# Patient Record
Sex: MALE | Race: WHITE | NOT HISPANIC OR LATINO | Employment: OTHER | URBAN - METROPOLITAN AREA
[De-identification: names, ages, dates, MRNs, and addresses within clinical notes are randomized per-mention and may not be internally consistent; named-entity substitution may affect disease eponyms.]

---

## 2017-01-05 ENCOUNTER — PATIENT MESSAGE (OUTPATIENT)
Dept: PAIN MEDICINE | Facility: CLINIC | Age: 57
End: 2017-01-05

## 2017-02-06 ENCOUNTER — PATIENT MESSAGE (OUTPATIENT)
Dept: PAIN MEDICINE | Facility: CLINIC | Age: 57
End: 2017-02-06

## 2017-02-07 ENCOUNTER — OFFICE VISIT (OUTPATIENT)
Dept: PAIN MEDICINE | Facility: CLINIC | Age: 57
End: 2017-02-07
Payer: OTHER GOVERNMENT

## 2017-02-07 VITALS
BODY MASS INDEX: 32.35 KG/M2 | HEART RATE: 76 BPM | WEIGHT: 252 LBS | RESPIRATION RATE: 20 BRPM | DIASTOLIC BLOOD PRESSURE: 74 MMHG | TEMPERATURE: 98 F | SYSTOLIC BLOOD PRESSURE: 128 MMHG

## 2017-02-07 DIAGNOSIS — G50.0 TRIGEMINAL NEURALGIA: ICD-10-CM

## 2017-02-07 DIAGNOSIS — G50.0 SUPRAORBITAL NEURALGIA: Primary | ICD-10-CM

## 2017-02-07 DIAGNOSIS — B02.29 POSTHERPETIC NEURALGIA: ICD-10-CM

## 2017-02-07 PROCEDURE — 99213 OFFICE O/P EST LOW 20 MIN: CPT | Mod: S$PBB,,, | Performed by: ANESTHESIOLOGY

## 2017-02-07 PROCEDURE — 99999 PR PBB SHADOW E&M-EST. PATIENT-LVL III: CPT | Mod: PBBFAC,,, | Performed by: ANESTHESIOLOGY

## 2017-02-07 PROCEDURE — 99213 OFFICE O/P EST LOW 20 MIN: CPT | Mod: PBBFAC,PO | Performed by: ANESTHESIOLOGY

## 2017-02-07 RX ORDER — SODIUM CHLORIDE, SODIUM LACTATE, POTASSIUM CHLORIDE, CALCIUM CHLORIDE 600; 310; 30; 20 MG/100ML; MG/100ML; MG/100ML; MG/100ML
INJECTION, SOLUTION INTRAVENOUS CONTINUOUS
Status: CANCELLED | OUTPATIENT
Start: 2017-02-24

## 2017-02-07 RX ORDER — MIDAZOLAM HYDROCHLORIDE 5 MG/ML
4 INJECTION INTRAMUSCULAR; INTRAVENOUS ONCE
Status: CANCELLED | OUTPATIENT
Start: 2017-02-24

## 2017-02-07 NOTE — PROGRESS NOTES
CC: Facial pain    Interval history: Mr. Muñoz is a 56-year-old male with left-sided facial pain that began after shingles in March 2015.  He returns in follow-up today with the same left facial pain.  During the last visit I had performed a left supraorbital block with bupivacaine and steroid.  He did report greater than 75% relief for 1 day which seems to be the duration of the bupivacaine but did not have any sustained effect afterwards.  He still would like to try anything to get rid of this, unfortunately no medications have actually been very helpful.  He has 2 times a day when it seems to affect him most, once in the morning as he wakes up and is taking a shower and then at the end of the day when he is trying to fall sleep.  He now has the pain localized in a strip of skin that is about 2 inches wide and starts at the left supraorbital ridge and extends cephalad to the top of his head.  It does not radiate out to the side, but has been radiating into the eye somewhat as well.      Pain intervention history: He has tried Lyrica and gabapentin both with side effects of drowsiness but no relief. He has failed Cymbalta, Gabapentin, and Lyrica, and Topamax with minimal benefit and SEs. He is s/p left trigeminal nerve block on 5/9/16 that provided no relief. Initially following block he actually had an exacerbation.    ROS: He reports fatigability, waking, itching, texture change of the skin, headaches, vision change, eye pain, rhinorrhea, cough, stomach pain, joint stiffness and difficulty sleeping.  Balance of review of systems is negative.    Medical, surgical, family and social history reviewed elsewhere in record.    Medications/Allergies: See med card    Vitals:    02/07/17 0933   BP: 128/74   Pulse: 76   Resp: 20   Temp: 98.3 °F (36.8 °C)   TempSrc: Oral   Weight: 114.3 kg (251 lb 15.8 oz)   PainSc:   4   PainLoc: Face         Physical exam:  Gen: A and O x3, pleasant, well-groomed  Skin: No rashes or  obvious lesions, specifically no lesions in the V1 distribution of his forehead or eye.  HEENT: PERRLA, no obvious deformities on ears or in canals.Trachea midline.  CVS: Regular rate and rhythm, normal palpable pulses.  Resp: Clear to auscultation bilaterally, no wheezes or rales.  Abdomen: Soft, NT/ND.  Musculoskeletal: A No antalgic gait.     Neuro:  Upper extremities: 5/5 strength bilaterally   Reflexes: Brachioradialis 2+, Bicep 2+, Tricep 2+.   Sensory: Intact and symmetrical to light touch and pinprick in C2-T1 dermatomes bilaterally.  Cranial nerves II through XII grossly intact.  He has some allodynia over the left V1 distribution.  He has sensitivity in the distribution from the supraorbital ridge on the left side upward, one small spot of extreme hypersensitivity about 2 inches cephalad to the supraorbital ridge.      Assessment:  Mr. Muñoz is a 56-year-old male with left-sided facial pain that began after shingles in March 2015.    1. Supraorbital neuralgia  Vital signs    Activity as tolerated    Place 18-22 [facility] peripheral IV     Verify informed consent    Notify physician     Notify physician     Notify physician (specify)    Diet NPO    Case Request Operating Room: RADIOFREQUENCY THERMOCOAGULATION supraorbital nerve    Place in Outpatient    lactated ringers infusion    midazolam (PF) 5 mg/mL injection 4 mg   2. Trigeminal neuralgia     3. Postherpetic neuralgia         Plan:  1.  We discussed that since he had excellent relief from the supraorbital nerve block on the left side during the duration of bupivacaine, I feel that radiofrequency ablation may provide some sustained relief.  We discussed risks and benefits and he would like to proceed.  2.  We discussed that if he is not getting benefit with the radiofrequency ablation, he may be evaluated by neurosurgery for possible neurectomy and we also discussed peripheral nerve stimulation trial.  3.  He asked about what sounds like a TENS unit  for the face, he had read about it somewhere.  I have told him to look at the information and send me a copy of this so that I can review it for him.  4.  I'll have him follow-up in 3-4 weeks after the radiofrequency ablation or sooner as needed.

## 2017-02-21 ENCOUNTER — PATIENT MESSAGE (OUTPATIENT)
Dept: PAIN MEDICINE | Facility: CLINIC | Age: 57
End: 2017-02-21

## 2017-02-24 ENCOUNTER — HOSPITAL ENCOUNTER (OUTPATIENT)
Dept: RADIOLOGY | Facility: HOSPITAL | Age: 57
Discharge: HOME | End: 2017-02-24
Attending: ANESTHESIOLOGY | Admitting: ANESTHESIOLOGY
Payer: OTHER GOVERNMENT

## 2017-02-24 ENCOUNTER — HOSPITAL ENCOUNTER (OUTPATIENT)
Facility: HOSPITAL | Age: 57
Discharge: HOME | End: 2017-02-24
Attending: ANESTHESIOLOGY | Admitting: ANESTHESIOLOGY
Payer: OTHER GOVERNMENT

## 2017-02-24 ENCOUNTER — SURGERY (OUTPATIENT)
Age: 57
End: 2017-02-24

## 2017-02-24 DIAGNOSIS — M51.36 DDD (DEGENERATIVE DISC DISEASE), LUMBAR: ICD-10-CM

## 2017-02-24 DIAGNOSIS — G50.0 SUPRAORBITAL NEURALGIA: ICD-10-CM

## 2017-02-24 PROCEDURE — 63600175 PHARM REV CODE 636 W HCPCS: Mod: PO | Performed by: ANESTHESIOLOGY

## 2017-02-24 PROCEDURE — 64640 INJECTION TREATMENT OF NERVE: CPT | Mod: PO | Performed by: ANESTHESIOLOGY

## 2017-02-24 PROCEDURE — 64640 INJECTION TREATMENT OF NERVE: CPT | Mod: LT,,, | Performed by: ANESTHESIOLOGY

## 2017-02-24 PROCEDURE — 64633 DESTROY CERV/THOR FACET JNT: CPT | Performed by: ANESTHESIOLOGY

## 2017-02-24 PROCEDURE — 25000003 PHARM REV CODE 250: Mod: PO | Performed by: ANESTHESIOLOGY

## 2017-02-24 PROCEDURE — 99152 MOD SED SAME PHYS/QHP 5/>YRS: CPT | Mod: ,,, | Performed by: ANESTHESIOLOGY

## 2017-02-24 RX ORDER — FENTANYL CITRATE 50 UG/ML
INJECTION, SOLUTION INTRAMUSCULAR; INTRAVENOUS
Status: DISCONTINUED | OUTPATIENT
Start: 2017-02-24 | End: 2017-02-24 | Stop reason: HOSPADM

## 2017-02-24 RX ORDER — SODIUM CHLORIDE, SODIUM LACTATE, POTASSIUM CHLORIDE, CALCIUM CHLORIDE 600; 310; 30; 20 MG/100ML; MG/100ML; MG/100ML; MG/100ML
INJECTION, SOLUTION INTRAVENOUS CONTINUOUS
Status: DISCONTINUED | OUTPATIENT
Start: 2017-02-24 | End: 2017-02-24 | Stop reason: HOSPADM

## 2017-02-24 RX ORDER — MIDAZOLAM HYDROCHLORIDE 1 MG/ML
4 INJECTION INTRAMUSCULAR; INTRAVENOUS ONCE
Status: COMPLETED | OUTPATIENT
Start: 2017-02-24 | End: 2017-02-24

## 2017-02-24 RX ORDER — LIDOCAINE HYDROCHLORIDE 20 MG/ML
INJECTION, SOLUTION EPIDURAL; INFILTRATION; INTRACAUDAL; PERINEURAL
Status: DISCONTINUED | OUTPATIENT
Start: 2017-02-24 | End: 2017-02-24 | Stop reason: HOSPADM

## 2017-02-24 RX ORDER — LIDOCAINE HYDROCHLORIDE 10 MG/ML
INJECTION INFILTRATION; PERINEURAL
Status: DISCONTINUED | OUTPATIENT
Start: 2017-02-24 | End: 2017-02-24 | Stop reason: HOSPADM

## 2017-02-24 RX ORDER — SODIUM CHLORIDE 9 MG/ML
INJECTION, SOLUTION INTRAMUSCULAR; INTRAVENOUS; SUBCUTANEOUS
Status: DISCONTINUED | OUTPATIENT
Start: 2017-02-24 | End: 2017-02-24 | Stop reason: HOSPADM

## 2017-02-24 RX ORDER — METHYLPREDNISOLONE ACETATE 40 MG/ML
INJECTION, SUSPENSION INTRA-ARTICULAR; INTRALESIONAL; INTRAMUSCULAR; SOFT TISSUE
Status: DISCONTINUED | OUTPATIENT
Start: 2017-02-24 | End: 2017-02-24 | Stop reason: HOSPADM

## 2017-02-24 RX ADMIN — SODIUM CHLORIDE, SODIUM LACTATE, POTASSIUM CHLORIDE, AND CALCIUM CHLORIDE: .6; .31; .03; .02 INJECTION, SOLUTION INTRAVENOUS at 12:02

## 2017-02-24 RX ADMIN — LIDOCAINE HYDROCHLORIDE 10 ML: 10 INJECTION, SOLUTION INFILTRATION; PERINEURAL at 01:02

## 2017-02-24 RX ADMIN — MIDAZOLAM HYDROCHLORIDE 4 MG: 1 INJECTION, SOLUTION INTRAMUSCULAR; INTRAVENOUS at 01:02

## 2017-02-24 RX ADMIN — METHYLPREDNISOLONE ACETATE 40 MG: 40 INJECTION, SUSPENSION INTRA-ARTICULAR; INTRALESIONAL; INTRAMUSCULAR; SOFT TISSUE at 01:02

## 2017-02-24 RX ADMIN — SODIUM CHLORIDE 4 ML: 9 INJECTION INTRAMUSCULAR; INTRAVENOUS; SUBCUTANEOUS at 01:02

## 2017-02-24 RX ADMIN — FENTANYL CITRATE 50 MCG: 50 INJECTION, SOLUTION INTRAMUSCULAR; INTRAVENOUS at 01:02

## 2017-02-24 RX ADMIN — LIDOCAINE HYDROCHLORIDE 4 ML: 20 INJECTION, SOLUTION EPIDURAL; INFILTRATION; INTRACAUDAL; PERINEURAL at 01:02

## 2017-02-24 NOTE — INTERVAL H&P NOTE
The patient has been examined and the H&P has been reviewed:    I concur with the findings and no changes have occurred since H&P was written.    Anesthesia/Surgery risks, benefits and alternative options discussed and understood by patient/family.          Active Hospital Problems    Diagnosis  POA    Supraorbital neuralgia [G52.9]  Yes      Resolved Hospital Problems    Diagnosis Date Resolved POA   No resolved problems to display.

## 2017-02-24 NOTE — IP AVS SNAPSHOT
[facility]  [address]  [place] [address]  Phone: [phone]           Patient Discharge Instructions     Our goal is to set you up for success. This packet includes information on your condition, medications, and your home care. It will help you to care for yourself so you don't get sicker and need to go back to the hospital.     Please ask your nurse if you have any questions.        There are many details to remember when preparing to leave the hospital. Here is what you will need to do:    1. Take your medicine. If you are prescribed medications, review your Medication List in the following pages. You may have new medications to  at the pharmacy and others that you'll need to stop taking. Review the instructions for how and when to take your medications. Talk with your doctor or nurses if you are unsure of what to do.     2. Go to your follow-up appointments. Specific follow-up information is listed in the following pages. Your may be contacted by a transition nurse or clinical provider about future appointments. Be sure we have all of the phone numbers to reach you, if needed. Please contact your provider's office if you are unable to make an appointment.     3. Watch for warning signs. Your doctor or nurse will give you detailed warning signs to watch for and when to call for assistance. These instructions may also include educational information about your condition. If you experience any of warning signs to your health, call your doctor.               [facility]  Unless otherwise directed by your provider, please contact [facility], our nurse care line that is available for 24/7 assistance.     [phone] (toll-free)    Registered nurses in the [facility] provide clinical advisement, health education, appointment booking, and other advisory services.                    ** Verify the list of medication(s) below is accurate and up  to date. Carry this with you in case of emergency. If your medications have changed, please notify your healthcare provider.             Medication List      CONTINUE taking these medications        Additional Info                      hydrOXYzine HCl 25 MG tablet   Commonly known as:  ATARAX   Quantity:  30 tablet   Refills:  1   Dose:  25 mg    Instructions:  Take 1 tablet (25 mg total) by mouth 2 (two) times daily as needed for Itching.     Begin Date    AM    Noon    PM    Bedtime       loratadine 10 mg tablet   Commonly known as:  CLARITIN   Quantity:  30 tablet   Refills:  3   Dose:  10 mg    Instructions:  Take 1 tablet (10 mg total) by mouth once daily.     Begin Date    AM    Noon    PM    Bedtime       pantoprazole 40 MG tablet   Commonly known as:  PROTONIX   Quantity:  30 tablet   Refills:  3   Dose:  40 mg    Instructions:  Take 1 tablet (40 mg total) by mouth once daily.     Begin Date    AM    Noon    PM    Bedtime       prednisoLONE acetate 1 % Drps   Commonly known as:  PRED FORTE   Refills:  0    Instructions:  INSTILL ONE DROP INTO LEFT EYE 3 TIMES DAILY     Begin Date    AM    Noon    PM    Bedtime       simvastatin 20 MG tablet   Commonly known as:  ZOCOR   Quantity:  30 tablet   Refills:  5   Dose:  20 mg    Instructions:  Take 1 tablet (20 mg total) by mouth every evening.     Begin Date    AM    Noon    PM    Bedtime                  Please bring to all follow up appointments:    1. A copy of your discharge instructions.  2. All medicines you are currently taking in their original bottles.  3. Identification and insurance card.    Please arrive 15 minutes ahead of scheduled appointment time.    Please call 24 hours in advance if you must reschedule your appointment and/or time.        Your Scheduled Appointments     Mar 14, 2017  9:30 AM CDT   Established Patient Visit with MD [facility]    [address] [facility] [place] [address]    [phone]                Discharge Instructions     Future Orders    Activity as tolerated     Call MD for:  redness, tenderness, or signs of infection (pain, swelling, redness, odor or green/yellow discharge around incision site)     Call MD for:  severe persistent headache     Call MD for:  severe uncontrolled pain     Call MD for:  temperature >100.4     Diet general     Questions:    Total calories:      Fat restriction, if any:      Protein restriction, if any:      Na restriction, if any:      Fluid restriction:      Additional restrictions:      No dressing needed         Discharge Instructions       Home care instructions  Apply ice pack to the injection site for 20 minutes periods for the first 24 hrs for soreness/discomfort at injection site DO NOT USE HEAT FOR 24 HOURS  Keep site clean and dry for 24 hours, remove bandaid when desired  Do not drive until tomorrow  Take care when walking after a lumbar injection  Avoid strenuous activities for 2 days  Make take 2 weeks to feel the full effects   Resume home medication as prescribed today  Resume Aspirin, Plavix, or Coumadin the day after the procedure unless otherwise instructed.    SEE IMMEDIATE MEDICAL HELP FOR:  Severe increase in your usual pain or appearance of new pain  Prolonged or increasing weakness or numbness in the legs or arms  Drainage, redness, active bleeding, or increased swelling at the injection site  Temperature over 100.0 degrees F.  Headache that increases when your head is upright and decreases when you lie flat    CALL 911 OR GO DIRECTLY TO EMERGENCY DEPARTMENT FOR:  Shortness of breath, chest pain, or problems breathing      Primary Diagnosis     Your primary diagnosis was:  Cranial Nerve Disorder      Admission Information     Date & Time Provider Department [facility]    2/24/2017 11:36 AM Chun Crain MD [facility] [phone]      Care Providers     Provider Role Specialty Primary office phone    Chun MULLINS  MD [facility] Attending Provider Pain Medicine [phone]    Chun Crain MD Surgeon  Pain Medicine [phone]      Your Vitals Were     BP                   112/68 (BP Location: Left arm, Patient Position: Sitting, BP Method: Automatic)           Recent Lab Values     No lab values to display.      Allergies as of 2/24/2017        Reactions    No Known Allergies       Advance Directives     An advance directive is a document which, in the event you are no longer able to make decisions for yourself, tells your healthcare team what kind of treatment you do or do not want to receive, or who you would like to make those decisions for you.  If you do not currently have an advance directive, [facility] encourages you to create one.  For more information call:  [phone]), [phone] ([phone]),  or log on to [email or web address].        Language Assistance Services     ATTENTION: Language assistance services are available, free of charge. Please call [phone].      ATENCIÓN: Si habla español, tiene a plaza disposición servicios gratuitos de asistencia lingüística. Llame al [phone].     CHÚ Ý: N?u b?n nói Ti?ng Vi?t, có các d?ch v? h? tr? ngôn ng? mi?n phí dành cho b?n. G?i s? [phone].         [facility] complies with applicable Federal civil rights laws and does not discriminate on the basis of race, color, national origin, age, disability, or sex.

## 2017-02-24 NOTE — OP NOTE
PROCEDURE DATE: 2/24/2017    PROCEDURE:  Radiofrequency ablation of the left supraorbital nerve medial branch nerves on the left-side utilizing fluoroscopy    DIAGNOSIS:  Supraorbital neuralgia    Post op Diagnosis: Same    PHYSICIAN: Chun Crain MD    MEDICATIONS INJECTED:  From a mixture of 4ml of 2% lidocaine and 40mg of methylprednisone, 0.5ml of this solution was injected at the site.    LOCAL ANESTHETIC USED: Lidocaine 1%, 1 ml given at each site.    SEDATION MEDICATIONS: 4mg versed, 50mcg fentanyl    ESTIMATED BLOOD LOSS:  None    COMPLICATIONS:  none    TECHNIQUE:  A time out was taken to identify patient and procedure side prior to starting the procedure. Laying in a supine position, the patient was prepped and draped in the usual sterile fashion using ChloraPrep and sterile towels.  The left supraorbital foramen was visualized under fluoroscopic guidance and then marked.  Local anesthetic was given by raising a wheal at the skin over the site.  A 20-gauge  50 mm CoursePeer RF needle was introduced to the anatomic location of the left supraorbital nerve over the left supraorbital foramen.  Motor stimulation up to 2 Volts at each level confirmed no motor nerve involvement.  Impedance was less than 800 ohms at each level. The above noted medication was then injected slowly.  Ablation was performed per level utilizing [place] radiofrequency generator 42°C for 240 seconds. The patient tolerated the procedure well.     The patient was monitored after the procedure.  Patient was given post procedure and discharge instructions to follow at home.  The patient was discharged in a stable condition

## 2017-02-24 NOTE — DISCHARGE SUMMARY
[facility]  Discharge Note  Short Stay    Admit Date: 2/24/2017    Discharge Date: 2/24/2017    Attending Physician: Chun Crain MD     Discharge Provider: Chun Crain    Diagnoses:  Active Hospital Problems    Diagnosis  POA    *Supraorbital neuralgia [G52.9]  Yes      Resolved Hospital Problems    Diagnosis Date Resolved POA   No resolved problems to display.       Discharged Condition: good    Final Diagnoses: Supraorbital neuralgia [G52.9]    Disposition: Home or Self Care    Hospital Course: no complications, uneventful    Outcome of Hospitalization, Treatment, Procedure, or Surgery:  Patient was admitted for outpatient procedure. The patient underwent procedure without complications and are discharged home    Follow up/Patient Instructions:  Follow up as scheduled/Patient has received instructions and follow up date    Medications:  Continue previous medications      Discharge Procedure Orders  Diet general     Activity as tolerated     Call MD for:  temperature >100.4     Call MD for:  severe uncontrolled pain     Call MD for:  redness, tenderness, or signs of infection (pain, swelling, redness, odor or green/yellow discharge around incision site)     Call MD for:  severe persistent headache     No dressing needed           Discharge Procedure Orders (must include Diet, Follow-up, Activity):    Discharge Procedure Orders (must include Diet, Follow-up, Activity)  Diet general     Activity as tolerated     Call MD for:  temperature >100.4     Call MD for:  severe uncontrolled pain     Call MD for:  redness, tenderness, or signs of infection (pain, swelling, redness, odor or green/yellow discharge around incision site)     Call MD for:  severe persistent headache     No dressing needed

## 2017-02-24 NOTE — H&P (VIEW-ONLY)
CC: Facial pain    Interval history: Mr. Muñoz is a 56-year-old male with left-sided facial pain that began after shingles in March 2015.  He returns in follow-up today with the same left facial pain.  During the last visit I had performed a left supraorbital block with bupivacaine and steroid.  He did report greater than 75% relief for 1 day which seems to be the duration of the bupivacaine but did not have any sustained effect afterwards.  He still would like to try anything to get rid of this, unfortunately no medications have actually been very helpful.  He has 2 times a day when it seems to affect him most, once in the morning as he wakes up and is taking a shower and then at the end of the day when he is trying to fall sleep.  He now has the pain localized in a strip of skin that is about 2 inches wide and starts at the left supraorbital ridge and extends cephalad to the top of his head.  It does not radiate out to the side, but has been radiating into the eye somewhat as well.      Pain intervention history: He has tried Lyrica and gabapentin both with side effects of drowsiness but no relief. He has failed Cymbalta, Gabapentin, and Lyrica, and Topamax with minimal benefit and SEs. He is s/p left trigeminal nerve block on 5/9/16 that provided no relief. Initially following block he actually had an exacerbation.    ROS: He reports fatigability, waking, itching, texture change of the skin, headaches, vision change, eye pain, rhinorrhea, cough, stomach pain, joint stiffness and difficulty sleeping.  Balance of review of systems is negative.    Medical, surgical, family and social history reviewed elsewhere in record.    Medications/Allergies: See med card    Vitals:    02/07/17 0933   BP: 128/74   Pulse: 76   Resp: 20   Temp: 98.3 °F (36.8 °C)   TempSrc: Oral   Weight: 114.3 kg (251 lb 15.8 oz)   PainSc:   4   PainLoc: Face         Physical exam:  Gen: A and O x3, pleasant, well-groomed  Skin: No rashes or  obvious lesions, specifically no lesions in the V1 distribution of his forehead or eye.  HEENT: PERRLA, no obvious deformities on ears or in canals.Trachea midline.  CVS: Regular rate and rhythm, normal palpable pulses.  Resp: Clear to auscultation bilaterally, no wheezes or rales.  Abdomen: Soft, NT/ND.  Musculoskeletal: A No antalgic gait.     Neuro:  Upper extremities: 5/5 strength bilaterally   Reflexes: Brachioradialis 2+, Bicep 2+, Tricep 2+.   Sensory: Intact and symmetrical to light touch and pinprick in C2-T1 dermatomes bilaterally.  Cranial nerves II through XII grossly intact.  He has some allodynia over the left V1 distribution.  He has sensitivity in the distribution from the supraorbital ridge on the left side upward, one small spot of extreme hypersensitivity about 2 inches cephalad to the supraorbital ridge.      Assessment:  Mr. Muñoz is a 56-year-old male with left-sided facial pain that began after shingles in March 2015.    1. Supraorbital neuralgia  Vital signs    Activity as tolerated    Place 18-22 [facility] peripheral IV     Verify informed consent    Notify physician     Notify physician     Notify physician (specify)    Diet NPO    Case Request Operating Room: RADIOFREQUENCY THERMOCOAGULATION supraorbital nerve    Place in Outpatient    lactated ringers infusion    midazolam (PF) 5 mg/mL injection 4 mg   2. Trigeminal neuralgia     3. Postherpetic neuralgia         Plan:  1.  We discussed that since he had excellent relief from the supraorbital nerve block on the left side during the duration of bupivacaine, I feel that radiofrequency ablation may provide some sustained relief.  We discussed risks and benefits and he would like to proceed.  2.  We discussed that if he is not getting benefit with the radiofrequency ablation, he may be evaluated by neurosurgery for possible neurectomy and we also discussed peripheral nerve stimulation trial.  3.  He asked about what sounds like a TENS unit  for the face, he had read about it somewhere.  I have told him to look at the information and send me a copy of this so that I can review it for him.  4.  I'll have him follow-up in 3-4 weeks after the radiofrequency ablation or sooner as needed.

## 2017-03-01 VITALS
BODY MASS INDEX: 32.21 KG/M2 | HEART RATE: 70 BPM | WEIGHT: 251 LBS | DIASTOLIC BLOOD PRESSURE: 66 MMHG | TEMPERATURE: 98 F | HEIGHT: 74 IN | RESPIRATION RATE: 18 BRPM | SYSTOLIC BLOOD PRESSURE: 117 MMHG | OXYGEN SATURATION: 98 %

## 2017-04-19 ENCOUNTER — OFFICE VISIT (OUTPATIENT)
Dept: FAMILY MEDICINE | Facility: CLINIC | Age: 57
End: 2017-04-19
Payer: OTHER GOVERNMENT

## 2017-04-19 VITALS
HEART RATE: 88 BPM | TEMPERATURE: 98 F | SYSTOLIC BLOOD PRESSURE: 110 MMHG | BODY MASS INDEX: 31.46 KG/M2 | WEIGHT: 245.13 LBS | HEIGHT: 74 IN | DIASTOLIC BLOOD PRESSURE: 70 MMHG

## 2017-04-19 DIAGNOSIS — M54.50 ACUTE BILATERAL LOW BACK PAIN WITHOUT SCIATICA: Primary | ICD-10-CM

## 2017-04-19 DIAGNOSIS — T14.8XXA MUSCLE STRAIN: ICD-10-CM

## 2017-04-19 PROCEDURE — 99999 PR PBB SHADOW E&M-EST. PATIENT-LVL III: CPT | Mod: PBBFAC,,, | Performed by: NURSE PRACTITIONER

## 2017-04-19 PROCEDURE — 99213 OFFICE O/P EST LOW 20 MIN: CPT | Mod: S$PBB,,, | Performed by: NURSE PRACTITIONER

## 2017-04-19 PROCEDURE — 99213 OFFICE O/P EST LOW 20 MIN: CPT | Mod: PBBFAC,PO | Performed by: NURSE PRACTITIONER

## 2017-04-19 RX ORDER — IBUPROFEN 200 MG
200 TABLET ORAL EVERY 6 HOURS PRN
COMMUNITY
End: 2018-06-15

## 2017-04-19 RX ORDER — CYCLOBENZAPRINE HCL 10 MG
10 TABLET ORAL 2 TIMES DAILY PRN
Qty: 60 TABLET | Refills: 0 | Status: SHIPPED | OUTPATIENT
Start: 2017-04-19 | End: 2017-04-29

## 2017-04-19 RX ORDER — NAPROXEN 500 MG/1
500 TABLET ORAL 2 TIMES DAILY WITH MEALS
Qty: 60 TABLET | Refills: 0 | Status: SHIPPED | OUTPATIENT
Start: 2017-04-19 | End: 2017-08-25

## 2017-04-19 NOTE — MR AVS SNAPSHOT
[facility]  [address]  [facility] [address]  Phone: [phone]  Fax: [phone]                  Samir Muñoz   2017 1:40 PM   Office Visit    Description:  Male : 1960   Provider:  Katya Talavera NP   Department:  [facility]           Reason for Visit     Back Pain           Diagnoses this Visit        Comments    Acute bilateral low back pain without sciatica    -  Primary     Muscle strain                To Do List           Goals (5 Years of Data)     None       These Medications        Disp Refills Start End    cyclobenzaprine (FLEXERIL) 10 MG tablet 60 tablet 0 2017    Take 1 tablet (10 mg total) by mouth 2 (two) times daily as needed for Muscle spasms (may cause drowsiness). - Oral    Pharmacy: [facility] [address] [place], [place] [address] [place]  Ph #: [phone]       naproxen (NAPROSYN) 500 MG tablet 60 tablet 0 2017     Take 1 tablet (500 mg total) by mouth 2 (two) times daily with meals. As needed for low back pain - Oral    Pharmacy: [facility] [address] [place], [place] [address] [place]  Ph #: [phone]         [facility]     [facility] Care [facility] -  Assistance  Unless otherwise directed by your provider, please contact [facility], our nurse care line that is available for  assistance.     Registered nurses in the [facility] provide: appointment scheduling, clinical advisement, health education, and other advisory services.  Call: [phone] (toll free)               Medications           Message regarding Medications     Verify the changes and/or additions to your medication regime listed below are the same as discussed with your clinician today.  If any of these changes or additions are incorrect, please notify your healthcare provider.        START taking these NEW medications        Refills    cyclobenzaprine (FLEXERIL) 10 MG tablet 0    Sig: Take 1  "tablet (10 mg total) by mouth 2 (two) times daily as needed for Muscle spasms (may cause drowsiness).    Class: Normal    Route: Oral    naproxen (NAPROSYN) 500 MG tablet 0    Sig: Take 1 tablet (500 mg total) by mouth 2 (two) times daily with meals. As needed for low back pain    Class: Normal    Route: Oral           Verify that the below list of medications is an accurate representation of the medications you are currently taking.  If none reported, the list may be blank. If incorrect, please contact your healthcare provider. Carry this list with you in case of emergency.           Current Medications     hydrOXYzine HCl (ATARAX) 25 MG tablet Take 1 tablet (25 mg total) by mouth 2 (two) times daily as needed for Itching.    ibuprofen (ADVIL,MOTRIN) 200 MG tablet Take 200 mg by mouth every 6 (six) hours as needed for Pain.    loratadine (CLARITIN) 10 mg tablet Take 1 tablet (10 mg total) by mouth once daily.    pantoprazole (PROTONIX) 40 MG tablet Take 1 tablet (40 mg total) by mouth once daily.    prednisoLONE acetate (PRED FORTE) 1 % DrpS INSTILL ONE DROP INTO LEFT EYE 3 TIMES DAILY    simvastatin (ZOCOR) 20 MG tablet Take 1 tablet (20 mg total) by mouth every evening.    cyclobenzaprine (FLEXERIL) 10 MG tablet Take 1 tablet (10 mg total) by mouth 2 (two) times daily as needed for Muscle spasms (may cause drowsiness).    naproxen (NAPROSYN) 500 MG tablet Take 1 tablet (500 mg total) by mouth 2 (two) times daily with meals. As needed for low back pain           Clinical Reference Information           Your Vitals Were     BP Pulse Temp Height Weight BMI    110/70 (BP Location: Left arm, Patient Position: Sitting, BP Method: Manual) 88 98 °F (36.7 °C) (Oral) 6' 2" (1.88 m) 111.2 kg (245 lb 2.4 oz) 31.48 kg/m2      Blood Pressure          Most Recent Value    BP  110/70      Allergies as of 4/19/2017     No Known Allergies      Immunizations Administered on Date of Encounter - 4/19/2017     None      Language " Assistance Services     ATTENTION: Language assistance services are available, free of charge. Please call [phone].      ATENCIÓN: Si habla dorota, tiene a plaza disposición servicios gratuitos de asistencia lingüística. Llame al [phone].     CHÚ Ý: N?u b?n nói Ti?ng Vi?t, có các d?ch v? h? tr? ngôn ng? mi?n phí dành cho b?n. G?i s? [phone].         [facility] complies with applicable Federal civil rights laws and does not discriminate on the basis of race, color, national origin, age, disability, or sex.

## 2017-04-19 NOTE — PROGRESS NOTES
Subjective:       Patient ID: Samir Muñoz is a 56 y.o. male.    Chief Complaint: Back Pain (Low back pain, strained on Sunday moving a desk)  He was last seen in primary care on 12/20/2016 by me.   Back Pain   This is a new problem. The current episode started in the past 7 days. The problem occurs constantly. The problem has been gradually worsening since onset. The pain is present in the lumbar spine. The quality of the pain is described as aching and shooting. The pain does not radiate. The pain is at a severity of 7/10. The pain is moderate. The pain is the same all the time. The symptoms are aggravated by bending, coughing, position, sitting, standing and twisting. Stiffness is present all day. Pertinent negatives include no abdominal pain, bladder incontinence, bowel incontinence, chest pain, dysuria, fever, headaches, leg pain, numbness, paresis, paresthesias, pelvic pain, perianal numbness, tingling, weakness or weight loss. Risk factors include recent trauma. He has tried heat for the symptoms. The treatment provided no relief.    States he picked up a desk on Sunday and has been experiencing back pain since.  Vitals:    04/19/17 1324   BP: 110/70   Pulse: 88   Temp: 98 °F (36.7 °C)     Review of Systems   Constitutional: Negative for fever and weight loss.   Cardiovascular: Negative for chest pain.   Gastrointestinal: Negative for abdominal pain and bowel incontinence.   Genitourinary: Negative for bladder incontinence, dysuria and pelvic pain.   Musculoskeletal: Positive for back pain.   Neurological: Negative for tingling, weakness, numbness, headaches and paresthesias.       States he has had worse low back pain prior and this is not as bad but continues to feel the pain, no fever, no radiation, no loss of control of bowel or bladder  Objective:      Physical Exam   Constitutional: He is oriented to person, place, and time. Vital signs are normal. He appears well-developed and well-nourished.    HENT:   Head: Normocephalic and atraumatic.   Right Ear: Hearing and external ear normal.   Left Ear: Hearing and external ear normal.   Nose: Nose normal.   Neck: Normal range of motion. Neck supple. No rigidity.   Cardiovascular: Normal rate and regular rhythm.    Pulmonary/Chest: Effort normal and breath sounds normal.   Musculoskeletal:        Lumbar back: He exhibits decreased range of motion and pain.        Back:    Neurological: He is alert and oriented to person, place, and time. He has normal strength. Coordination and gait normal.   Skin: Skin is warm, dry and intact.   Psychiatric: He has a normal mood and affect. His speech is normal and behavior is normal. Judgment and thought content normal. Cognition and memory are normal.   Nursing note and vitals reviewed.      Assessment & Plan:       Acute bilateral low back pain without sciatica  -     cyclobenzaprine (FLEXERIL) 10 MG tablet; Take 1 tablet (10 mg total) by mouth 2 (two) times daily as needed for Muscle spasms (may cause drowsiness).  Dispense: 60 tablet; Refill: 0  -     naproxen (NAPROSYN) 500 MG tablet; Take 1 tablet (500 mg total) by mouth 2 (two) times daily with meals. As needed for low back pain  Dispense: 60 tablet; Refill: 0    Muscle strain  -     cyclobenzaprine (FLEXERIL) 10 MG tablet; Take 1 tablet (10 mg total) by mouth 2 (two) times daily as needed for Muscle spasms (may cause drowsiness).  Dispense: 60 tablet; Refill: 0  -     naproxen (NAPROSYN) 500 MG tablet; Take 1 tablet (500 mg total) by mouth 2 (two) times daily with meals. As needed for low back pain  Dispense: 60 tablet; Refill: 0    Note given to be off work until next Wednesday  States is scheduled off on Monday and Tuesday of next week for moving.   He has been encouraged to rest back over next 3 days.      No Follow-up on file.  Answers for HPI/ROS submitted by the patient on 4/19/2017   genital pain: No

## 2017-04-19 NOTE — LETTER
April 19, 2017      [facility]  [address]  [place] [address]  Phone: [phone]  Fax: [phone]       Patient: Samir Muñoz   YOB: 1960  Date of Visit: 04/19/2017    To Whom It May Concern:    Samir Camara was at [facility] on 04/19/2017. He may return to work/school on 04/26/2017 with no restrictions. If you have any questions or concerns, or if I can be of further assistance, please do not hesitate to contact me.    Sincerely,    Katya Talavera NP

## 2017-06-19 ENCOUNTER — PATIENT MESSAGE (OUTPATIENT)
Dept: FAMILY MEDICINE | Facility: CLINIC | Age: 57
End: 2017-06-19

## 2017-06-19 DIAGNOSIS — H26.9 CATARACT, UNSPECIFIED CATARACT TYPE, UNSPECIFIED LATERALITY: Primary | ICD-10-CM

## 2017-06-19 NOTE — TELEPHONE ENCOUNTER
"From inbox message   "Dr Black,   I was treated by Dr Zuñiga for nerve damage in the left eye due to shingles with steriod eye drops.   I now have a catatract as a result.  he wants dr. horton to perform eye surgery with a laser, first can I get a referral for the surgery with sidney.  Second, I would like to see another eye doctor with [facility] for a second opinion .  Dr Horton is really pushing the laser instead of manual and wanting to charge 3200 out of pocket as [facility] will not cover the laser.   I want an opinion on the cataract and surgery required, manual or laser. "    Consult is pended for your approval as requested.   "

## 2017-06-20 ENCOUNTER — PATIENT MESSAGE (OUTPATIENT)
Dept: FAMILY MEDICINE | Facility: CLINIC | Age: 57
End: 2017-06-20

## 2017-07-21 DIAGNOSIS — E78.5 HYPERLIPIDEMIA: ICD-10-CM

## 2017-07-21 RX ORDER — SIMVASTATIN 20 MG/1
TABLET, FILM COATED ORAL
Qty: 30 TABLET | Refills: 0 | Status: SHIPPED | OUTPATIENT
Start: 2017-07-21 | End: 2017-09-22 | Stop reason: SDUPTHER

## 2017-07-26 ENCOUNTER — OFFICE VISIT (OUTPATIENT)
Dept: PAIN MEDICINE | Facility: CLINIC | Age: 57
End: 2017-07-26
Payer: OTHER GOVERNMENT

## 2017-07-26 VITALS
RESPIRATION RATE: 18 BRPM | BODY MASS INDEX: 32.19 KG/M2 | WEIGHT: 250.69 LBS | TEMPERATURE: 98 F | SYSTOLIC BLOOD PRESSURE: 120 MMHG | HEART RATE: 76 BPM | DIASTOLIC BLOOD PRESSURE: 68 MMHG

## 2017-07-26 DIAGNOSIS — B02.29 POSTHERPETIC NEURALGIA: ICD-10-CM

## 2017-07-26 DIAGNOSIS — G50.0 SUPRAORBITAL NEURALGIA: Primary | ICD-10-CM

## 2017-07-26 PROCEDURE — 99213 OFFICE O/P EST LOW 20 MIN: CPT | Mod: PBBFAC,PO | Performed by: ANESTHESIOLOGY

## 2017-07-26 PROCEDURE — 99999 PR PBB SHADOW E&M-EST. PATIENT-LVL III: CPT | Mod: PBBFAC,,, | Performed by: ANESTHESIOLOGY

## 2017-07-26 PROCEDURE — 99213 OFFICE O/P EST LOW 20 MIN: CPT | Mod: S$PBB,,, | Performed by: ANESTHESIOLOGY

## 2017-07-26 NOTE — PROGRESS NOTES
CC: Facial pain    Interval history: Mr. Muñoz is a 56-year-old male with left-sided facial pain that began after shingles in March 2015.  He is status post left supraorbital nerve radiofrequency ablation on 2/24/17 with no major relief.  He continues to have pain and in the distribution of the left supraorbital nerve.  He has itching, tingling, pain in this distribution.  It causes him difficulty with sleeping, difficulty with concentrating at work.  He denies any pain into the eye, no new infections.      Pain intervention history: He has tried Lyrica and gabapentin both with side effects of drowsiness but no relief. He has failed Cymbalta, Gabapentin, and Lyrica, and Topamax with minimal benefit and SEs. He is s/p left trigeminal nerve block on 5/9/16 that provided no relief. Initially following block he actually had an exacerbation. Supraorbital nerve block with greater than 75% relief for 1 day which seems to be the duration of the bupivacaine.  He is status post left supraorbital nerve radiofrequency ablation on 2/24/17 with no major relief.    ROS: He reports fatigability, waking, itching, texture change of the skin, headaches, vision change, eye pain, rhinorrhea, cough, stomach pain, joint stiffness and difficulty sleeping.  Balance of review of systems is negative.    Medical, surgical, family and social history reviewed elsewhere in record.    Medications/Allergies: See med card    Vitals:    07/26/17 1033   BP: 120/68   Pulse: 76   Resp: 18   Temp: 97.8 °F (36.6 °C)   TempSrc: Oral   Weight: 113.7 kg (250 lb 11.2 oz)   PainSc:   8   PainLoc: Face         Physical exam:  Gen: A and O x3, pleasant, well-groomed  Skin: No rashes or obvious lesions, specifically no lesions in the V1 distribution of his forehead or eye.  HEENT: PERRLA, no obvious deformities on ears or in canals.Trachea midline.  CVS: Regular rate and rhythm, normal palpable pulses.  Resp: Clear to auscultation bilaterally, no wheezes or  rales.  Abdomen: Soft, NT/ND.  Musculoskeletal: A No antalgic gait.     Neuro:  Upper extremities: 5/5 strength bilaterally   Reflexes: Brachioradialis 2+, Bicep 2+, Tricep 2+.   Sensory: Intact and symmetrical to light touch and pinprick in C2-T1 dermatomes bilaterally.  Cranial nerves II through XII grossly intact.  He has some allodynia over the left V1 distribution.  He has sensitivity in the distribution from the supraorbital ridge on the left side upward, one small spot of extreme hypersensitivity about 2 inches cephalad to the supraorbital ridge.      Assessment:  Mr. Muñoz is a 56-year-old male with left-sided facial pain that began after shingles in March 2015.    1. Supraorbital neuralgia     2. Postherpetic neuralgia         Plan:  1.  Unfortunately he did not receive any long-term relief with the supraorbital nerve radiofrequency ablation.  He has had a positive response to supraorbital nerve block but only for the duration of the local anesthetic.  I will discuss his case with neurosurgical and neurology colleagues to see if there are any other options, possible neurectomy.  Otherwise, I think a peripheral stimulator lead could be placed over the supraorbital nerves to see if this provides relief, implantation if successful.  2.  In the meantime I will get him a topical medication to hopefully help, he has tried Aspercreme with some benefit.

## 2017-07-31 ENCOUNTER — TELEPHONE (OUTPATIENT)
Dept: FAMILY MEDICINE | Facility: CLINIC | Age: 57
End: 2017-07-31

## 2017-07-31 ENCOUNTER — PATIENT MESSAGE (OUTPATIENT)
Dept: OPHTHALMOLOGY | Facility: CLINIC | Age: 57
End: 2017-07-31

## 2017-07-31 ENCOUNTER — TELEPHONE (OUTPATIENT)
Dept: OPHTHALMOLOGY | Facility: CLINIC | Age: 57
End: 2017-07-31

## 2017-07-31 ENCOUNTER — PATIENT MESSAGE (OUTPATIENT)
Dept: FAMILY MEDICINE | Facility: CLINIC | Age: 57
End: 2017-07-31

## 2017-07-31 DIAGNOSIS — H26.9 CATARACT, UNSPECIFIED CATARACT TYPE, UNSPECIFIED LATERALITY: Primary | ICD-10-CM

## 2017-07-31 DIAGNOSIS — H25.042 POSTERIOR SUBCAPSULAR POLAR AGE-RELATED CATARACT OF LEFT EYE: Primary | ICD-10-CM

## 2017-07-31 NOTE — TELEPHONE ENCOUNTER
----- Message from Deborah Anna sent at 7/28/2017 12:01 PM CDT -----  Contact: call ..[phone] ext  [phone]  DR horton    Calling to  Speak to  The  Nurse about  Ref  For a  Surgery on   Monday //

## 2017-07-31 NOTE — TELEPHONE ENCOUNTER
Spoke to office nurse Carlie.  Patient is scheduled for surgery today.   Referral entered.   Fax to [phone]

## 2017-08-01 ENCOUNTER — TELEPHONE (OUTPATIENT)
Dept: FAMILY MEDICINE | Facility: CLINIC | Age: 57
End: 2017-08-01

## 2017-08-01 NOTE — TELEPHONE ENCOUNTER
----- Message from Ml Doll sent at 7/31/2017  2:37 PM CDT -----  Contact: Cathleen/Dr. Bibiana Connolly called and stated the patient is having surgery today and she wants to know if authorization for it was received. She can be contacted at [phone] ext [phone].    Thanks,  Ml

## 2017-08-25 ENCOUNTER — INITIAL CONSULT (OUTPATIENT)
Dept: OPHTHALMOLOGY | Facility: CLINIC | Age: 57
End: 2017-08-25
Payer: OTHER GOVERNMENT

## 2017-08-25 DIAGNOSIS — H17.9 LEFT CORNEA SCAR: ICD-10-CM

## 2017-08-25 DIAGNOSIS — H25.042 POSTERIOR SUBCAPSULAR AGE-RELATED CATARACT OF LEFT EYE: ICD-10-CM

## 2017-08-25 DIAGNOSIS — Z01.00 ENCOUNTER FOR EYE EXAM: Primary | ICD-10-CM

## 2017-08-25 DIAGNOSIS — H52.7 REFRACTIVE ERROR: ICD-10-CM

## 2017-08-25 DIAGNOSIS — Z86.19 HISTORY OF HERPES ZOSTER: ICD-10-CM

## 2017-08-25 DIAGNOSIS — H25.13 NUCLEAR SCLEROSIS, BILATERAL: ICD-10-CM

## 2017-08-25 PROCEDURE — 99212 OFFICE O/P EST SF 10 MIN: CPT | Mod: PBBFAC,PO | Performed by: OPHTHALMOLOGY

## 2017-08-25 PROCEDURE — 99999 PR PBB SHADOW E&M-EST. PATIENT-LVL II: CPT | Mod: PBBFAC,,, | Performed by: OPHTHALMOLOGY

## 2017-08-25 PROCEDURE — 92004 COMPRE OPH EXAM NEW PT 1/>: CPT | Mod: S$PBB,,, | Performed by: OPHTHALMOLOGY

## 2017-08-25 NOTE — PROGRESS NOTES
HPI     2nd Opinion on Cataract Surgery,   C/o decreased vision with nighttime driving gradually worsening over the   past year told of Cataract left eye by Dr Vivas,    Pt dx: with shingles to left eye and left side of forehead 2 years ago,   was rx Pred Forte used for 1 year at [facility] , was told by optometrist Dr Vivas he needed Cataract sx because of using Pred for so long was set   up for surgery with Dr Fraser while waiting at suite to have procedure   told insurance had not went through pt was dissatisfied, so he left ,   states he wanted a second opinion anyway. States he has a little eye pain   and pain to the left side of forehead since having the shingles also the   area itches quite often due to neuralgia. Denies any previous eye injury   or surgery. No known family h/o eye disease.     Agree with above.     Last edited by Preeti Reza MD on 8/25/2017 10:46 AM.   (History)        ROS     Positive for: Eyes (Hx zoster OS with long-term prednisolone; denies   prior surgery/trauma)    Negative for: Genitourinary (denies flomax), Endocrine (denies DM),   Cardiovascular (denies HTN), Respiratory (denies asthma/orhopnea),   Heme/Lymph (ASA)    Last edited by Preeti Reza MD on 8/25/2017 10:53 AM.   (History)        Assessment /Plan     For exam results, see Encounter Report.    Encounter for eye exam    Posterior subcapsular age-related cataract of left eye    Nuclear sclerosis, bilateral    Left cornea scar    History of herpes zoster    Refractive error          Posterior subcapsular age-related cataract of left eye - visually significant, recommend CE OS only. Pt advised that K scars from prior zoster may still cause glare and limit visual acuity.    Nuclear sclerosis, bilateral - visually significant OS combined with PSC, OD NVS.     Left cornea scar - few small opacities central, 1.7 mm opacity temporal. See above.     History of herpes zoster - see above.     Refractive  error - target emmetropia OS, toric not recommended based on K's.

## 2017-08-25 NOTE — PATIENT INSTRUCTIONS
What Are Cataracts?  A clear lens in the eye focuses light. This lets the eye see images sharply. With age, the lens slowly becomes cloudy. The cloudy lens is a cataract. A cataract scatters light and makes it hard for the eye to focus. Cataracts often form in both eyes. But one lens may cloud faster than the other.      The aging of your lens  Your lens may cloud so slowly that you don`t notice any vision changes at first. But as the cataract gets worse, the eye has a harder time focusing. In early stages, glasses may help you see better. As the lens gets more cloudy, your doctor may recommend surgery to restore your vision.  Date Last Reviewed: 6/14/2015 © 2000-2016 [employer]. [address] [place] [address]. All rights reserved. This information is not intended as a substitute for professional medical care. Always follow your healthcare professional's instructions.        What Are Cataracts?  A clear lens in the eye focuses light. This lets the eye see images sharply. With age, the lens slowly becomes cloudy. The cloudy lens is a cataract. A cataract scatters light and makes it hard for the eye to focus. Cataracts often form in both eyes. But one lens may cloud faster than the other.      The aging of your lens  Your lens may cloud so slowly that you don`t notice any vision changes at first. But as the cataract gets worse, the eye has a harder time focusing. In early stages, glasses may help you see better. As the lens gets more cloudy, your doctor may recommend surgery to restore your vision.  Date Last Reviewed: 6/14/2015 © 2000-2016 [employer]. [address] [place] [address]. All rights reserved. This information is not intended as a substitute for professional medical care. Always follow your healthcare professional's instructions.        Small-Incision Cataract Surgery: Removing the Old Lens  You may be surprised by how little time small-incision  cataract surgery takes.  The procedure  Your doctor uses a microscope and tiny tools to make the incision and remove the old lens. A special tool breaks apart the old lens with sound waves (ultrasound) and then takes out the pieces. This process is called phacoemulsification. The natural membrane (capsule) that held your lens is left in place.  Incision size  A smaller incision (top) means a shorter recovery time for you. The location of the incision will vary.         Date Last Reviewed: 6/14/2015  © 0123-5522 [employer]. [address] [place] [address]. All rights reserved. This information is not intended as a substitute for professional medical care. Always follow your healthcare professional's instructions.        Small-Incision Cataract Surgery: Implanting the New Lens  Once your old lens has been removed, your doctor slips the new lens (IOL or intraocular lens) in through the incision. The IOL is then placed in the capsule that held your old lens. With the new lens in place, your doctor is ready to close the incision. Some incisions may be closed with stitches. Others are self-sealing. That means they will stay closed on their own without stitches. The IOL does much the same thing as your old lens did before it became cloudy. It focuses light, letting you see sharp images and vivid colors. The IOL normally lasts a lifetime.  How small is an IOL?        Flexible tabs hold the IOL in place in the eye's natural capsule.     Date Last Reviewed: 6/14/2015 © 2000-2016 [employer]. [address] [place] [address]. All rights reserved. This information is not intended as a substitute for professional medical care. Always follow your healthcare professional's instructions.        Before Small-Incision Cataract Surgery    Like any operation, small-incision cataract surgery requires preparation.  Your health history  Your eye doctor will review your health history. Based  on that, he or she may order tests or talk to your other health care providers. Tell your eye doctor which medicines you take. That includes over-the-counter medicine such as aspirin.  Your eye exam  You will have a complete eye exam. Your eye doctor or a technician will use devices that measure the length and curve of your eye. These measurements let your doctor select the proper new lens (IOL or intraocular lens) for you.  The night before surgery  Dont eat or drink anything after midnight the night before your surgery. This includes water, coffee, chewing gum, and mints. If you have been told to continue your daily medicine, take it only with small sips of water. Make sure you follow any other instructions your doctor gives you.  The day of surgery  Have someone you know drive you to and from the outpatient surgery clinic or hospital. Plan to be there for about 2 to 3 hours. When you arrive, youll sign a consent form if you havent done so already. This form explains the risks of surgery. Just before surgery, your doctor will give you medicine that will relax you and keep you from feeling pain. You may sleep lightly.  Risks and complications  · Your doctor may have to shift from a small incision to a larger incision.  · There is a small chance of bleeding, infection, or swelling.   Date Last Reviewed: 6/14/2015 © 2000-2016 [employer]. [address] [place] [address], [place] [address]. All rights reserved. This information is not intended as a substitute for professional medical care. Always follow your healthcare professional's instructions.        After Small-Incision Cataract Surgery: The First 24 Hours    After surgery, youll rest in a recovery area for about an hour. Even though you may feel fine, you should take it easy. Your doctor will let you know what you should and shouldnt do once you get home. You may need to wear eye protection the first day. Also remember to take any eye drops or other  medicine your doctor prescribes.  Back at home  Spend your first day relaxing at home. Watch TV, read, or talk to a friend. Be careful to:  · Not rub your eye  · Not lift anything that makes you strain  · Not drink alcohol within the first 24 hours  What to expect  Its normal for your eye to be bruised or bloodshot at first. You may also feel itching or mild discomfort. You may have some short-term (temporary) fluid discharge. These wont last long.   Getting back in action  You may be able to get back to much of your routine on the first day. But with some tasks, your doctor may ask you to wait. Always follow your doctor's recommendations.  Here are some general guidelines:  · You can shower again in 2 to 3 days.  · You can cook again in 2 to 3 days.  · You can drive again in 5 to 7 days.  · You can exercise again in 14 days.  When to call your doctor  Call your doctor if:  · Your pain is not relieved by over-the-counter medicine.  · You have nausea or vomiting.  · Your vision suddenly becomes worse.   Date Last Reviewed: 6/14/2015  © 0530-2754 [employer]. [address] [place] [address], [place] [address]. All rights reserved. This information is not intended as a substitute for professional medical care. Always follow your healthcare professional's instructions.

## 2017-08-25 NOTE — LETTER
August 25, 2017      HOLDEN Black MD  [address] [place] [address]           [facility]  [address] [place] [address]  Phone: [phone]  Fax: [phone]          Patient: Samir Muñoz   MR Number: [id number]   YOB: 1960   Date of Visit: 8/25/2017       Dear Dr. HOLDEN Black:    Thank you for referring Samir Muñoz to me for evaluation. Attached you will find relevant portions of my assessment and plan of care.    If you have questions, please do not hesitate to call me. I look forward to following Samir Muñoz along with you.    Sincerely,    Preetiberny Reza MD    Enclosure  CC:  No Recipients    If you would like to receive this communication electronically, please contact [email or web address] or [phone] to request more information on [facility] Link access.    For providers and/or their staff who would like to refer a patient to [facility], please contact us through our one-stop-shop provider referral line, [facility], at [phone].    If you feel you have received this communication in error or would no longer like to receive these types of communications, please e-mail [email or web address]

## 2017-09-19 ENCOUNTER — PATIENT MESSAGE (OUTPATIENT)
Dept: OPHTHALMOLOGY | Facility: CLINIC | Age: 57
End: 2017-09-19

## 2017-09-19 ENCOUNTER — TELEPHONE (OUTPATIENT)
Dept: OPHTHALMOLOGY | Facility: CLINIC | Age: 57
End: 2017-09-19

## 2017-09-19 NOTE — TELEPHONE ENCOUNTER
----- Message from Liz Talavera sent at 9/19/2017  7:41 AM CDT -----  Contact: self  [phone]  Patient called asking to speak with the nurse regarding schedule appointment Friday 09/22/2017. Thanks!

## 2017-09-22 ENCOUNTER — OFFICE VISIT (OUTPATIENT)
Dept: OPHTHALMOLOGY | Facility: CLINIC | Age: 57
End: 2017-09-22
Payer: OTHER GOVERNMENT

## 2017-09-22 DIAGNOSIS — Z86.19 HISTORY OF HERPES ZOSTER: ICD-10-CM

## 2017-09-22 DIAGNOSIS — H25.042 POSTERIOR SUBCAPSULAR AGE-RELATED CATARACT OF LEFT EYE: ICD-10-CM

## 2017-09-22 DIAGNOSIS — E78.5 HYPERLIPIDEMIA: ICD-10-CM

## 2017-09-22 DIAGNOSIS — H52.7 REFRACTIVE ERROR: ICD-10-CM

## 2017-09-22 DIAGNOSIS — Z01.00 ENCOUNTER FOR EYE EXAM: Primary | ICD-10-CM

## 2017-09-22 DIAGNOSIS — H25.13 NUCLEAR SCLEROSIS, BILATERAL: ICD-10-CM

## 2017-09-22 DIAGNOSIS — H17.9 LEFT CORNEA SCAR: ICD-10-CM

## 2017-09-22 PROCEDURE — 99213 OFFICE O/P EST LOW 20 MIN: CPT | Mod: PBBFAC,PO | Performed by: OPHTHALMOLOGY

## 2017-09-22 PROCEDURE — 92136 OPHTHALMIC BIOMETRY: CPT | Mod: PBBFAC,PO,LT | Performed by: OPHTHALMOLOGY

## 2017-09-22 PROCEDURE — 92012 INTRM OPH EXAM EST PATIENT: CPT | Mod: S$PBB,,, | Performed by: OPHTHALMOLOGY

## 2017-09-22 PROCEDURE — 99999 PR PBB SHADOW E&M-EST. PATIENT-LVL III: CPT | Mod: PBBFAC,,, | Performed by: OPHTHALMOLOGY

## 2017-09-22 RX ORDER — LIDOCAINE HYDROCHLORIDE 40 MG/ML
1 INJECTION, SOLUTION RETROBULBAR
Status: CANCELLED | OUTPATIENT
Start: 2017-09-22 | End: 2017-09-22

## 2017-09-22 RX ORDER — SIMVASTATIN 20 MG/1
TABLET, FILM COATED ORAL
Qty: 30 TABLET | Refills: 2 | Status: SHIPPED | OUTPATIENT
Start: 2017-09-22 | End: 2018-02-26

## 2017-09-22 RX ORDER — PHENYLEPHRINE HYDROCHLORIDE 25 MG/ML
1 SOLUTION/ DROPS OPHTHALMIC
Status: CANCELLED | OUTPATIENT
Start: 2017-09-22

## 2017-09-22 RX ORDER — LIDOCAINE HYDROCHLORIDE 40 MG/ML
1 INJECTION, SOLUTION RETROBULBAR
Status: CANCELLED | OUTPATIENT
Start: 2017-09-22

## 2017-09-22 RX ORDER — PREDNISOLONE ACETATE 10 MG/ML
1 SUSPENSION/ DROPS OPHTHALMIC 4 TIMES DAILY
Qty: 5 ML | Refills: 2 | Status: SHIPPED | OUTPATIENT
Start: 2017-10-25 | End: 2017-12-21 | Stop reason: ALTCHOICE

## 2017-09-22 RX ORDER — PROPARACAINE HYDROCHLORIDE 5 MG/ML
1 SOLUTION/ DROPS OPHTHALMIC
Status: CANCELLED | OUTPATIENT
Start: 2017-09-22

## 2017-09-22 RX ORDER — PHENYLEPHRINE HYDROCHLORIDE 100 MG/ML
1 SOLUTION/ DROPS OPHTHALMIC ONCE
Status: CANCELLED | OUTPATIENT
Start: 2017-09-22 | End: 2017-09-22

## 2017-09-22 RX ORDER — MOXIFLOXACIN 5 MG/ML
1 SOLUTION/ DROPS OPHTHALMIC 4 TIMES DAILY
Qty: 3 ML | Refills: 0 | Status: SHIPPED | OUTPATIENT
Start: 2017-10-24 | End: 2017-12-21 | Stop reason: ALTCHOICE

## 2017-09-22 RX ORDER — CYCLOPENTOLATE HYDROCHLORIDE 10 MG/ML
1 SOLUTION/ DROPS OPHTHALMIC
Status: CANCELLED | OUTPATIENT
Start: 2017-09-22

## 2017-09-22 RX ORDER — TROPICAMIDE 10 MG/ML
1 SOLUTION/ DROPS OPHTHALMIC
Status: CANCELLED | OUTPATIENT
Start: 2017-09-22

## 2017-09-22 RX ORDER — MOXIFLOXACIN 5 MG/ML
1 SOLUTION/ DROPS OPHTHALMIC
Status: CANCELLED | OUTPATIENT
Start: 2017-09-22 | End: 2017-09-22

## 2017-09-22 RX ORDER — PROPARACAINE HYDROCHLORIDE 5 MG/ML
1 SOLUTION/ DROPS OPHTHALMIC
Status: CANCELLED | OUTPATIENT
Start: 2017-09-22 | End: 2017-09-22

## 2017-09-22 RX ORDER — KETOROLAC TROMETHAMINE 5 MG/ML
1 SOLUTION OPHTHALMIC 4 TIMES DAILY
Qty: 5 ML | Refills: 2 | Status: SHIPPED | OUTPATIENT
Start: 2017-10-22 | End: 2017-12-21 | Stop reason: ALTCHOICE

## 2017-09-22 NOTE — PROGRESS NOTES
Refill Authorization Note     is requesting a refill authorization.    Brief assessment and rational for refill: APPROVE; prr  Name of medication: SIMVASTATIN 20MG TAB  How patient will take medication: t1t po daily   Amount/Quantity of medication ordered: 90d            Refills Authorized: Yes  If authorized number of refills: 0        Medication Therapy Plan: Last labs almost a year; will order new lipids.  Approve for 90d.  Please schedule patient to obtain labs due 12/2017  Comments:   Lab Results   Component Value Date    CHOL 220 (H) 12/22/2016    CHOL 283 (H) 12/30/2015     Lab Results   Component Value Date    HDL 51 12/22/2016    HDL 55 12/30/2015     Lab Results   Component Value Date    LDLCALC 128.2 12/22/2016    LDLCALC 176.8 (H) 12/30/2015     Lab Results   Component Value Date    TRIG 204 (H) 12/22/2016    TRIG 256 (H) 12/30/2015     Lab Results   Component Value Date    CHOLHDL 23.2 12/22/2016    CHOLHDL 19.4 (L) 12/30/2015

## 2017-09-22 NOTE — PROGRESS NOTES
HPI     Pre-op Exam    Additional comments: Pre-op for cat sx OS 10/25           Comments   Pt here for pre-op for cataract sx OS. Pt states has a little pain OS, may   be nerve pain.        Last edited by Cheryle Quintana on 9/22/2017  1:33 PM. (History)        ROS     Positive for: Eyes (Hx zoster OS with long-term prednisolone; denies   prior surgery/trauma)    Negative for: Genitourinary (denies flomax), Endocrine (denies DM),   Cardiovascular (denies HTN), Respiratory (denies asthma/orhopnea),   Heme/Lymph (ASA)    Last edited by Preeti Reza MD on 9/22/2017  1:57 PM.   (History)        Assessment /Plan     For exam results, see Encounter Report.    Encounter for eye exam    Posterior subcapsular age-related cataract of left eye  -     Place in Outpatient; Standing  -     Vital signs; Standing  -     Diet NPO; Standing  -     Case Request Operating Room: phacoemulsification of cataract with intraocular lens implant left eye  -     IOL Master - OS - Left Eye    Nuclear sclerosis, bilateral  -     Place in Outpatient; Standing  -     Vital signs; Standing  -     Diet NPO; Standing  -     Case Request Operating Room: phacoemulsification of cataract with intraocular lens implant left eye  -     IOL Master - OS - Left Eye    Left cornea scar    History of herpes zoster    Refractive error    Other orders  -     proparacaine 0.5 % ophthalmic solution 1 drop; Place 1 drop into the left eye On call Procedure for Other (Surgery).  -     tropicamide 1% ophthalmic solution 1 drop; Place 1 drop into the left eye On call Procedure (Surgery).  -     phenylephrine HCL 2.5% ophthalmic solution 1 drop; Place 1 drop into the left eye On call Procedure for Irritation (Surgery).  -     cyclopentolate 1% ophthalmic solution 1 drop; Place 1 drop into the left eye On call Procedure for Other (Surgery).  -     phenylephrine HCL 10% ophthalmic solution 1 drop; Place 1 drop into the left eye once.  -     moxifloxacin 0.5 %  ophthalmic solution 1 drop; Place 1 drop into the left eye every 5 (five) minutes.  -     proparacaine 0.5 % ophthalmic solution 1 drop; Place 1 drop into the left eye every 5 (five) minutes.  -     lidocaine (PF) 40 mg/mL (4 %) injection 4 mg; 0.1 mLs (4 mg total) by Other route every 5 (five) minutes.  -     lidocaine (PF) 40 mg/mL (4 %) injection 4 mg; 0.1 mLs (4 mg total) by Other route as needed (eye pain).  -     moxifloxacin (VIGAMOX) 0.5 % ophthalmic solution; Place 1 drop into the left eye 4 (four) times daily. Start 1 day before cataract surgery.  Dispense: 3 mL; Refill: 0  -     prednisoLONE acetate (PRED FORTE) 1 % DrpS; Place 1 drop into the left eye 4 (four) times daily. Start on day of surgery.  Dispense: 5 mL; Refill: 2  -     ketorolac 0.5% (ACULAR) 0.5 % Drop; Place 1 drop into the left eye 4 (four) times daily. Start 3 days before surgery.  Dispense: 5 mL; Refill: 2            Posterior subcapsular age-related cataract of left eye - visually significant, recommend CE OS only. Pt advised that K scars from prior zoster may still cause glare and limit visual acuity. Pre-op done today for OS.     Nuclear sclerosis, bilateral - visually significant OS combined with PSC, OD NVS.     Left cornea scar - few small opacities central, 1.7 mm opacity temporal. See above.     History of herpes zoster - see above.     Refractive error - target emmetropia OS, toric not recommended based on K's.

## 2017-09-27 ENCOUNTER — TELEPHONE (OUTPATIENT)
Dept: OPHTHALMOLOGY | Facility: CLINIC | Age: 57
End: 2017-09-27

## 2017-10-13 ENCOUNTER — OFFICE VISIT (OUTPATIENT)
Dept: FAMILY MEDICINE | Facility: CLINIC | Age: 57
End: 2017-10-13
Payer: OTHER GOVERNMENT

## 2017-10-13 VITALS
DIASTOLIC BLOOD PRESSURE: 78 MMHG | OXYGEN SATURATION: 96 % | SYSTOLIC BLOOD PRESSURE: 114 MMHG | TEMPERATURE: 98 F | HEIGHT: 74 IN | RESPIRATION RATE: 16 BRPM | HEART RATE: 82 BPM | WEIGHT: 247.56 LBS | BODY MASS INDEX: 31.77 KG/M2

## 2017-10-13 DIAGNOSIS — Z01.818 PRE-OP EXAMINATION: Primary | ICD-10-CM

## 2017-10-13 DIAGNOSIS — H25.89 OTHER AGE-RELATED CATARACT OF LEFT EYE: ICD-10-CM

## 2017-10-13 PROCEDURE — 99999 PR PBB SHADOW E&M-EST. PATIENT-LVL III: CPT | Mod: PBBFAC,,, | Performed by: NURSE PRACTITIONER

## 2017-10-13 PROCEDURE — 93010 ELECTROCARDIOGRAM REPORT: CPT | Mod: ,,, | Performed by: INTERNAL MEDICINE

## 2017-10-13 PROCEDURE — 99213 OFFICE O/P EST LOW 20 MIN: CPT | Mod: S$PBB,,, | Performed by: NURSE PRACTITIONER

## 2017-10-13 PROCEDURE — 99213 OFFICE O/P EST LOW 20 MIN: CPT | Mod: PBBFAC,25,PO | Performed by: NURSE PRACTITIONER

## 2017-10-13 PROCEDURE — 93005 ELECTROCARDIOGRAM TRACING: CPT | Mod: PBBFAC,PO | Performed by: NURSE PRACTITIONER

## 2017-10-13 RX ORDER — MULTIVITAMIN
1 TABLET ORAL DAILY
COMMUNITY

## 2017-10-13 NOTE — PROGRESS NOTES
Subjective:       Patient ID: Samir Muñoz is a 57 y.o. male.    Chief Complaint: Surgical clearance (patient here for surgical celarance prior to cataract surgery)  He was last seen in primary care by me on 04/19/2017. He last saw Dr. Black on 01/20/2016.  HPI Scheduled for Left eye cataract surgery per Dr. Reza. No denies any changes in his health status since his last visit on 04/19/2017.   Vitals:    10/13/17 1322   BP: 114/78   Pulse: 82   Resp: 16   Temp: 98.3 °F (36.8 °C)     BP Readings from Last 3 Encounters:   10/13/17 114/78   07/26/17 120/68   04/19/17 110/70     Past Medical History:   Diagnosis Date    Cataract     OU    GERD (gastroesophageal reflux disease)     Hyperlipidemia     Shingles     involved eye    Sleep apnea     resolved with UPPP     Lab Results   Component Value Date    WBC 5.13 12/22/2016    HGB 16.0 12/22/2016    HCT 45.8 12/22/2016    MCV 88 12/22/2016     12/22/2016     CMP  Sodium   Date Value Ref Range Status   12/22/2016 140 136 - 145 mmol/L Final     Potassium   Date Value Ref Range Status   12/22/2016 4.4 3.5 - 5.1 mmol/L Final     Chloride   Date Value Ref Range Status   12/22/2016 102 95 - 110 mmol/L Final     CO2   Date Value Ref Range Status   12/22/2016 32 (H) 23 - 29 mmol/L Final     Glucose   Date Value Ref Range Status   12/22/2016 92 70 - 110 mg/dL Final     BUN, Bld   Date Value Ref Range Status   12/22/2016 15 6 - 20 mg/dL Final     Creatinine   Date Value Ref Range Status   12/22/2016 1.3 0.5 - 1.4 mg/dL Final     Calcium   Date Value Ref Range Status   12/22/2016 9.7 8.7 - 10.5 mg/dL Final     Total Protein   Date Value Ref Range Status   12/22/2016 7.3 6.0 - 8.4 g/dL Final     Albumin   Date Value Ref Range Status   12/22/2016 3.9 3.5 - 5.2 g/dL Final     Total Bilirubin   Date Value Ref Range Status   12/22/2016 0.7 0.1 - 1.0 mg/dL Final     Comment:     For infants and newborns, interpretation of results should be based  on gestational  age, weight and in agreement with clinical  observations.  Premature Infant recommended reference ranges:  Up to 24 hours.............<8.0 mg/dL  Up to 48 hours............<12.0 mg/dL  3-5 days..................<15.0 mg/dL  6-29 days.................<15.0 mg/dL       Alkaline Phosphatase   Date Value Ref Range Status   12/22/2016 71 55 - 135 U/L Final     AST   Date Value Ref Range Status   12/22/2016 37 10 - 40 U/L Final     ALT   Date Value Ref Range Status   12/22/2016 56 (H) 10 - 44 U/L Final     Anion Gap   Date Value Ref Range Status   12/22/2016 6 (L) 8 - 16 mmol/L Final     eGFR if    Date Value Ref Range Status   12/22/2016 >60.0 >60 mL/min/1.73 m^2 Final     eGFR if non    Date Value Ref Range Status   12/22/2016 >60.0 >60 mL/min/1.73 m^2 Final     Comment:     Calculation used to obtain the estimated glomerular filtration  rate (eGFR) is the CKD-EPI equation. Since race is unknown   in our information system, the eGFR values for   -American and Non--American patients are given   for each creatinine result.       No results found for: LABA1C, HGBA1C  EKG: Today  Cardiac Clearance Needed: NO   Anticoagulants: NO     Review of Systems    Objective:      Physical Exam   Constitutional: He is oriented to person, place, and time. Vital signs are normal. He appears well-developed and well-nourished. He is active and cooperative.   HENT:   Head: Normocephalic and atraumatic.   Right Ear: Hearing, tympanic membrane, external ear and ear canal normal.   Left Ear: Hearing, tympanic membrane, external ear and ear canal normal.   Nose: Nose normal.   Mouth/Throat: Uvula is midline, oropharynx is clear and moist and mucous membranes are normal.   Eyes: Conjunctivae, EOM and lids are normal. Pupils are equal, round, and reactive to light. Right eye exhibits no chemosis, no discharge, no exudate and no hordeolum. No foreign body present in the right eye. Left eye exhibits no  chemosis, no discharge, no exudate and no hordeolum. No foreign body present in the left eye. No scleral icterus.   Neck: Trachea normal, normal range of motion, full passive range of motion without pain and phonation normal. Neck supple.   Cardiovascular: Normal rate, regular rhythm, S1 normal, S2 normal and normal heart sounds.    Pulmonary/Chest: Effort normal and breath sounds normal.   Abdominal: Soft. Bowel sounds are normal. There is no splenomegaly or hepatomegaly. There is no tenderness.   Musculoskeletal: Normal range of motion.   Lymphadenopathy:        Head (right side): No submental, no submandibular, no tonsillar, no preauricular, no posterior auricular and no occipital adenopathy present.        Head (left side): No submental, no submandibular, no tonsillar, no preauricular, no posterior auricular and no occipital adenopathy present.     He has no cervical adenopathy.        Right cervical: No superficial cervical, no deep cervical and no posterior cervical adenopathy present.       Left cervical: No superficial cervical, no deep cervical and no posterior cervical adenopathy present.   Neurological: He is alert and oriented to person, place, and time.   Skin: Skin is warm, dry and intact.   Psychiatric: He has a normal mood and affect. His speech is normal and behavior is normal. Judgment and thought content normal. Cognition and memory are normal.   Nursing note and vitals reviewed.      Assessment & Plan:       Pre-op examination  -     IN OFFICE EKG 12-LEAD (to Muse)    Other age-related cataract of left eye        He is cleared for cataract surgery to his Left eye.   This note will be forwarded to Dr. Reza  No Follow-up on file.

## 2017-10-24 ENCOUNTER — ANESTHESIA EVENT (OUTPATIENT)
Dept: SURGERY | Facility: HOSPITAL | Age: 57
End: 2017-10-24
Payer: OTHER GOVERNMENT

## 2017-10-25 ENCOUNTER — HOSPITAL ENCOUNTER (OUTPATIENT)
Facility: HOSPITAL | Age: 57
Discharge: HOME | End: 2017-10-25
Attending: OPHTHALMOLOGY | Admitting: OPHTHALMOLOGY
Payer: OTHER GOVERNMENT

## 2017-10-25 ENCOUNTER — ANESTHESIA (OUTPATIENT)
Dept: SURGERY | Facility: HOSPITAL | Age: 57
End: 2017-10-25
Payer: OTHER GOVERNMENT

## 2017-10-25 ENCOUNTER — SURGERY (OUTPATIENT)
Age: 57
End: 2017-10-25

## 2017-10-25 VITALS
TEMPERATURE: 98 F | SYSTOLIC BLOOD PRESSURE: 130 MMHG | HEART RATE: 59 BPM | WEIGHT: 245 LBS | DIASTOLIC BLOOD PRESSURE: 71 MMHG | HEIGHT: 74 IN | OXYGEN SATURATION: 99 % | RESPIRATION RATE: 15 BRPM | BODY MASS INDEX: 31.44 KG/M2

## 2017-10-25 DIAGNOSIS — Z01.00 ENCOUNTER FOR EYE EXAM: ICD-10-CM

## 2017-10-25 DIAGNOSIS — H25.13 NUCLEAR SCLEROSIS, BILATERAL: ICD-10-CM

## 2017-10-25 DIAGNOSIS — H25.042 POSTERIOR SUBCAPSULAR AGE-RELATED CATARACT OF LEFT EYE: ICD-10-CM

## 2017-10-25 PROCEDURE — 25000003 PHARM REV CODE 250: Mod: PO | Performed by: ANESTHESIOLOGY

## 2017-10-25 PROCEDURE — 36000706: Mod: PO | Performed by: OPHTHALMOLOGY

## 2017-10-25 PROCEDURE — 71000033 HC RECOVERY, INTIAL HOUR: Mod: PO | Performed by: OPHTHALMOLOGY

## 2017-10-25 PROCEDURE — 37000009 HC ANESTHESIA EA ADD 15 MINS: Mod: PO | Performed by: OPHTHALMOLOGY

## 2017-10-25 PROCEDURE — D9220A PRA ANESTHESIA: Mod: ANES,,, | Performed by: ANESTHESIOLOGY

## 2017-10-25 PROCEDURE — D9220A PRA ANESTHESIA: Mod: CRNA,,, | Performed by: NURSE ANESTHETIST, CERTIFIED REGISTERED

## 2017-10-25 PROCEDURE — 25000003 PHARM REV CODE 250: Mod: PO | Performed by: OPHTHALMOLOGY

## 2017-10-25 PROCEDURE — 66984 XCAPSL CTRC RMVL W/O ECP: CPT | Mod: LT,,, | Performed by: OPHTHALMOLOGY

## 2017-10-25 PROCEDURE — 37000008 HC ANESTHESIA 1ST 15 MINUTES: Mod: PO | Performed by: OPHTHALMOLOGY

## 2017-10-25 PROCEDURE — V2632 POST CHMBR INTRAOCULAR LENS: HCPCS | Mod: PO | Performed by: OPHTHALMOLOGY

## 2017-10-25 PROCEDURE — 36000707: Mod: PO | Performed by: OPHTHALMOLOGY

## 2017-10-25 PROCEDURE — 63600175 PHARM REV CODE 636 W HCPCS: Mod: PO | Performed by: NURSE ANESTHETIST, CERTIFIED REGISTERED

## 2017-10-25 PROCEDURE — 63600175 PHARM REV CODE 636 W HCPCS: Mod: PO | Performed by: OPHTHALMOLOGY

## 2017-10-25 DEVICE — LENS 19.0 ACRYSOF: Type: IMPLANTABLE DEVICE | Site: EYE | Status: FUNCTIONAL

## 2017-10-25 RX ORDER — MOXIFLOXACIN 5 MG/ML
1 SOLUTION/ DROPS OPHTHALMIC
Status: COMPLETED | OUTPATIENT
Start: 2017-10-25 | End: 2017-10-25

## 2017-10-25 RX ORDER — PROPARACAINE HYDROCHLORIDE 5 MG/ML
1 SOLUTION/ DROPS OPHTHALMIC
Status: COMPLETED | OUTPATIENT
Start: 2017-10-25 | End: 2017-10-25

## 2017-10-25 RX ORDER — PROPARACAINE HYDROCHLORIDE 5 MG/ML
1 SOLUTION/ DROPS OPHTHALMIC
Status: CANCELLED | OUTPATIENT
Start: 2017-10-25

## 2017-10-25 RX ORDER — PROPARACAINE HYDROCHLORIDE 5 MG/ML
1 SOLUTION/ DROPS OPHTHALMIC
Status: CANCELLED | OUTPATIENT
Start: 2017-10-25 | End: 2017-10-25

## 2017-10-25 RX ORDER — MOXIFLOXACIN 5 MG/ML
SOLUTION/ DROPS OPHTHALMIC
Status: DISCONTINUED | OUTPATIENT
Start: 2017-10-25 | End: 2017-10-25 | Stop reason: HOSPADM

## 2017-10-25 RX ORDER — CYCLOPENTOLATE HYDROCHLORIDE 10 MG/ML
1 SOLUTION/ DROPS OPHTHALMIC
Status: DISCONTINUED | OUTPATIENT
Start: 2017-10-25 | End: 2017-10-25 | Stop reason: HOSPADM

## 2017-10-25 RX ORDER — ONDANSETRON 2 MG/ML
INJECTION INTRAMUSCULAR; INTRAVENOUS
Status: DISCONTINUED | OUTPATIENT
Start: 2017-10-25 | End: 2017-10-25

## 2017-10-25 RX ORDER — LIDOCAINE HYDROCHLORIDE 10 MG/ML
INJECTION, SOLUTION EPIDURAL; INFILTRATION; INTRACAUDAL; PERINEURAL
Status: DISCONTINUED | OUTPATIENT
Start: 2017-10-25 | End: 2017-10-25 | Stop reason: HOSPADM

## 2017-10-25 RX ORDER — LIDOCAINE HYDROCHLORIDE 40 MG/ML
1 INJECTION, SOLUTION RETROBULBAR
Status: DISCONTINUED | OUTPATIENT
Start: 2017-10-25 | End: 2017-10-25 | Stop reason: HOSPADM

## 2017-10-25 RX ORDER — LIDOCAINE HYDROCHLORIDE 40 MG/ML
1 INJECTION, SOLUTION RETROBULBAR
Status: COMPLETED | OUTPATIENT
Start: 2017-10-25 | End: 2017-10-25

## 2017-10-25 RX ORDER — CYCLOPENTOLATE HYDROCHLORIDE 10 MG/ML
1 SOLUTION/ DROPS OPHTHALMIC
Status: CANCELLED | OUTPATIENT
Start: 2017-10-25

## 2017-10-25 RX ORDER — PROPARACAINE HYDROCHLORIDE 5 MG/ML
1 SOLUTION/ DROPS OPHTHALMIC
Status: DISCONTINUED | OUTPATIENT
Start: 2017-10-25 | End: 2017-10-25 | Stop reason: HOSPADM

## 2017-10-25 RX ORDER — MIDAZOLAM HYDROCHLORIDE 1 MG/ML
INJECTION, SOLUTION INTRAMUSCULAR; INTRAVENOUS
Status: DISCONTINUED | OUTPATIENT
Start: 2017-10-25 | End: 2017-10-25

## 2017-10-25 RX ORDER — LIDOCAINE HYDROCHLORIDE 10 MG/ML
1 INJECTION, SOLUTION EPIDURAL; INFILTRATION; INTRACAUDAL; PERINEURAL ONCE
Status: COMPLETED | OUTPATIENT
Start: 2017-10-25 | End: 2017-10-25

## 2017-10-25 RX ORDER — MOXIFLOXACIN 5 MG/ML
1 SOLUTION/ DROPS OPHTHALMIC
Status: CANCELLED | OUTPATIENT
Start: 2017-10-25 | End: 2017-10-25

## 2017-10-25 RX ORDER — ACETAMINOPHEN 325 MG/1
650 TABLET ORAL EVERY 6 HOURS PRN
Status: DISCONTINUED | OUTPATIENT
Start: 2017-10-25 | End: 2017-10-25 | Stop reason: HOSPADM

## 2017-10-25 RX ORDER — TROPICAMIDE 10 MG/ML
1 SOLUTION/ DROPS OPHTHALMIC
Status: CANCELLED | OUTPATIENT
Start: 2017-10-25

## 2017-10-25 RX ORDER — PHENYLEPHRINE HYDROCHLORIDE 25 MG/ML
1 SOLUTION/ DROPS OPHTHALMIC
Status: CANCELLED | OUTPATIENT
Start: 2017-10-25

## 2017-10-25 RX ORDER — PHENYLEPHRINE HYDROCHLORIDE 25 MG/ML
1 SOLUTION/ DROPS OPHTHALMIC
Status: DISCONTINUED | OUTPATIENT
Start: 2017-10-25 | End: 2017-10-25 | Stop reason: HOSPADM

## 2017-10-25 RX ORDER — PHENYLEPHRINE HYDROCHLORIDE 100 MG/ML
1 SOLUTION/ DROPS OPHTHALMIC ONCE
Status: DISCONTINUED | OUTPATIENT
Start: 2017-10-25 | End: 2017-10-25 | Stop reason: HOSPADM

## 2017-10-25 RX ORDER — LIDOCAINE HYDROCHLORIDE 40 MG/ML
1 INJECTION, SOLUTION RETROBULBAR
Status: CANCELLED | OUTPATIENT
Start: 2017-10-25 | End: 2017-10-25

## 2017-10-25 RX ORDER — LIDOCAINE HYDROCHLORIDE 40 MG/ML
1 INJECTION, SOLUTION RETROBULBAR
Status: CANCELLED | OUTPATIENT
Start: 2017-10-25

## 2017-10-25 RX ORDER — TROPICAMIDE 10 MG/ML
1 SOLUTION/ DROPS OPHTHALMIC
Status: DISCONTINUED | OUTPATIENT
Start: 2017-10-25 | End: 2017-10-25 | Stop reason: HOSPADM

## 2017-10-25 RX ORDER — PHENYLEPHRINE HYDROCHLORIDE 100 MG/ML
1 SOLUTION/ DROPS OPHTHALMIC ONCE
Status: CANCELLED | OUTPATIENT
Start: 2017-10-25 | End: 2017-10-25

## 2017-10-25 RX ORDER — SODIUM CHLORIDE, SODIUM LACTATE, POTASSIUM CHLORIDE, CALCIUM CHLORIDE 600; 310; 30; 20 MG/100ML; MG/100ML; MG/100ML; MG/100ML
INJECTION, SOLUTION INTRAVENOUS CONTINUOUS
Status: DISCONTINUED | OUTPATIENT
Start: 2017-10-25 | End: 2017-10-25 | Stop reason: HOSPADM

## 2017-10-25 RX ORDER — EPINEPHRINE 1 MG/ML
INJECTION INTRAMUSCULAR; INTRAVENOUS; SUBCUTANEOUS
Status: DISCONTINUED | OUTPATIENT
Start: 2017-10-25 | End: 2017-10-25 | Stop reason: HOSPADM

## 2017-10-25 RX ADMIN — MOXIFLOXACIN HYDROCHLORIDE 1 DROP: 5 SOLUTION/ DROPS OPHTHALMIC at 08:10

## 2017-10-25 RX ADMIN — TROPICAMIDE 1 DROP: 10 SOLUTION/ DROPS OPHTHALMIC at 08:10

## 2017-10-25 RX ADMIN — PHENYLEPHRINE HYDROCHLORIDE 1 DROP: 25 SOLUTION/ DROPS OPHTHALMIC at 08:10

## 2017-10-25 RX ADMIN — LIDOCAINE HYDROCHLORIDE 1 DROP: 40 INJECTION, SOLUTION RETROBULBAR; TOPICAL at 08:10

## 2017-10-25 RX ADMIN — MOXIFLOXACIN HYDROCHLORIDE 1 DROP: 5 SOLUTION/ DROPS OPHTHALMIC at 09:10

## 2017-10-25 RX ADMIN — LIDOCAINE HYDROCHLORIDE: 10 INJECTION, SOLUTION EPIDURAL; INFILTRATION; INTRACAUDAL; PERINEURAL at 08:10

## 2017-10-25 RX ADMIN — CYCLOPENTOLATE HYDROCHLORIDE 1 DROP: 10 SOLUTION/ DROPS OPHTHALMIC at 08:10

## 2017-10-25 RX ADMIN — PROPARACAINE HYDROCHLORIDE 1 DROP: 5 SOLUTION/ DROPS OPHTHALMIC at 08:10

## 2017-10-25 RX ADMIN — EPINEPHRINE 1 MG: 1 INJECTION, SOLUTION INTRAMUSCULAR; INTRAVENOUS; SUBCUTANEOUS at 09:10

## 2017-10-25 RX ADMIN — LIDOCAINE HYDROCHLORIDE 1 ML: 10 INJECTION, SOLUTION EPIDURAL; INFILTRATION; INTRACAUDAL; PERINEURAL at 09:10

## 2017-10-25 RX ADMIN — SODIUM HYALURONATE 0.8 MG: 10 INJECTION INTRAOCULAR at 09:10

## 2017-10-25 RX ADMIN — ONDANSETRON 4 MG: 2 INJECTION, SOLUTION INTRAMUSCULAR; INTRAVENOUS at 08:10

## 2017-10-25 RX ADMIN — SODIUM CHLORIDE, SODIUM LACTATE, POTASSIUM CHLORIDE, AND CALCIUM CHLORIDE: .6; .31; .03; .02 INJECTION, SOLUTION INTRAVENOUS at 08:10

## 2017-10-25 RX ADMIN — MIDAZOLAM HYDROCHLORIDE 2 MG: 1 INJECTION, SOLUTION INTRAMUSCULAR; INTRAVENOUS at 08:10

## 2017-10-25 RX ADMIN — Medication 0.5 ML: at 09:10

## 2017-10-25 RX ADMIN — BALANCED SALT SOLUTION 500 ML: 6.4; .75; .48; .3; 3.9; 1.7 SOLUTION OPHTHALMIC at 09:10

## 2017-10-25 NOTE — BRIEF OP NOTE
Operative Note     SUMMARY     Surgery Date: 10/25/2017     Surgeon(s) and Role:     * Preeti Reza MD - Primary    Pre-op Diagnosis:  Nuclear sclerosis, bilateral [H25.13]  Posterior subcapsular age-related cataract of left eye [H25.042]    Post-op Diagnosis:  Nuclear sclerosis, bilateral [H25.13]  Posterior subcapsular age-related cataract of left eye [H25.042]    Procedure(s) (LRB):  phacoemulsification of cataract with intraocular lens implant left eye (Left)    Anesthesia: Local MAC    Findings/Key Components:  Same as post op diagnosis    Estimated Blood Loss: * No values recorded between 10/25/2017  9:05 AM and 10/25/2017  9:23 AM *         Specimens     None            Discharge Note      SUMMARY     Admit Date: 10/25/2017    Attending Physician: Preeti Reza*     Discharge Physician: Preeti Reza*    Discharge Date: 10/25/2017     Final Diagnosis: Nuclear sclerosis, bilateral [H25.13]  Posterior subcapsular age-related cataract of left eye [H25.042]    Hospital Course: The patient was admitted for outpatient surgery and tolerated the procedure well. The patient was discharged home in stable condition the same day.    Diet: Advance as tolerated    Follow-Up: Follow up with Dr. Reza, next day, as previously scheduled  Activity as tolerated.      Activity: Per Handout Instructions    Disposition: Home or self care    Patient Instructions:   Current Discharge Medication List      CONTINUE these medications which have NOT CHANGED    Details   ASCORBATE CALCIUM (MEI-C ORAL) Take 1 tablet by mouth once daily.      ketorolac 0.5% (ACULAR) 0.5 % Drop Place 1 drop into the left eye 4 (four) times daily. Start 3 days before surgery.  Qty: 5 mL, Refills: 2      loratadine (CLARITIN) 10 mg tablet Take 1 tablet (10 mg total) by mouth once daily.  Qty: 30 tablet, Refills: 3    Associated Diagnoses: Other chronic allergic conjunctivitis of both eyes      moxifloxacin (VIGAMOX) 0.5  % ophthalmic solution Place 1 drop into the left eye 4 (four) times daily. Start 1 day before cataract surgery.  Qty: 3 mL, Refills: 0      multivitamin (ONE DAILY MULTIVITAMIN) per tablet Take 1 tablet by mouth once daily.      prednisoLONE acetate (PRED FORTE) 1 % DrpS Place 1 drop into the left eye 4 (four) times daily. Start on day of surgery.  Qty: 5 mL, Refills: 2      simvastatin (ZOCOR) 20 MG tablet TAKE ONE TABLET BY MOUTH IN THE EVENING  Qty: 30 tablet, Refills: 2    Associated Diagnoses: Hyperlipidemia      ibuprofen (ADVIL,MOTRIN) 200 MG tablet Take 200 mg by mouth every 6 (six) hours as needed for Pain.             Discharge Procedure Orders (must include Diet, Follow-up, Activity)  No discharge procedures on file.

## 2017-10-25 NOTE — DISCHARGE INSTRUCTIONS
Recovery After Procedural Sedation (Adult)  You have been given medicine by vein to make you sleep during your surgery. This may have included both a pain medicine and sleeping medicine. Most of the effects have worn off. But you may still have some drowsiness for the next 6 to 8 hours.  Home care  Follow these guidelines when you get home:  · For the next 8 hours, you should be watched by a responsible adult. This person should make sure your condition is not getting worse.  · Don't drink any alcohol for the next 24 hours.  · Don't drive, operate dangerous machinery, or make important business or personal decisions during the next 24 hours.  Note: Your healthcare provider may tell you not to take any medicine by mouth for pain or sleep in the next 4 hours. These medicines may react with the medicines you were given in the hospital. This could cause a much stronger response than usual.  Follow-up care  Follow up with your healthcare provider if you are not alert and back to your usual level of activity within 12 hours.  When to seek medical advice  Call your healthcare provider right away if any of these occur:  · Drowsiness gets worse  · Weakness or dizziness gets worse  · Repeated vomiting  · You can't be awakened   Date Last Reviewed: 10/18/2016  © 5890-5717 [employer]. [address] [place] [address], [place] [address]. All rights reserved. This information is not intended as a substitute for professional medical care. Always follow your healthcare professional's instructions.

## 2017-10-25 NOTE — INTERVAL H&P NOTE
"See PCP H&P. No changes noted. Heart and lungs per anesthesia. "This patient has been cleared for surgery in an ambulatory surgery center/facility."    Vitals:    10/25/17 0757   BP: 123/86   Pulse: 63   Resp: 18   Temp: 97.5 °F (36.4 °C)       "

## 2017-10-25 NOTE — ANESTHESIA PREPROCEDURE EVALUATION
10/25/2017  Samir Muñoz is a 57 y.o., male.    Anesthesia Evaluation    I have reviewed the Patient Summary Reports.    I have reviewed the Nursing Notes.   I have reviewed the Medications.     Review of Systems  Anesthesia Hx:  No problems with previous Anesthesia    Social:  Non-Smoker    Cardiovascular:  Cardiovascular Normal     Pulmonary:   Sleep Apnea    Renal/:  Renal/ Normal     Hepatic/GI:   GERD, well controlled    Neurological:   Neuromuscular Disease,    Endocrine:  Endocrine Normal        Physical Exam  General:  Well nourished    Airway/Jaw/Neck:  Airway Findings: Mouth Opening: Normal Tongue: Normal  General Airway Assessment: Adult  Oropharynx Findings:  Mallampati: II  Jaw/Neck Findings:  Neck ROM: Normal ROM     Eyes/Ears/Nose:  Eyes/Ears/Nose Findings:    Dental:  Dental Findings:   Chest/Lungs:  Chest/Lungs Findings: Clear to auscultation, Normal Respiratory Rate     Heart/Vascular:  Heart Findings: Rate: Normal  Rhythm: Regular Rhythm        Mental Status:  Mental Status Findings:  Cooperative, Alert and Oriented         Anesthesia Plan  Type of Anesthesia, risks & benefits discussed:  Anesthesia Type:  general  Patient's Preference:   Intra-op Monitoring Plan:   Intra-op Monitoring Plan Comments:   Post Op Pain Control Plan: multimodal analgesia  Post Op Pain Control Plan Comments:   Induction:   IV  Beta Blocker:  Patient is not currently on a Beta-Blocker (No further documentation required).       Informed Consent: Patient understands risks and agrees with Anesthesia plan.  Questions answered. Anesthesia consent signed with patient.  ASA Score: 2     Day of Surgery Review of History & Physical:  There are no significant changes.   H&P completed by Anesthesiologist.       Ready For Surgery From Anesthesia Perspective.

## 2017-10-25 NOTE — TRANSFER OF CARE
"Anesthesia Transfer of Care Note    Patient: Samir Muñoz    Procedure(s) Performed: Procedure(s) (LRB):  phacoemulsification of cataract with intraocular lens implant left eye (Left)    Anesthesia PACU Handoff    Last vitals:   Visit Vitals  /71 (BP Location: Left arm, Patient Position: Sitting)   Pulse (!) 59   Temp 36.7 °C (98 °F) (Skin)   Resp 15   Ht 6' 2" (1.88 m)   Wt 111.1 kg (245 lb)   SpO2 99%   BMI 31.46 kg/m²     "

## 2017-10-25 NOTE — PROGRESS NOTES
Assessment /Plan     For exam results, see Encounter Report.    Post-operative state    Posterior subcapsular age-related cataract of left eye    Nuclear sclerosis, bilateral    Left cornea scar    History of herpes zoster    Refractive error            Posterior subcapsular age-related cataract of left eye - POD #1 s/p Phaco/PCIOL OS on 10/25/17. Doing well. Continue drops QID. Precautions reviewed. RTC 1 week  visually significant, recommend CE OS only. Pt advised that K scars from prior zoster may still cause glare and limit visual acuity.     Nuclear sclerosis, bilateral - visually significant OS combined with PSC, OD NVS.     Left cornea scar - few small opacities central, 1.7 mm opacity temporal. See above.     History of herpes zoster - see above.     Refractive error - target emmetropia OS, toric not recommended based on K's.

## 2017-10-25 NOTE — OP NOTE
Date of surgery: 10/25/2017    Operative Report    Preoperative Diagnosis: Nuclear sclerosis, left eye    Postoperative Diagnosis: Nuclear sclerosis, left eye    Procedure: Phacoemulsification with posterior chamber intraocular lens, left eye    Surgeon: Preeti Reza M.D.    Anesthesia: MAC with topical    Procedure in detail:   Informed consent was obtained. Indications, risks and alternatives to the procedure were explained to the patient. The patient was given the opportunity to ask questions and consented to the procedure in writing. In the preoperative holding area, the patients identity and operative site were confirmed.  Topical anesthetic was administered, consisting of alternating 0.5% Proparacaine and 4% preservative-free Lidocaine Q 5 minutes times 3.  The patient was taken to the operative suite.  A time-out was performed.  The left eye was prepped with 5% Betadine and draped in sterile fashion.  A lid speculum was placed.  A paracentesis was made at the 4 oclock position. 1% preservative-free lidocaine was placed in the anterior chamber, followed by Viscoat.  A bi-planar clear corneal incision was made at the 2 oclock position.  The cystotome was used to begin the continuous curvilinear capsulorrhexis, which was completed with the Utrata forceps.  BSS on a blunt-tipped cannula was used to hydrodissect and hydrodelineate the lens nucleus until it was noted to rotate freely.  Phacoemulsification was used in a divide-and-conquer technique to remove the lens nucleus, followed by irrigation and aspiration of the lens cortex.  The capsular bag was inflated with Healon.  The lens, which was an Asaf Acrysoft IQ IOL, model SN60WF, power 19.0, was placed in the capsular bag and rotated into position.  The remaining viscoelastic material was removed by I&A.  The wound and paracentesis were hydrated.  The lens was centered.  The anterior chamber was deep.  The eye pressure was good.  The wounds were checked  and watertight.  The lid speculum and drape were removed.  A drop of Vigamox was placed in the eye.  A clear shield was taped in place over the eye.    TOTAL ULTRASOUND TIME:  50.3 seconds, grade 1    PERCENTAGE OF TIME IN POSITION 3: 10.7 %    Estimated blood loss:  none    Specimens:   none  Complications:  none    Disposition:   stable to PACU

## 2017-10-25 NOTE — ANESTHESIA POSTPROCEDURE EVALUATION
"Anesthesia Post Evaluation    Patient: Samir Muñoz    Procedure(s) Performed: Procedure(s) (LRB):  phacoemulsification of cataract with intraocular lens implant left eye (Left)    Final Anesthesia Type: general  Patient location during evaluation: PACU  Patient participation: Yes- Able to Participate  Level of consciousness: awake and alert and oriented  Post-procedure vital signs: reviewed and stable  Pain management: adequate  Airway patency: patent  PONV status at discharge: No PONV  Anesthetic complications: no      Cardiovascular status: blood pressure returned to baseline and stable  Respiratory status: unassisted and spontaneous ventilation  Hydration status: euvolemic  Follow-up not needed.        Visit Vitals  /71 (BP Location: Left arm, Patient Position: Sitting)   Pulse (!) 59   Temp 36.7 °C (98 °F) (Skin)   Resp 15   Ht 6' 2" (1.88 m)   Wt 111.1 kg (245 lb)   SpO2 99%   BMI 31.46 kg/m²       Pain/Dakota Score: Pain Assessment Performed: Yes (10/25/2017  9:42 AM)  Presence of Pain: denies (10/25/2017  9:42 AM)  Dakota Score: 10 (10/25/2017  9:42 AM)      "

## 2017-10-25 NOTE — TRANSFER OF CARE
"Anesthesia Transfer of Care Note    Patient: Samir Muñoz    Procedure(s) Performed: Procedure(s) (LRB):  phacoemulsification of cataract with intraocular lens implant left eye (Left)    Patient location: PACU    Anesthesia Type: MAC    Transport from OR: Transported from OR on room air with adequate spontaneous ventilation    Post pain: adequate analgesia    Post assessment: no apparent anesthetic complications    Post vital signs: stable    Level of consciousness: awake    Nausea/Vomiting: no nausea/vomiting    Complications: none    Transfer of care protocol was followed      Last vitals:   Visit Vitals  /86 (BP Location: Right arm, Patient Position: Lying)   Pulse 63   Temp 36.4 °C (97.5 °F) (Skin)   Resp 18   Ht 6' 2" (1.88 m)   Wt 111.1 kg (245 lb)   SpO2 100%   BMI 31.46 kg/m²     "

## 2017-10-26 ENCOUNTER — OFFICE VISIT (OUTPATIENT)
Dept: OPHTHALMOLOGY | Facility: CLINIC | Age: 57
End: 2017-10-26
Payer: OTHER GOVERNMENT

## 2017-10-26 DIAGNOSIS — H52.7 REFRACTIVE ERROR: ICD-10-CM

## 2017-10-26 DIAGNOSIS — Z98.890 POST-OPERATIVE STATE: Primary | ICD-10-CM

## 2017-10-26 DIAGNOSIS — H17.9 LEFT CORNEA SCAR: ICD-10-CM

## 2017-10-26 DIAGNOSIS — H25.042 POSTERIOR SUBCAPSULAR AGE-RELATED CATARACT OF LEFT EYE: ICD-10-CM

## 2017-10-26 DIAGNOSIS — Z86.19 HISTORY OF HERPES ZOSTER: ICD-10-CM

## 2017-10-26 DIAGNOSIS — H25.13 NUCLEAR SCLEROSIS, BILATERAL: ICD-10-CM

## 2017-10-26 PROCEDURE — 99213 OFFICE O/P EST LOW 20 MIN: CPT | Mod: PBBFAC,PO | Performed by: OPHTHALMOLOGY

## 2017-10-26 PROCEDURE — 99999 PR PBB SHADOW E&M-EST. PATIENT-LVL III: CPT | Mod: PBBFAC,,, | Performed by: OPHTHALMOLOGY

## 2017-10-26 PROCEDURE — 99024 POSTOP FOLLOW-UP VISIT: CPT | Mod: ,,, | Performed by: OPHTHALMOLOGY

## 2017-10-31 ENCOUNTER — OFFICE VISIT (OUTPATIENT)
Dept: OPHTHALMOLOGY | Facility: CLINIC | Age: 57
End: 2017-10-31
Payer: OTHER GOVERNMENT

## 2017-10-31 DIAGNOSIS — Z86.19 HISTORY OF HERPES ZOSTER: ICD-10-CM

## 2017-10-31 DIAGNOSIS — H25.11 NUCLEAR SCLEROSIS, RIGHT: ICD-10-CM

## 2017-10-31 DIAGNOSIS — Z98.890 POST-OPERATIVE STATE: Primary | ICD-10-CM

## 2017-10-31 DIAGNOSIS — H17.9 LEFT CORNEA SCAR: ICD-10-CM

## 2017-10-31 DIAGNOSIS — H52.7 REFRACTIVE ERROR: ICD-10-CM

## 2017-10-31 PROCEDURE — 99999 PR PBB SHADOW E&M-EST. PATIENT-LVL III: CPT | Mod: PBBFAC,,, | Performed by: OPHTHALMOLOGY

## 2017-10-31 PROCEDURE — 99024 POSTOP FOLLOW-UP VISIT: CPT | Mod: ,,, | Performed by: OPHTHALMOLOGY

## 2017-10-31 PROCEDURE — 99213 OFFICE O/P EST LOW 20 MIN: CPT | Mod: PBBFAC,PO | Performed by: OPHTHALMOLOGY

## 2017-10-31 NOTE — PATIENT INSTRUCTIONS
Continue Vigamox QID until gone. Taper prednisolone and ketorolac 3x/day for 1 week then 2x/day for 1 week then 1x/day for 1 week then stop.

## 2017-10-31 NOTE — PROGRESS NOTES
HPI     Post-op Evaluation    Additional comments: 1 wk s/p phaco iol OS 10/25           Comments   Pt states doing well, no pain, flashes or floaters.     Gtts: Prednisolone, Vigamox, Ketorolac QID OS       Last edited by Cheryle Quintana on 10/31/2017  2:58 PM. (History)            Assessment /Plan     For exam results, see Encounter Report.    Post-operative state    Nuclear sclerosis, right    Left cornea scar    History of herpes zoster    Refractive error            Posterior subcapsular age-related cataract of left eye - POD #6 s/p Phaco/PCIOL OS on 10/25/17. Doing well. Continue drops QID until Friday, then Continue Vigamox QID until gone. Taper prednisolone and ketorolac 3x/day for 1 week then 2x/day for 1 week then 1x/day for 1 week then stop.  visually significant, recommend CE OS only. Pt advised that K scars from prior zoster may still cause glare and limit visual acuity.     Nuclear sclerosis, bilateral - visually significant OS combined with PSC, OD NVS.     Left cornea scar - few small opacities central, 1.7 mm opacity temporal. See above.     History of herpes zoster - see above.     Refractive error - target emmetropia OS, toric not recommended based on K's.

## 2017-12-19 ENCOUNTER — PATIENT MESSAGE (OUTPATIENT)
Dept: OPHTHALMOLOGY | Facility: CLINIC | Age: 57
End: 2017-12-19

## 2017-12-19 ENCOUNTER — TELEPHONE (OUTPATIENT)
Dept: OPHTHALMOLOGY | Facility: CLINIC | Age: 57
End: 2017-12-19

## 2017-12-21 ENCOUNTER — OFFICE VISIT (OUTPATIENT)
Dept: OPHTHALMOLOGY | Facility: CLINIC | Age: 57
End: 2017-12-21
Payer: OTHER GOVERNMENT

## 2017-12-21 DIAGNOSIS — B02.29 POSTHERPETIC NEURALGIA: ICD-10-CM

## 2017-12-21 DIAGNOSIS — H52.7 REFRACTIVE ERROR: ICD-10-CM

## 2017-12-21 DIAGNOSIS — H57.12 EYE PAIN, LEFT: Primary | ICD-10-CM

## 2017-12-21 DIAGNOSIS — H17.9 LEFT CORNEA SCAR: ICD-10-CM

## 2017-12-21 DIAGNOSIS — Z86.19 HISTORY OF HERPES ZOSTER: ICD-10-CM

## 2017-12-21 DIAGNOSIS — H25.11 NUCLEAR SCLEROSIS, RIGHT: ICD-10-CM

## 2017-12-21 PROCEDURE — 92014 COMPRE OPH EXAM EST PT 1/>: CPT | Mod: S$PBB,,, | Performed by: OPHTHALMOLOGY

## 2017-12-21 PROCEDURE — 99999 PR PBB SHADOW E&M-EST. PATIENT-LVL II: CPT | Mod: PBBFAC,,, | Performed by: OPHTHALMOLOGY

## 2017-12-21 PROCEDURE — 99212 OFFICE O/P EST SF 10 MIN: CPT | Mod: PBBFAC,PO | Performed by: OPHTHALMOLOGY

## 2017-12-21 RX ORDER — AMITRIPTYLINE HYDROCHLORIDE 25 MG/1
25 TABLET, FILM COATED ORAL NIGHTLY PRN
Qty: 30 TABLET | Refills: 0 | Status: SHIPPED | OUTPATIENT
Start: 2017-12-21 | End: 2018-02-23

## 2017-12-21 NOTE — PROGRESS NOTES
HPI     Presenting Complaint: Pt here today for eye pain in the left eye x 4   weeks. Pt states pain level today at time of visit is 4. Pt describes pain   as throbbing and sticking pain. Pt states waking up at night from pain. Pt   has been taking ibuprofen to help with pain.  No redness or crust.     Ophthalmic medication / drops:None  (+) Pt states vision has been stable.   (-) headaches  (-) diplopia   (-) flashes / (-) floaters    Agree with above. Vision stable, just a marked increase in pain.     Last edited by Preeti Reza MD on 12/21/2017 10:19 AM.   (History)            Assessment /Plan     For exam results, see Encounter Report.    Eye pain, left    Postherpetic neuralgia    Left cornea scar    Nuclear sclerosis, right    History of herpes zoster    Refractive error    Other orders  -     amitriptyline (ELAVIL) 25 MG tablet; Take 1 tablet (25 mg total) by mouth nightly as needed for Insomnia or Pain.  Dispense: 30 tablet; Refill: 0        Eye pain, extending up forehead and scalp. Eye exam fine. Will have him try PO amitriptyline QHS, advised if it works he can have refills from PCP, otherwise f/u with Dr. Haley, as he has seen him for nerve blocks and other treatments in past. Has not had much relief with gabapentin or Lyrica.

## 2018-02-24 ENCOUNTER — LAB VISIT (OUTPATIENT)
Dept: LAB | Facility: HOSPITAL | Age: 58
End: 2018-02-24
Attending: FAMILY MEDICINE
Payer: OTHER GOVERNMENT

## 2018-02-24 DIAGNOSIS — Z13.220 SCREENING FOR HYPERLIPIDEMIA: ICD-10-CM

## 2018-02-24 DIAGNOSIS — R53.83 FATIGUE, UNSPECIFIED TYPE: ICD-10-CM

## 2018-02-24 DIAGNOSIS — Z12.5 SCREENING FOR PROSTATE CANCER: ICD-10-CM

## 2018-02-24 LAB
ALBUMIN SERPL BCP-MCNC: 3.7 G/DL
ALP SERPL-CCNC: 75 U/L
ALT SERPL W/O P-5'-P-CCNC: 31 U/L
ANION GAP SERPL CALC-SCNC: 10 MMOL/L
AST SERPL-CCNC: 23 U/L
BASOPHILS # BLD AUTO: 0.02 K/UL
BASOPHILS NFR BLD: 0.5 %
BILIRUB SERPL-MCNC: 0.5 MG/DL
BUN SERPL-MCNC: 14 MG/DL
CALCIUM SERPL-MCNC: 9.5 MG/DL
CHLORIDE SERPL-SCNC: 105 MMOL/L
CHOLEST SERPL-MCNC: 241 MG/DL
CHOLEST/HDLC SERPL: 4.6 {RATIO}
CO2 SERPL-SCNC: 25 MMOL/L
COMPLEXED PSA SERPL-MCNC: 0.32 NG/ML
CREAT SERPL-MCNC: 1.1 MG/DL
DIFFERENTIAL METHOD: NORMAL
EOSINOPHIL # BLD AUTO: 0.1 K/UL
EOSINOPHIL NFR BLD: 2.4 %
ERYTHROCYTE [DISTWIDTH] IN BLOOD BY AUTOMATED COUNT: 13.6 %
EST. GFR  (AFRICAN AMERICAN): >60 ML/MIN/1.73 M^2
EST. GFR  (NON AFRICAN AMERICAN): >60 ML/MIN/1.73 M^2
GLUCOSE SERPL-MCNC: 111 MG/DL
HCT VFR BLD AUTO: 42 %
HDLC SERPL-MCNC: 52 MG/DL
HDLC SERPL: 21.6 %
HGB BLD-MCNC: 14.3 G/DL
IMM GRANULOCYTES # BLD AUTO: 0.01 K/UL
IMM GRANULOCYTES NFR BLD AUTO: 0.2 %
LDLC SERPL CALC-MCNC: 164.4 MG/DL
LYMPHOCYTES # BLD AUTO: 1.7 K/UL
LYMPHOCYTES NFR BLD: 38.8 %
MCH RBC QN AUTO: 29.5 PG
MCHC RBC AUTO-ENTMCNC: 34 G/DL
MCV RBC AUTO: 87 FL
MONOCYTES # BLD AUTO: 0.3 K/UL
MONOCYTES NFR BLD: 7.5 %
NEUTROPHILS # BLD AUTO: 2.2 K/UL
NEUTROPHILS NFR BLD: 50.6 %
NONHDLC SERPL-MCNC: 189 MG/DL
NRBC BLD-RTO: 0 /100 WBC
PLATELET # BLD AUTO: 227 K/UL
PMV BLD AUTO: 10.5 FL
POTASSIUM SERPL-SCNC: 4.5 MMOL/L
PROT SERPL-MCNC: 7.2 G/DL
RBC # BLD AUTO: 4.85 M/UL
SODIUM SERPL-SCNC: 140 MMOL/L
TRIGL SERPL-MCNC: 123 MG/DL
TSH SERPL DL<=0.005 MIU/L-ACNC: 1.18 UIU/ML
WBC # BLD AUTO: 4.25 K/UL

## 2018-02-24 PROCEDURE — 80061 LIPID PANEL: CPT

## 2018-02-24 PROCEDURE — 36415 COLL VENOUS BLD VENIPUNCTURE: CPT | Mod: PO

## 2018-02-24 PROCEDURE — 80053 COMPREHEN METABOLIC PANEL: CPT

## 2018-02-24 PROCEDURE — 85025 COMPLETE CBC W/AUTO DIFF WBC: CPT

## 2018-02-24 PROCEDURE — 84153 ASSAY OF PSA TOTAL: CPT

## 2018-02-24 PROCEDURE — 84443 ASSAY THYROID STIM HORMONE: CPT

## 2018-02-25 PROBLEM — Z98.890 S/P UPPP (UVULOPALATOPHARYNGOPLASTY): Status: ACTIVE | Noted: 2018-02-25

## 2018-02-25 PROBLEM — G50.0 SUPRAORBITAL NEURALGIA: Status: RESOLVED | Noted: 2017-02-24 | Resolved: 2018-02-25

## 2018-04-03 PROBLEM — G47.33 OBSTRUCTIVE SLEEP APNEA SYNDROME: Status: ACTIVE | Noted: 2018-04-03

## 2018-05-09 ENCOUNTER — TELEPHONE (OUTPATIENT)
Dept: OPHTHALMOLOGY | Facility: CLINIC | Age: 58
End: 2018-05-09

## 2018-05-09 ENCOUNTER — PATIENT MESSAGE (OUTPATIENT)
Dept: OPHTHALMOLOGY | Facility: CLINIC | Age: 58
End: 2018-05-09

## 2018-05-09 NOTE — TELEPHONE ENCOUNTER
Called pt concerning eye pain. Offered him an appt today with Dr Murillo but he could not come in today. Scheduled him with Dr Reza 5/10.

## 2018-05-10 ENCOUNTER — OFFICE VISIT (OUTPATIENT)
Dept: OPHTHALMOLOGY | Facility: CLINIC | Age: 58
End: 2018-05-10
Payer: OTHER GOVERNMENT

## 2018-05-10 DIAGNOSIS — B02.29 POSTHERPETIC NEURALGIA: ICD-10-CM

## 2018-05-10 DIAGNOSIS — H02.886 MEIBOMIAN GLAND DYSFUNCTION (MGD) OF BOTH EYES: ICD-10-CM

## 2018-05-10 DIAGNOSIS — H25.11 NUCLEAR SCLEROSIS, RIGHT: ICD-10-CM

## 2018-05-10 DIAGNOSIS — Z86.19 HISTORY OF HERPES ZOSTER: ICD-10-CM

## 2018-05-10 DIAGNOSIS — H52.7 REFRACTIVE ERROR: ICD-10-CM

## 2018-05-10 DIAGNOSIS — H02.883 MEIBOMIAN GLAND DYSFUNCTION (MGD) OF BOTH EYES: ICD-10-CM

## 2018-05-10 DIAGNOSIS — H57.11 EYE PAIN, RIGHT: Primary | ICD-10-CM

## 2018-05-10 PROCEDURE — 99212 OFFICE O/P EST SF 10 MIN: CPT | Mod: PBBFAC,PO | Performed by: OPHTHALMOLOGY

## 2018-05-10 PROCEDURE — 99999 PR PBB SHADOW E&M-EST. PATIENT-LVL II: CPT | Mod: PBBFAC,,, | Performed by: OPHTHALMOLOGY

## 2018-05-10 PROCEDURE — 92012 INTRM OPH EXAM EST PATIENT: CPT | Mod: S$PBB,,, | Performed by: OPHTHALMOLOGY

## 2018-05-10 RX ORDER — NEOMYCIN SULFATE, POLYMYXIN B SULFATE, AND DEXAMETHASONE 3.5; 10000; 1 MG/G; [USP'U]/G; MG/G
OINTMENT OPHTHALMIC 2 TIMES DAILY
Qty: 3.5 G | Refills: 0 | Status: SHIPPED | OUTPATIENT
Start: 2018-05-10 | End: 2018-05-15

## 2018-05-10 NOTE — PROGRESS NOTES
HPI     Eye problem-ou    Pt complains of eye pain OU since Sunday. Pain scale 3 today OU.  Pt was   moving pine straw Sunday and feels he may have scratched eyes. OD pain   greater than OS. Went to ER Tuesday but slit lamp was not working-was   given ivett- no relief. Pt states right eye is also blurry. Hx of shingles   in the past-wanted to rule out.     Last edited by Krissy Kaplan on 5/10/2018 10:37 AM. (History)            Assessment /Plan     For exam results, see Encounter Report.    Eye pain, right    Meibomian gland dysfunction (MGD) of both eyes    Postherpetic neuralgia    Nuclear sclerosis, right    History of herpes zoster    Refractive error    Other orders  -     neomycin-polymyxin-dexamethasone (DEXACINE) 3.5 mg/g-10,000 unit/g-0.1 % Oint; Place into the right eye 2 (two) times daily. Also left eye at bedtime  Dispense: 3.5 g; Refill: 0          Floppy eyelids, uses CPAP now. Possible early hordeolum - recently working in yard. MGD - thick secretions - massaged some today. Warm compresses, lid hygiene. Handout given. Maxitrol ivett BID OD, QHS OS until improves. RTC if worsens.    Pt with chronic neuralgia-like pain OS.

## 2018-06-26 DIAGNOSIS — M25.561 RIGHT KNEE PAIN, UNSPECIFIED CHRONICITY: Primary | ICD-10-CM

## 2018-06-27 ENCOUNTER — OFFICE VISIT (OUTPATIENT)
Dept: ORTHOPEDICS | Facility: CLINIC | Age: 58
End: 2018-06-27
Payer: OTHER GOVERNMENT

## 2018-06-27 ENCOUNTER — HOSPITAL ENCOUNTER (OUTPATIENT)
Dept: RADIOLOGY | Facility: HOSPITAL | Age: 58
Discharge: HOME | End: 2018-06-27
Attending: ORTHOPAEDIC SURGERY
Payer: OTHER GOVERNMENT

## 2018-06-27 VITALS
DIASTOLIC BLOOD PRESSURE: 78 MMHG | WEIGHT: 248 LBS | BODY MASS INDEX: 31.83 KG/M2 | HEIGHT: 74 IN | HEART RATE: 93 BPM | SYSTOLIC BLOOD PRESSURE: 126 MMHG

## 2018-06-27 DIAGNOSIS — M25.561 RIGHT KNEE PAIN, UNSPECIFIED CHRONICITY: ICD-10-CM

## 2018-06-27 DIAGNOSIS — S83.241A ACUTE MEDIAL MENISCAL TEAR, RIGHT, INITIAL ENCOUNTER: Primary | ICD-10-CM

## 2018-06-27 PROCEDURE — 20610 DRAIN/INJ JOINT/BURSA W/O US: CPT | Mod: PBBFAC,PN | Performed by: ORTHOPAEDIC SURGERY

## 2018-06-27 PROCEDURE — 99999 PR PBB SHADOW E&M-EST. PATIENT-LVL III: CPT | Mod: PBBFAC,,, | Performed by: ORTHOPAEDIC SURGERY

## 2018-06-27 PROCEDURE — 99213 OFFICE O/P EST LOW 20 MIN: CPT | Mod: PBBFAC,25,PN | Performed by: ORTHOPAEDIC SURGERY

## 2018-06-27 PROCEDURE — 73562 X-RAY EXAM OF KNEE 3: CPT | Mod: TC,PO,LT

## 2018-06-27 PROCEDURE — 73564 X-RAY EXAM KNEE 4 OR MORE: CPT | Mod: 26,RT,, | Performed by: RADIOLOGY

## 2018-06-27 PROCEDURE — 73562 X-RAY EXAM OF KNEE 3: CPT | Mod: 26,XS,LT, | Performed by: RADIOLOGY

## 2018-06-27 PROCEDURE — 99203 OFFICE O/P NEW LOW 30 MIN: CPT | Mod: 25,S$PBB,, | Performed by: ORTHOPAEDIC SURGERY

## 2018-06-27 RX ORDER — TRIAMCINOLONE ACETONIDE 40 MG/ML
40 INJECTION, SUSPENSION INTRA-ARTICULAR; INTRAMUSCULAR
Status: DISCONTINUED | OUTPATIENT
Start: 2018-06-27 | End: 2018-06-27 | Stop reason: HOSPADM

## 2018-06-27 RX ADMIN — TRIAMCINOLONE ACETONIDE 40 MG: 40 INJECTION, SUSPENSION INTRA-ARTICULAR; INTRAMUSCULAR at 03:06

## 2018-06-27 NOTE — PROGRESS NOTES
Past Medical History:   Diagnosis Date    Cataract     OD    GERD (gastroesophageal reflux disease)     Hyperlipidemia     Hyperlipidemia     Shingles     involved eye    Sleep apnea     resolved with UPPP       Past Surgical History:   Procedure Laterality Date    ADENOIDECTOMY      CATARACT EXTRACTION W/  INTRAOCULAR LENS IMPLANT Left 10/25/2017    Dr Reza    nerve block      for shingles    TONSILLECTOMY      UVULOPALATOPHARYNGOPLASTY         Current Outpatient Prescriptions   Medication Sig    ibuprofen (ADVIL,MOTRIN) 800 MG tablet Take 1 tablet (800 mg total) by mouth 3 (three) times daily.    multivitamin (ONE DAILY MULTIVITAMIN) per tablet Take 1 tablet by mouth once daily.    simvastatin (ZOCOR) 40 MG tablet Take 1 tablet (40 mg total) by mouth every evening.     No current facility-administered medications for this visit.        Review of patient's allergies indicates:   Allergen Reactions    No known allergies        Family History   Problem Relation Age of Onset    Hypertension Father     Autoimmune disease Brother     No Known Problems Daughter     No Known Problems Daughter     No Known Problems Daughter     No Known Problems Daughter     Cancer Maternal Grandmother     Heart attack Paternal Grandfather     Amblyopia Neg Hx     Blindness Neg Hx     Cataracts Neg Hx     Glaucoma Neg Hx     Diabetes Neg Hx     Macular degeneration Neg Hx     Retinal detachment Neg Hx     Strabismus Neg Hx     Stroke Neg Hx     Thyroid disease Neg Hx        Social History     Social History    Marital status:      Spouse name: N/A    Number of children: 4    Years of education: N/A     Occupational History    Not on file.     Social History Main Topics    Smoking status: Never Smoker    Smokeless tobacco: Never Used    Alcohol use 1.8 oz/week     3 Cans of beer per week      Comment: weekend    Drug use: No    Sexual activity: Yes     Partners: Female     Other Topics  Concern    Not on file     Social History Narrative    No narrative on file       Chief Complaint:   Chief Complaint   Patient presents with    Right Knee - Pain       History of present illness:  This is a 57-year-old male seen for right medial knee pain.  Patient states that his knee started hurting in early June.  Denies any acute injury or trauma.  Pain with prolonged walking.  Requires him to use crutches at times.  Pain over the medial joint line. Denies mechanical symptoms.  Denies lot of swelling.  Pain is a 9/10.  No prior treatment.      Review of Systems:    Constitution: Negative for chills, fever, and sweats.  Negative for unexplained weight loss.    HENT:  Negative for headaches and blurry vision.    Cardiovascular:Negative for chest pain or irregular heart beat. Negative for hypertension.    Respiratory:  Negative for cough and shortness of breath.    Gastrointestinal: Negative for abdominal pain, heartburn, melena, nausea, and vomitting.    Genitourinary:  Negative bladder incontinence and dysuria.    Musculoskeletal:  See HPI    Neurological: Negative for numbness.    Psychiatric/Behavioral: Negative for depression.  The patient is not nervous/anxious.      Endocrine: Negative for polyuria    Hematologic/Lymphatic: Negative for bleeding problem.  Does not bruise/bleed easily.    Skin: Negative for poor would healing and rash      Physical Examination:    Vital Signs:    Vitals:    06/27/18 1428   BP: 126/78   Pulse: 93       Body mass index is 31.84 kg/m².    This a well-developed, well nourished patient in no acute distress.  They are alert and oriented and cooperative to examination.  Pt. walks without an antalgic gait.      Examination of the right knee shows no rashes or erythema. There are no masses ecchymosis or effusion. Patient has full range of motion from 0-130°. Patient is nontender to palpation over lateral joint line and markedly tender to palpation over the medial joint line.  Patient has a - Lachman exam, - anterior drawer exam, and - posterior drawer exam. - James's exam. Knee is stable to varus and valgus stress. 5 out of 5 motor strength. Palpable distal pulses. Intact light touch sensation. Negative Patellofemoral crepitus    Examination of the left knee shows no rashes or erythema. There are no masses ecchymosis or effusion. Patient has full range of motion from 0-130°. Patient is nontender to palpation over lateral joint line and nontender to palpation over the medial joint line. Patient has a - Lachman exam, - anterior drawer exam, and - posterior drawer exam. - James's exam. Knee is stable to varus and valgus stress. 5 out of 5 motor strength. Palpable distal pulses. Intact light touch sensation. Negative Patellofemoral crepitus        X-rays:  X-rays of the right knee are ordered and reviewed which show well-maintained joint space     Assessment::  Right medial meniscal tear    Plan:  I reviewed the findings with him today.  We talked about possibilities of meniscal tear.  Patient elected for cortisone injection.  Talked about possibly getting an MRI if still symptomatic.    This note was created using [employer] voice recognition software that occasionally misinterpreted phrases or words.    Consult note is delivered via Epic messaging service.

## 2018-06-27 NOTE — PROCEDURES
Large Joint Aspiration/Injection  Date/Time: 6/27/2018 3:34 PM  Performed by: ARIADNA SCHWARTZ  Authorized by: ARIADNA SCHWARTZ     Consent Done?:  Yes (Verbal)  Indications:  Pain  Procedure site marked: Yes    Timeout: Prior to procedure the correct patient, procedure, and site was verified      Location:  Knee  Site:  R knee  Prep: Patient was prepped and draped in usual sterile fashion    Needle size:  20 G  Approach:  Anterolateral  Medications:  40 mg triamcinolone acetonide 40 mg/mL  Patient tolerance:  Patient tolerated the procedure well with no immediate complications

## 2018-06-27 NOTE — LETTER
June 27, 2018      Fidelia Lin MD  [address] [place] [address]           [facility] [address] [facility] [address] [place] [address]  Phone: [phone]          Patient: Samir Muñoz   MR Number: [id number]   YOB: 1960   Date of Visit: 6/27/2018       Dear Dr. Fidelia Lin:    Thank you for referring Samir Muñoz to me for evaluation. Attached you will find relevant portions of my assessment and plan of care.    If you have questions, please do not hesitate to call me. I look forward to following Samir Muñoz along with you.    Sincerely,    Luigi Khoury MD    Enclosure  CC:  No Recipients    If you would like to receive this communication electronically, please contact [email or web address] or [phone] to request more information on [employer] Link access.    For providers and/or their staff who would like to refer a patient to [facility], please contact us through our one-stop-shop provider referral line, [facility], at [phone].    If you feel you have received this communication in error or would no longer like to receive these types of communications, please e-mail [email or web address]

## 2019-02-01 ENCOUNTER — PATIENT OUTREACH (OUTPATIENT)
Dept: ADMINISTRATIVE | Facility: HOSPITAL | Age: 59
End: 2019-02-01

## 2019-02-15 ENCOUNTER — OFFICE VISIT (OUTPATIENT)
Dept: FAMILY MEDICINE | Facility: CLINIC | Age: 59
End: 2019-02-15
Payer: OTHER GOVERNMENT

## 2019-02-15 VITALS
RESPIRATION RATE: 18 BRPM | SYSTOLIC BLOOD PRESSURE: 112 MMHG | HEART RATE: 72 BPM | WEIGHT: 253.75 LBS | HEIGHT: 74 IN | BODY MASS INDEX: 32.57 KG/M2 | DIASTOLIC BLOOD PRESSURE: 68 MMHG

## 2019-02-15 DIAGNOSIS — Z00.00 WELLNESS EXAMINATION: Primary | ICD-10-CM

## 2019-02-15 DIAGNOSIS — K21.9 GASTROESOPHAGEAL REFLUX DISEASE, ESOPHAGITIS PRESENCE NOT SPECIFIED: ICD-10-CM

## 2019-02-15 DIAGNOSIS — E78.5 HYPERLIPIDEMIA, UNSPECIFIED HYPERLIPIDEMIA TYPE: ICD-10-CM

## 2019-02-15 DIAGNOSIS — B02.29 POST HERPETIC NEURALGIA: ICD-10-CM

## 2019-02-15 PROBLEM — Z01.00 ENCOUNTER FOR EYE EXAM: Status: RESOLVED | Noted: 2017-10-25 | Resolved: 2019-02-15

## 2019-02-15 PROCEDURE — 99999 PR PBB SHADOW E&M-EST. PATIENT-LVL III: ICD-10-PCS | Mod: PBBFAC,,, | Performed by: FAMILY MEDICINE

## 2019-02-15 PROCEDURE — 99396 PR PREVENTIVE VISIT,EST,40-64: ICD-10-PCS | Mod: S$PBB,,, | Performed by: FAMILY MEDICINE

## 2019-02-15 PROCEDURE — 99999 PR PBB SHADOW E&M-EST. PATIENT-LVL III: CPT | Mod: PBBFAC,,, | Performed by: FAMILY MEDICINE

## 2019-02-15 PROCEDURE — 99396 PREV VISIT EST AGE 40-64: CPT | Mod: S$PBB,,, | Performed by: FAMILY MEDICINE

## 2019-02-15 PROCEDURE — 99213 OFFICE O/P EST LOW 20 MIN: CPT | Mod: PBBFAC,PO | Performed by: FAMILY MEDICINE

## 2019-02-15 RX ORDER — PANTOPRAZOLE SODIUM 40 MG/1
40 TABLET, DELAYED RELEASE ORAL DAILY
Qty: 30 TABLET | Refills: 5 | Status: SHIPPED | OUTPATIENT
Start: 2019-02-15 | End: 2019-05-04

## 2019-02-15 RX ORDER — FLUTICASONE PROPIONATE 50 MCG
SPRAY, SUSPENSION (ML) NASAL
COMMUNITY
Start: 2019-01-22 | End: 2022-01-05

## 2019-02-15 RX ORDER — PREGABALIN 25 MG/1
25 CAPSULE ORAL 2 TIMES DAILY
Qty: 60 CAPSULE | Refills: 2 | Status: SHIPPED | OUTPATIENT
Start: 2019-02-15 | End: 2019-05-04

## 2019-02-15 NOTE — PROGRESS NOTES
"Subjective:       Patient ID: Samir Muñoz is a 58 y.o. male.    Chief Complaint: Annual Exam (Patient here for annual appointment); Chest Pain (Patient reports tightness/pressure in chest during high stree situations. Never during physical exhertion); and Pain (Patient reports ongoing problems with post perpetic pain)    Here for wellness. Still with post herpetic neuralgia and willing to try meds at this point.  Due for labs. Doing well overall.  Describes worsening gerd regarding "chest pain". No dyspnea or worsening with activity.      Review of Systems   Constitutional: Negative for chills, fatigue and fever.   Respiratory: Negative for cough, chest tightness and shortness of breath.    Cardiovascular: Negative for chest pain, palpitations and leg swelling.   Endocrine: Negative for cold intolerance and heat intolerance.   Skin: Negative for rash.   Psychiatric/Behavioral: Negative for dysphoric mood. The patient is not nervous/anxious.        Objective:      Physical Exam   Constitutional: He appears well-developed and well-nourished.   HENT:   Head: Normocephalic and atraumatic.   Cardiovascular: Normal rate, regular rhythm and normal heart sounds.   Pulmonary/Chest: Effort normal and breath sounds normal.   Psychiatric: He has a normal mood and affect.   Nursing note and vitals reviewed.      Assessment:       1. Wellness examination    2. Post herpetic neuralgia    3. Hyperlipidemia, unspecified hyperlipidemia type    4. Gastroesophageal reflux disease, esophagitis presence not specified        Plan:       Wellness examination  -     CBC auto differential; Future; Expected date: 02/15/2019  -     Comprehensive metabolic panel; Future; Expected date: 02/15/2019  -     Lipid panel; Future; Expected date: 02/15/2019  -     TSH; Future; Expected date: 02/15/2019  -     PSA, Screening; Future; Expected date: 02/15/2019    Post herpetic neuralgia    Hyperlipidemia, unspecified hyperlipidemia " type    Gastroesophageal reflux disease, esophagitis presence not specified  -     pantoprazole (PROTONIX) 40 MG tablet; Take 1 tablet (40 mg total) by mouth once daily.  Dispense: 30 tablet; Refill: 5    Other orders  -     pregabalin (LYRICA) 25 MG capsule; Take 1 capsule (25 mg total) by mouth 2 (two) times daily.  Dispense: 60 capsule; Refill: 2      Update labs and health maintenance.  To ED true or any changing chest pain but this does not sound cardiac at this point.  F/u with Dr. Lin as planned or with myself.  Follow-up in about 6 months (around 8/15/2019), or if symptoms worsen or fail to improve.

## 2019-02-18 ENCOUNTER — LAB VISIT (OUTPATIENT)
Dept: LAB | Facility: HOSPITAL | Age: 59
End: 2019-02-18
Attending: FAMILY MEDICINE
Payer: OTHER GOVERNMENT

## 2019-02-18 DIAGNOSIS — Z00.00 WELLNESS EXAMINATION: ICD-10-CM

## 2019-02-18 LAB
ALBUMIN SERPL BCP-MCNC: 4.1 G/DL
ALP SERPL-CCNC: 82 U/L
ALT SERPL W/O P-5'-P-CCNC: 30 U/L
ANION GAP SERPL CALC-SCNC: 9 MMOL/L
AST SERPL-CCNC: 24 U/L
BASOPHILS # BLD AUTO: 0.03 K/UL
BASOPHILS NFR BLD: 0.7 %
BILIRUB SERPL-MCNC: 0.8 MG/DL
BUN SERPL-MCNC: 14 MG/DL
CALCIUM SERPL-MCNC: 10.1 MG/DL
CHLORIDE SERPL-SCNC: 102 MMOL/L
CHOLEST SERPL-MCNC: 251 MG/DL
CHOLEST/HDLC SERPL: 4.7 {RATIO}
CO2 SERPL-SCNC: 29 MMOL/L
COMPLEXED PSA SERPL-MCNC: 0.35 NG/ML
CREAT SERPL-MCNC: 1.2 MG/DL
DIFFERENTIAL METHOD: NORMAL
EOSINOPHIL # BLD AUTO: 0.1 K/UL
EOSINOPHIL NFR BLD: 3 %
ERYTHROCYTE [DISTWIDTH] IN BLOOD BY AUTOMATED COUNT: 13.5 %
EST. GFR  (AFRICAN AMERICAN): >60 ML/MIN/1.73 M^2
EST. GFR  (NON AFRICAN AMERICAN): >60 ML/MIN/1.73 M^2
GLUCOSE SERPL-MCNC: 90 MG/DL
HCT VFR BLD AUTO: 46.7 %
HDLC SERPL-MCNC: 53 MG/DL
HDLC SERPL: 21.1 %
HGB BLD-MCNC: 15.8 G/DL
IMM GRANULOCYTES # BLD AUTO: 0.01 K/UL
IMM GRANULOCYTES NFR BLD AUTO: 0.2 %
LDLC SERPL CALC-MCNC: 143.8 MG/DL
LYMPHOCYTES # BLD AUTO: 1.5 K/UL
LYMPHOCYTES NFR BLD: 35.2 %
MCH RBC QN AUTO: 29.5 PG
MCHC RBC AUTO-ENTMCNC: 33.8 G/DL
MCV RBC AUTO: 87 FL
MONOCYTES # BLD AUTO: 0.4 K/UL
MONOCYTES NFR BLD: 8.9 %
NEUTROPHILS # BLD AUTO: 2.3 K/UL
NEUTROPHILS NFR BLD: 52 %
NONHDLC SERPL-MCNC: 198 MG/DL
NRBC BLD-RTO: 0 /100 WBC
PLATELET # BLD AUTO: 208 K/UL
PMV BLD AUTO: 10.6 FL
POTASSIUM SERPL-SCNC: 4.1 MMOL/L
PROT SERPL-MCNC: 7.5 G/DL
RBC # BLD AUTO: 5.36 M/UL
SODIUM SERPL-SCNC: 140 MMOL/L
TRIGL SERPL-MCNC: 271 MG/DL
TSH SERPL DL<=0.005 MIU/L-ACNC: 1.56 UIU/ML
WBC # BLD AUTO: 4.38 K/UL

## 2019-02-18 PROCEDURE — 36415 COLL VENOUS BLD VENIPUNCTURE: CPT | Mod: PN

## 2019-02-18 PROCEDURE — 84443 ASSAY THYROID STIM HORMONE: CPT

## 2019-02-18 PROCEDURE — 85025 COMPLETE CBC W/AUTO DIFF WBC: CPT

## 2019-02-18 PROCEDURE — 84153 ASSAY OF PSA TOTAL: CPT

## 2019-02-18 PROCEDURE — 80061 LIPID PANEL: CPT

## 2019-02-18 PROCEDURE — 80053 COMPREHEN METABOLIC PANEL: CPT

## 2019-02-19 ENCOUNTER — PATIENT MESSAGE (OUTPATIENT)
Dept: FAMILY MEDICINE | Facility: CLINIC | Age: 59
End: 2019-02-19

## 2019-02-20 ENCOUNTER — DOCUMENTATION ONLY (OUTPATIENT)
Dept: FAMILY MEDICINE | Facility: CLINIC | Age: 59
End: 2019-02-20

## 2019-05-06 ENCOUNTER — PATIENT MESSAGE (OUTPATIENT)
Dept: FAMILY MEDICINE | Facility: CLINIC | Age: 59
End: 2019-05-06

## 2019-05-06 DIAGNOSIS — S86.012D RUPTURE OF LEFT ACHILLES TENDON, SUBSEQUENT ENCOUNTER: Primary | ICD-10-CM

## 2019-05-07 ENCOUNTER — TELEPHONE (OUTPATIENT)
Dept: FAMILY MEDICINE | Facility: CLINIC | Age: 59
End: 2019-05-07

## 2019-05-07 ENCOUNTER — PATIENT MESSAGE (OUTPATIENT)
Dept: FAMILY MEDICINE | Facility: CLINIC | Age: 59
End: 2019-05-07

## 2019-05-07 NOTE — TELEPHONE ENCOUNTER
Printed referral to Dr Vernon's office and filed in faxed file folder. My Chart reply sent ot pt as well. CLC

## 2019-05-07 NOTE — TELEPHONE ENCOUNTER
----- Message from Sultana Burrellnoel sent at 5/7/2019  1:55 PM CDT -----  Contact: Zoya with Dr Bill Office  Dr Black referred the patient to Dr Bill yesterday for an emergency but the patient needs a  Authorization for his insurance to pay.  Please get the authorization approved and sent over to fax [phone] or call Zoya if you have any questions at [phone]. Thanks

## 2019-05-08 ENCOUNTER — PATIENT MESSAGE (OUTPATIENT)
Dept: FAMILY MEDICINE | Facility: CLINIC | Age: 59
End: 2019-05-08

## 2019-05-08 DIAGNOSIS — M25.572 LEFT ANKLE PAIN, UNSPECIFIED CHRONICITY: Primary | ICD-10-CM

## 2019-05-20 ENCOUNTER — OFFICE VISIT (OUTPATIENT)
Dept: ORTHOPEDICS | Facility: CLINIC | Age: 59
End: 2019-05-20
Payer: OTHER GOVERNMENT

## 2019-05-20 ENCOUNTER — HOSPITAL ENCOUNTER (OUTPATIENT)
Dept: RADIOLOGY | Facility: HOSPITAL | Age: 59
Discharge: HOME | End: 2019-05-20
Attending: ORTHOPAEDIC SURGERY
Payer: OTHER GOVERNMENT

## 2019-05-20 ENCOUNTER — PATIENT MESSAGE (OUTPATIENT)
Dept: FAMILY MEDICINE | Facility: CLINIC | Age: 59
End: 2019-05-20

## 2019-05-20 VITALS — WEIGHT: 250 LBS | BODY MASS INDEX: 32.08 KG/M2 | HEIGHT: 74 IN

## 2019-05-20 DIAGNOSIS — M25.572 ACUTE LEFT ANKLE PAIN: ICD-10-CM

## 2019-05-20 DIAGNOSIS — S99.912A INJURY OF LEFT ANKLE, INITIAL ENCOUNTER: ICD-10-CM

## 2019-05-20 DIAGNOSIS — S99.912A INJURY OF LEFT ANKLE, INITIAL ENCOUNTER: Primary | ICD-10-CM

## 2019-05-20 DIAGNOSIS — S86.012A ACHILLES TENDON RUPTURE, LEFT, INITIAL ENCOUNTER: ICD-10-CM

## 2019-05-20 DIAGNOSIS — Z71.2 ENCOUNTER TO DISCUSS TEST RESULTS: ICD-10-CM

## 2019-05-20 PROCEDURE — 73610 XR ANKLE COMPLETE 3 VIEW LEFT: ICD-10-PCS | Mod: 26,LT,, | Performed by: RADIOLOGY

## 2019-05-20 PROCEDURE — 99999 PR PBB SHADOW E&M-EST. PATIENT-LVL III: CPT | Mod: PBBFAC,,, | Performed by: ORTHOPAEDIC SURGERY

## 2019-05-20 PROCEDURE — 97760 ORTHOTIC MGMT&TRAING 1ST ENC: CPT | Mod: GP,,, | Performed by: ORTHOPAEDIC SURGERY

## 2019-05-20 PROCEDURE — 73630 XR FOOT COMPLETE 3 VIEW LEFT: ICD-10-PCS | Mod: 26,LT,, | Performed by: RADIOLOGY

## 2019-05-20 PROCEDURE — 73630 X-RAY EXAM OF FOOT: CPT | Mod: TC,PO,LT

## 2019-05-20 PROCEDURE — 99213 PR OFFICE/OUTPT VISIT, EST, LEVL III, 20-29 MIN: ICD-10-PCS | Mod: 25,S$PBB,, | Performed by: ORTHOPAEDIC SURGERY

## 2019-05-20 PROCEDURE — 99213 OFFICE O/P EST LOW 20 MIN: CPT | Mod: PBBFAC,25,PN | Performed by: ORTHOPAEDIC SURGERY

## 2019-05-20 PROCEDURE — 73630 X-RAY EXAM OF FOOT: CPT | Mod: 26,LT,, | Performed by: RADIOLOGY

## 2019-05-20 PROCEDURE — 99213 OFFICE O/P EST LOW 20 MIN: CPT | Mod: 25,S$PBB,, | Performed by: ORTHOPAEDIC SURGERY

## 2019-05-20 PROCEDURE — 73610 X-RAY EXAM OF ANKLE: CPT | Mod: 26,LT,, | Performed by: RADIOLOGY

## 2019-05-20 PROCEDURE — 99999 PR PBB SHADOW E&M-EST. PATIENT-LVL III: ICD-10-PCS | Mod: PBBFAC,,, | Performed by: ORTHOPAEDIC SURGERY

## 2019-05-20 PROCEDURE — 73610 X-RAY EXAM OF ANKLE: CPT | Mod: TC,PO,LT

## 2019-05-20 PROCEDURE — 97760 PR ORTHOTIC MGMT&TRAINJ INITIAL ENC EA 15 MINS: ICD-10-PCS | Mod: GP,,, | Performed by: ORTHOPAEDIC SURGERY

## 2019-05-20 NOTE — PROGRESS NOTES
A 58 years old sustained an injury to his left ankle two weeks ago, placed into   a cast for an Achilles tendon rupture, which was confirmed by MRI at an outlying   facility.    Exam today shows he has palpable defect at the Achilles tendon.  He is able to   weakly flex and extend the ankle.  Compartments are soft.  No signs of   infection.    ASSESSMENT:  Achilles tendon rupture.    PLAN:  He is two weeks out.  I am going to put him into a boot with a left.  We   will have him come back in a few weeks' time as an established patient and   progress his weightbearing.  He has been also working on gentle daily range of   motion in the meantime.      PBB/HN  dd: 05/20/2019 14:22:36 (CDT)  td: 05/21/2019 02:35:36 (CDT)  Doc ID   #[id number]  Job ID #[id number]    CC:     Further History  Aching pain  Worse with activity  Relieved with rest  No other associated symptoms  No other radiation    Further Exam  Alert and oriented  Pleasant  Contralateral limb has appropriate range of motion for age and condition  Contralateral limb has appropriate strength for age and condition  Contralateral limb has appropriate stability  for age and condition  No adenopathy  Pulses are appropriate for current condition  Skin is intact        Chief Complaint    Chief Complaint   Patient presents with    Left Ankle - Pain, Injury    Ankle Injury     left ankle achilles tendon 5/20/19       [facility]  Samir Muñoz is a 58 y.o.  male who presents with       Past Medical History  Past Medical History:   Diagnosis Date    Cataract     OD    GERD (gastroesophageal reflux disease)     Hyperlipidemia     Hyperlipidemia     Shingles     involved eye    Sleep apnea     resolved with UPPP       Past Surgical History  Past Surgical History:   Procedure Laterality Date    ADENOIDECTOMY      BLOCK-NERVE Left 5/9/2016    [facility]    CATARACT EXTRACTION W/  INTRAOCULAR LENS IMPLANT Left 10/25/2017    Dr Reza     nerve block      for shingles    phacoemulsification of cataract with intraocular lens implant left eye Left 10/25/2017    [facility] OR    RADIOFREQUENCY THERMOCOAGULATION supraorbital nerve Left 2/24/2017    [facility]un [facility]rain [facility] at [facility] OR    TONSILLECTOMY      UVULOPALATOPHARYNGOPLASTY         Medications  Current Outpatient Medications   Medication Sig    fluticasone (FLONASE) 50 mcg/actuation nasal spray     multivitamin (ONE DAILY MULTIVITAMIN) per tablet Take 1 tablet by mouth once daily.    naproxen (NAPROSYN) 500 MG tablet Take 1 tablet (500 mg total) by mouth 2 (two) times daily with meals.     No current facility-administered medications for this visit.        Allergies  Review of patient's allergies indicates:   Allergen Reactions    No known allergies        Family History  Family History   Problem Relation Age of Onset    Hypertension Father     Autoimmune disease Brother     No Known Problems Daughter     No Known Problems Daughter     No Known Problems Daughter     No Known Problems Daughter     Cancer Maternal Grandmother     Heart attack Paternal Grandfather     Amblyopia Neg Hx     Blindness Neg Hx     Cataracts Neg Hx     Glaucoma Neg Hx     Diabetes Neg Hx     Macular degeneration Neg Hx     Retinal detachment Neg Hx     Strabismus Neg Hx     Stroke Neg Hx     Thyroid disease Neg Hx        Social History  Social History     Socioeconomic History    Marital status:      Spouse name: Not on file    Number of children: 4    Years of education: Not on file    Highest education level: Not on file   Occupational History    Not on file   Social Needs    Financial resource strain: Not on file    Food insecurity:     Worry: Not on file     Inability: Not on file    Transportation needs:     Medical: Not on file     Non-medical: Not on file   Tobacco Use    Smoking status: Never Smoker    Smokeless tobacco:  Never Used   Substance and Sexual Activity    Alcohol use: Not on file     Comment: weekend    Drug use: No    Sexual activity: Yes     Partners: Female   Lifestyle    Physical activity:     Days per week: Not on file     Minutes per session: Not on file    Stress: Not on file   Relationships    Social connections:     Talks on phone: Not on file     Gets together: Not on file     Attends [employer] service: Not on file     Active member of club or organization: Not on file     Attends meetings of clubs or organizations: Not on file     Relationship status: Not on file   Other Topics Concern    Not on file   Social History Narrative    Not on file               Review of Systems     Constitutional: Negative    HENT: Negative  Eyes: Negative  Respiratory: Negative  Cardiovascular: Negative  Musculoskeletal: HPI  Skin: Negative  Neurological: Negative  Hematological: Negative  Endocrine: Negative                 Physical Exam    There were no vitals filed for this visit.  Body mass index is 32.1 kg/m².  Physical Examination:     General appearance -  well appearing, and in no distress  Mental status - awake  Neck - supple  Chest -  symmetric air entry  Heart - normal rate   Abdomen - soft      Assessment     1. Injury of left ankle, initial encounter    2. Acute left ankle pain    3. Achilles tendon rupture, left, initial encounter    4. Encounter to discuss test results          PlanWe performed a custom orthotic/brace fitting, adjusting and training with the patient. The patient demonstrated understanding and proper care. This was performed for 15 minutes.

## 2019-05-20 NOTE — Clinical Note
May 20, 2019      HOLDEN Black MD  [address] [facility] [address] [place] [address]           [facility]  [address] [place] [address]  Phone: [phone]          Patient: Samir Muñoz   MR Number: [id number]   YOB: 1960   Date of Visit: 5/20/2019       Dear Dr. HOLDEN Black:    Thank you for referring Samir Muñoz to me for evaluation. Attached you will find relevant portions of my assessment and plan of care.    If you have questions, please do not hesitate to call me. I look forward to following Samir Muñoz along with you.    Sincerely,    Azar Michelle MD    Enclosure  CC:  No Recipients    If you would like to receive this communication electronically, please contact [email or web address] or [phone] to request more information on [employer] Link access.    For providers and/or their staff who would like to refer a patient to [facility], please contact us through our one-stop-shop provider referral line, [facility] , at [phone].    If you feel you have received this communication in error or would no longer like to receive these types of communications, please e-mail [email or web address]

## 2019-05-29 DIAGNOSIS — M25.562 ACUTE PAIN OF LEFT KNEE: Primary | ICD-10-CM

## 2019-05-30 ENCOUNTER — OFFICE VISIT (OUTPATIENT)
Dept: ORTHOPEDICS | Facility: CLINIC | Age: 59
End: 2019-05-30
Payer: OTHER GOVERNMENT

## 2019-05-30 ENCOUNTER — HOSPITAL ENCOUNTER (OUTPATIENT)
Dept: RADIOLOGY | Facility: HOSPITAL | Age: 59
Discharge: HOME | End: 2019-05-30
Attending: ORTHOPAEDIC SURGERY
Payer: OTHER GOVERNMENT

## 2019-05-30 VITALS — WEIGHT: 250 LBS | BODY MASS INDEX: 32.08 KG/M2 | HEIGHT: 74 IN

## 2019-05-30 DIAGNOSIS — M25.561 ACUTE PAIN OF RIGHT KNEE: Primary | ICD-10-CM

## 2019-05-30 DIAGNOSIS — M25.561 ACUTE PAIN OF RIGHT KNEE: ICD-10-CM

## 2019-05-30 DIAGNOSIS — M25.562 ACUTE PAIN OF LEFT KNEE: ICD-10-CM

## 2019-05-30 DIAGNOSIS — M17.11 PRIMARY OSTEOARTHRITIS OF RIGHT KNEE: ICD-10-CM

## 2019-05-30 PROCEDURE — 99999 PR PBB SHADOW E&M-EST. PATIENT-LVL II: ICD-10-PCS | Mod: PBBFAC,,, | Performed by: ORTHOPAEDIC SURGERY

## 2019-05-30 PROCEDURE — 73562 XR KNEE ORTHO RIGHT: ICD-10-PCS | Mod: 26,RT,, | Performed by: RADIOLOGY

## 2019-05-30 PROCEDURE — 73560 XR KNEE ORTHO RIGHT: ICD-10-PCS | Mod: 26,59,RT, | Performed by: RADIOLOGY

## 2019-05-30 PROCEDURE — 99212 OFFICE O/P EST SF 10 MIN: CPT | Mod: PBBFAC,25,PN | Performed by: ORTHOPAEDIC SURGERY

## 2019-05-30 PROCEDURE — 99999 PR PBB SHADOW E&M-EST. PATIENT-LVL II: CPT | Mod: PBBFAC,,, | Performed by: ORTHOPAEDIC SURGERY

## 2019-05-30 PROCEDURE — 99213 PR OFFICE/OUTPT VISIT, EST, LEVL III, 20-29 MIN: ICD-10-PCS | Mod: S$PBB,,, | Performed by: ORTHOPAEDIC SURGERY

## 2019-05-30 PROCEDURE — 73562 X-RAY EXAM OF KNEE 3: CPT | Mod: 26,RT,, | Performed by: RADIOLOGY

## 2019-05-30 PROCEDURE — 99213 OFFICE O/P EST LOW 20 MIN: CPT | Mod: S$PBB,,, | Performed by: ORTHOPAEDIC SURGERY

## 2019-05-30 PROCEDURE — 73560 X-RAY EXAM OF KNEE 1 OR 2: CPT | Mod: TC,PO,RT

## 2019-05-30 PROCEDURE — 73560 X-RAY EXAM OF KNEE 1 OR 2: CPT | Mod: 26,59,RT, | Performed by: RADIOLOGY

## 2019-05-31 NOTE — PROGRESS NOTES
[age] years old, three and a half weeks out from Achilles tendon rupture,   doing well, minimal pain, does complain of pain in his right knee.  He is able   to gently flex and extend the left ankle.  No signs of infection.    ASSESSMENT:  Achilles tendon rupture, healing three and a half weeks out.    PLAN:  Progress to partial weightbearing, implement gentle range of motion to   the ankle.  Follow up in one month's time as an established patient.  We are   going to hold off on Kenalog injection to the right knee today.      NATHANAEL/JOSÉ MIGUEL  dd: 05/30/2019 14:51:13 (CDT)  td: 05/31/2019 02:23:47 (CDT)  Doc ID   #[id number]  Job ID #[id number]    CC:

## 2019-06-14 ENCOUNTER — HOSPITAL ENCOUNTER (OUTPATIENT)
Dept: RADIOLOGY | Facility: HOSPITAL | Age: 59
Discharge: HOME | End: 2019-06-14
Attending: ORTHOPAEDIC SURGERY
Payer: OTHER GOVERNMENT

## 2019-06-14 ENCOUNTER — OFFICE VISIT (OUTPATIENT)
Dept: ORTHOPEDICS | Facility: CLINIC | Age: 59
End: 2019-06-14
Payer: OTHER GOVERNMENT

## 2019-06-14 VITALS — WEIGHT: 250 LBS | BODY MASS INDEX: 32.08 KG/M2 | HEIGHT: 74 IN

## 2019-06-14 DIAGNOSIS — M25.572 ACUTE LEFT ANKLE PAIN: ICD-10-CM

## 2019-06-14 DIAGNOSIS — S86.012D ACHILLES TENDON RUPTURE, LEFT, SUBSEQUENT ENCOUNTER: ICD-10-CM

## 2019-06-14 DIAGNOSIS — S86.012D ACHILLES TENDON RUPTURE, LEFT, SUBSEQUENT ENCOUNTER: Primary | ICD-10-CM

## 2019-06-14 PROCEDURE — 99213 OFFICE O/P EST LOW 20 MIN: CPT | Mod: S$PBB,,, | Performed by: ORTHOPAEDIC SURGERY

## 2019-06-14 PROCEDURE — 93971 EXTREMITY STUDY: CPT | Mod: 26,LT,, | Performed by: RADIOLOGY

## 2019-06-14 PROCEDURE — 99999 PR PBB SHADOW E&M-EST. PATIENT-LVL III: CPT | Mod: PBBFAC,,, | Performed by: ORTHOPAEDIC SURGERY

## 2019-06-14 PROCEDURE — 93971 US LOWER EXTREMITY VEINS LEFT: ICD-10-PCS | Mod: 26,LT,, | Performed by: RADIOLOGY

## 2019-06-14 PROCEDURE — 93971 EXTREMITY STUDY: CPT | Mod: TC,PO,LT

## 2019-06-14 PROCEDURE — 99213 OFFICE O/P EST LOW 20 MIN: CPT | Mod: PBBFAC,PN,25 | Performed by: ORTHOPAEDIC SURGERY

## 2019-06-14 PROCEDURE — 99999 PR PBB SHADOW E&M-EST. PATIENT-LVL III: ICD-10-PCS | Mod: PBBFAC,,, | Performed by: ORTHOPAEDIC SURGERY

## 2019-06-14 PROCEDURE — 99213 PR OFFICE/OUTPT VISIT, EST, LEVL III, 20-29 MIN: ICD-10-PCS | Mod: S$PBB,,, | Performed by: ORTHOPAEDIC SURGERY

## 2019-06-14 NOTE — PROGRESS NOTES
HISTORY OF PRESENT ILLNESS:  58 years old, six weeks out from Achilles injury.    He is doing better.  He has actually been walking without the boot occasionally,   still has some swelling in his leg and is concerned about this.    ASSESSMENT:  Achilles injury six weeks out.    PLAN:  We will go ahead and order an ultrasound to rule out DVT.  We will get   him set up with therapy.  Wean use of boot, gentle strengthening exercise.    Follow up in six weeks' time as an established patient.        NATHANAEL/JOSÉ MIGUEL  dd: 06/14/2019 09:40:01 (CDT)  td: 06/14/2019 22:54:35 (CDT)  Doc ID   #[id number]  Job ID #[id number]    CC:

## 2019-06-24 ENCOUNTER — PATIENT MESSAGE (OUTPATIENT)
Dept: ORTHOPEDICS | Facility: CLINIC | Age: 59
End: 2019-06-24

## 2019-06-25 ENCOUNTER — CLINICAL SUPPORT (OUTPATIENT)
Dept: REHABILITATION | Facility: HOSPITAL | Age: 59
End: 2019-06-25
Attending: ORTHOPAEDIC SURGERY
Payer: OTHER GOVERNMENT

## 2019-06-25 DIAGNOSIS — M25.672 DECREASED RANGE OF MOTION OF LEFT ANKLE: ICD-10-CM

## 2019-06-25 DIAGNOSIS — R26.2 DIFFICULTY WALKING: ICD-10-CM

## 2019-06-25 DIAGNOSIS — M25.572 CHRONIC PAIN OF LEFT ANKLE: Primary | ICD-10-CM

## 2019-06-25 DIAGNOSIS — G89.29 CHRONIC PAIN OF LEFT ANKLE: Primary | ICD-10-CM

## 2019-06-25 DIAGNOSIS — M62.81 MUSCLE WEAKNESS: ICD-10-CM

## 2019-06-25 PROCEDURE — 97110 THERAPEUTIC EXERCISES: CPT | Mod: PN | Performed by: PHYSICAL THERAPIST

## 2019-06-25 PROCEDURE — 97140 MANUAL THERAPY 1/> REGIONS: CPT | Mod: PN | Performed by: PHYSICAL THERAPIST

## 2019-06-25 PROCEDURE — 97161 PT EVAL LOW COMPLEX 20 MIN: CPT | Mod: PN | Performed by: PHYSICAL THERAPIST

## 2019-06-26 NOTE — PLAN OF CARE
PHYSICAL THERAPY INITIAL EVALUATION    Name:Samir Muñoz  Physician:Azar Michelle MD  Date of eval:6/25/2019  Orders:  Physical Therapy evaluate and treat  Clinic: [id number]  Diagnosis:  1. Chronic pain of left ankle     2. Difficulty walking     3. Muscle weakness     4. Decreased range of motion of left ankle           Medical Diagnosis: Achilles tendon rupture    Precautions: precautions for Achilles, R knee pain  Evaluation date: 6/25/2019  Visit # authorized: 1/16  Authorization period: 10-17-19  Plan of care expiration: 8-6-19  MD Follow up appt: 6-27-19 then 8-1-19    Time In: 4:05  Time Out: 5:15  Treatment time 25 min    Subjective     Chief complaint: pain in region right above ankle post calf  Onset of pain : 5-17-19   Mechanism of onset :  Pushing off with exercising and felt pop and thought was hit with hammer  Pt began weaning out of boot by not wearing boot at home in house, if go outside put boot on or walking long distance  Pt is 6 weeks post injury on 6-28-19    Pt also has R knee injury and pain R knee medial knee and with increased WB R     Aggravating factors: standing and walking prolonged  Easing factors: sitting and rest  Sleep is disturbed due to R knee and L ankle . 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    Allergies:    Review of patient's allergies indicates:   Allergen Reactions    No known allergies        Medical history:   Past Medical History:   Diagnosis Date    Cataract     OD    GERD (gastroesophageal reflux disease)     Hyperlipidemia     Hyperlipidemia     Shingles     involved eye    Sleep apnea  "    resolved with UPPP       Medication:   Current Outpatient Medications on File Prior to Visit   Medication Sig Dispense Refill    fluticasone (FLONASE) 50 mcg/actuation nasal spray       multivitamin (ONE DAILY MULTIVITAMIN) per tablet Take 1 tablet by mouth once daily.      naproxen (NAPROSYN) 500 MG tablet Take 1 tablet (500 mg total) by mouth 2 (two) times daily with meals. 15 tablet 0     No current facility-administered medications on file prior to visit.        Diagnostic tests: US due to swelling No evidence of deep venous thrombosis in the left lower extremity.    MRI:  Full-thickness Achilles tendon rupture at the level of the distal   myotendinous junction with partial overlapping of proximal distal fragments   despite some buckling of the distal fragment, distal fragment measures 9.7   centimeters in length with intact calcaneal insertion.            Xray: There is bunion formation at the great toe metatarsal head.  There are degenerative changes of the interphalangeal joints of the left forefoot.  There is Achilles insertion calcaneal enthesophyte formation.  The enthesophyte is discontinuous with the adjacent calcaneus, possibly due to trauma.  Please correlate clinically.  There is a small plantar calcaneal spur.  There are degenerative changes of the interphalangeal joints of the left forefoot.  There is soft tissue swelling over the dorsum of the forefoot.  There is metatarsus adductus.  No radiographically evident acute fracture or osseous destructive process.    Pain level with 0 being the lowest and 10 being the highest presently: 1  Pain level with 0 being the lowest and 10 being the highest at worst: 5  Pain level with 0 being the lowest and 10 being the highest at best: 1     Patient Goals: "increase strength of calf"    Objective     Postural examination in standing:  - forward head  - forward shoulders  - increased supination    Postural examination in sitting:   - forward head  - " forward shoulders  - LE positioned relaxed 90      Functional assessment:   - walking/gait:antalgic with foot flat with no push off and decreased heel strike  - sit to stand: no difficulty  - sit to supine: no difficulty       - supine to sit: no difficulty  - supine to prone: no difficulty     Ankle Girth: (measured in centimeters)   Ankle RLE LLE   Mid malleoli 28.4 30.5   Mid foot 27.8 28.5   MT heads 27.2 27.2   Mid calf  45.7 44.2     Ankle ROM: (measured in degrees)   Ankle RLE LLE   Dorsiflexion with knees straight 10 10   Dorsiflexion with knees bent 15 10   Plantarflexion 45 30   Inversion 30 25   Eversion 20 15         Flexibility testing:  - hamstrings:     90/90 test R 20 L 25                 Muscle Strength  MMT R L   Hip flexion 5/5 5/5   Hip abduction 5/5 5/5   Hip extension 5/5 5/5   Toe flexors 5/5 5/5   Toe extensors 5/5 5/5   Knee extension 5/5 5/5   Knee flexion 5/5 5/5   Ankle dorsiflexion 5/5 N/T   Ankle plantar flexion 5/5 N/T   Ankle inversion 5/5 N/T   Ankle eversion 5/5 N/T     Palpation: no TTP    Joint mobility: forefoot mid foot and toes, mod decreased mobility    Sensation: Intact    Outcome measures:    FOTO ankle disability index score: 43  PT reviewed FOTO scores for Samir Muñoz on 06/25/2019.   FOTO scores were entered into [facility] - see media section.    TREATMENT:  Therapeutic exercise: Samir received therapeutic exercises to develop strength, stabilization and endurance; flexibility and range of motion for 10 minutes including:see HEP sheet.     Ankle pumps x 10  Inversion/Eversion x 10  Heel raises sitting pain free zone pt performing about 25% range  X 10  Emphasis on pain free ROM    Instructed pt in need to elevate leg to address swelling    Worked with pt on normalizing gait and ambulation with straight cane x 5 min      Manual therapy: Samir  received the following manual therapy techniques x 10 min. to include soft tissue and joint mobilization were applied to the:  toes, forefoot and mid foot to include: gentle mob with retrograde massage through calf for swelling  Decreased mid malleoli girth to 29.5 after treatment          Modalities: Pt. received cold pack x 10 min. to L ankle elevated following treatment.    Pt. Education: Instructed pt. regarding:proper technique with all exercises. Pt. to demonstrate good understanding of the education provided. Samir demonstrated good return demonstration of activities. No cultural, environmental, or spiritual barriers identified to treatment or learning.    Assessment   This is a 58 y.o. male referred to outpatient physical therapy and presents with a medical diagnosis of s/p rupture L Achilles tendon and PT diagnosis/findings of pain in L ankle with swelling and loss of ROM and strength demonstrating limitations as described in the problem list. Patient was in agreement with set goals and plan of care. Pt was given a written HEP along with posture education, instruction on body mechanics, activity modification/avoidance, and core/LE strengthening regimen. Pt. verbally understood instructions and demonstrated proper form/technique. Pt was advised to perform these exercises free of pain, and discontinue use if symptoms persist/worsen. Pt will benefit from physical therapy services in order to maximize pain free functional independence. Rehab potential is good    History  Co-morbidities and personal factors that may impact the plan of care Examination  Body Structures and Functions, activity limitations and participation restrictions that may impact the plan of care    Clinical Presentation   Co-morbidities:   R knee pain        Personal Factors:   no deficits Body Regions:   lower extremities    Body Systems:    ROM  strength  gait            Participation Restrictions:   ADL     Activity limitations:   Learning and applying knowledge  no deficits    General Tasks and Commands  no deficits    Communication  no  deficits    Mobility  walking    Self care  no deficits    Domestic Life  shopping  cooking  doing house work (cleaning house, washing dishes, laundry)  assisting others    Interactions/Relationships  no deficits    Life Areas  no deficits    Community and Social Life  no deficits         stable and uncomplicated                      low   moderate  moderate Decision Making/ Complexity Score:  low     Medical necessity is demonstrated by the following IMPAIRMENTS/PROBLEM LIST:  Decreased range of motion  Decreased strength  Increased pain with walking  Antalgic gait  Increased pain with prolonged standing  Disturbed sleep  ADL and household activities lead to increased pain and are limited    GOALS:   Short Term Goals:  3 weeks  Increase range of motion 25%  Increase strength 1/2 muscle grade  Be able to perform HEP with minimal cueing required    Long Term Goals: 6 weeks  Increase range of motion to 75% to 100% full   Improve muscle strength 1 muscle grade  Improve muscle strength with MMT to 4+/5 to 5/5  Restore ability to ambulate with normal pain free gait  Walking for ADL and exercise will be restored without increased pain  Restore ability to stand for ADL without increased pain  Restore normal sleep habits without disturbances due to pain  Restore ability to perform ADL's and household activities independently and without increased pain    Plan     Pt will be treated by physical therapy 2 times a week for 6 weeks to include: Therapeutic exercises to increase ROM, strength and stabilization; joint and soft tissue mobilization with manual therapy techniques to decrease muscle tightness, pain and improve joint mobility; neuromuscular re-education to improve balance, coordination, kinesthetic sense and proprioception, therapeutic activities to improve coordination, strength and function, therapeutic taping to decrease pain, provide support and improve function of the LE(s); modalities such as moist heat, ice,  ultrasound and electrical stimulation to increase circulation, decrease pain and inflammation; dry needling with manual therapy techniques to decrease pain, inflammation and swelling, increase circulation and promote healing process will be considered and utilized as needed; temporary orthotics will be considered and utilized as needed to further decrease pain in WB.  Pt may be seen by PTA to carry out plan of care as part of the Rehab team.    I certify the need for these services furnished under this plan of treatment and while under my care.    ____________________________________ Physician/Referring Practitioner                                  Date of Signature

## 2019-06-27 ENCOUNTER — PATIENT MESSAGE (OUTPATIENT)
Dept: ORTHOPEDICS | Facility: CLINIC | Age: 59
End: 2019-06-27

## 2019-06-27 ENCOUNTER — OFFICE VISIT (OUTPATIENT)
Dept: ORTHOPEDICS | Facility: CLINIC | Age: 59
End: 2019-06-27
Payer: OTHER GOVERNMENT

## 2019-06-27 VITALS — HEIGHT: 74 IN | WEIGHT: 250 LBS | BODY MASS INDEX: 32.08 KG/M2

## 2019-06-27 DIAGNOSIS — S86.012D ACHILLES TENDON RUPTURE, LEFT, SUBSEQUENT ENCOUNTER: Primary | ICD-10-CM

## 2019-06-27 PROCEDURE — 99999 PR PBB SHADOW E&M-EST. PATIENT-LVL II: ICD-10-PCS | Mod: PBBFAC,,, | Performed by: ORTHOPAEDIC SURGERY

## 2019-06-27 PROCEDURE — 99999 PR PBB SHADOW E&M-EST. PATIENT-LVL II: CPT | Mod: PBBFAC,,, | Performed by: ORTHOPAEDIC SURGERY

## 2019-06-27 PROCEDURE — 99213 PR OFFICE/OUTPT VISIT, EST, LEVL III, 20-29 MIN: ICD-10-PCS | Mod: S$PBB,,, | Performed by: ORTHOPAEDIC SURGERY

## 2019-06-27 PROCEDURE — 99212 OFFICE O/P EST SF 10 MIN: CPT | Mod: PBBFAC,PN | Performed by: ORTHOPAEDIC SURGERY

## 2019-06-27 PROCEDURE — 99213 OFFICE O/P EST LOW 20 MIN: CPT | Mod: S$PBB,,, | Performed by: ORTHOPAEDIC SURGERY

## 2019-06-27 NOTE — PROGRESS NOTES
A 58-year-old eight weeks out from Achilles tendon injury treated nonoperatively   reports no pain, has been walking without the boot.    Exam still shows some swelling, with good motion.    PLAN:  At this point, we will advance therapy, get more aggressive with   strengthening and weightbearing exercise.  We will check him back in about six   weeks' time as an established patient.      NATHANAEL/JOSÉ MIGUEL  dd: 06/27/2019 15:38:31 (CDT)  td: 06/28/2019 05:51:28 (CDT)  Doc ID   #[id number]  Job ID #[id number]    CC:

## 2019-07-02 ENCOUNTER — TELEPHONE (OUTPATIENT)
Dept: REHABILITATION | Facility: HOSPITAL | Age: 59
End: 2019-07-02

## 2019-07-04 ENCOUNTER — PATIENT MESSAGE (OUTPATIENT)
Dept: ORTHOPEDICS | Facility: CLINIC | Age: 59
End: 2019-07-04

## 2019-07-05 ENCOUNTER — OFFICE VISIT (OUTPATIENT)
Dept: ORTHOPEDICS | Facility: CLINIC | Age: 59
End: 2019-07-05
Payer: OTHER GOVERNMENT

## 2019-07-05 VITALS — HEIGHT: 74 IN | BODY MASS INDEX: 32.08 KG/M2 | WEIGHT: 250 LBS

## 2019-07-05 DIAGNOSIS — M25.561 RIGHT KNEE PAIN, UNSPECIFIED CHRONICITY: Primary | ICD-10-CM

## 2019-07-05 DIAGNOSIS — M17.11 OSTEOARTHRITIS OF RIGHT KNEE, UNSPECIFIED OSTEOARTHRITIS TYPE: ICD-10-CM

## 2019-07-05 PROCEDURE — 99214 OFFICE O/P EST MOD 30 MIN: CPT | Mod: 25,S$PBB,, | Performed by: ORTHOPAEDIC SURGERY

## 2019-07-05 PROCEDURE — 20610 LARGE JOINT ASPIRATION/INJECTION: R KNEE: ICD-10-PCS | Mod: S$PBB,RT,, | Performed by: ORTHOPAEDIC SURGERY

## 2019-07-05 PROCEDURE — 99999 PR PBB SHADOW E&M-EST. PATIENT-LVL II: ICD-10-PCS | Mod: PBBFAC,,, | Performed by: ORTHOPAEDIC SURGERY

## 2019-07-05 PROCEDURE — 99212 OFFICE O/P EST SF 10 MIN: CPT | Mod: PBBFAC,PN,25 | Performed by: ORTHOPAEDIC SURGERY

## 2019-07-05 PROCEDURE — 99214 PR OFFICE/OUTPT VISIT, EST, LEVL IV, 30-39 MIN: ICD-10-PCS | Mod: 25,S$PBB,, | Performed by: ORTHOPAEDIC SURGERY

## 2019-07-05 PROCEDURE — 20610 DRAIN/INJ JOINT/BURSA W/O US: CPT | Mod: PBBFAC,PN | Performed by: ORTHOPAEDIC SURGERY

## 2019-07-05 PROCEDURE — 99999 PR PBB SHADOW E&M-EST. PATIENT-LVL II: CPT | Mod: PBBFAC,,, | Performed by: ORTHOPAEDIC SURGERY

## 2019-07-05 RX ORDER — TRIAMCINOLONE ACETONIDE 40 MG/ML
40 INJECTION, SUSPENSION INTRA-ARTICULAR; INTRAMUSCULAR
Status: DISCONTINUED | OUTPATIENT
Start: 2019-07-05 | End: 2019-07-05 | Stop reason: HOSPADM

## 2019-07-05 RX ADMIN — TRIAMCINOLONE ACETONIDE 40 MG: 40 INJECTION, SUSPENSION INTRA-ARTICULAR; INTRAMUSCULAR at 09:07

## 2019-07-05 NOTE — PROGRESS NOTES
A 58 years down his rehabilitation for Achilles tendon rupture treated   nonoperatively.    Exam of the knee shows tenderness at joint line without infection.    X-rays show some degenerative changes.    ASSESSMENT:  Degenerative disease of the knee.    ASSESSMENT:  1. Kenalog injection to the right knee.  2. Achilles tendon rupture, few months out.    PLAN:  Continue with strengthening exercises and we can check him back in   several months' time.        PBB/HN  dd: 07/05/2019 09:39:14 (CDT)  td: 07/05/2019 22:25:22 (CDT)  Doc ID   #[id number]  Job ID #[id number]    CC:     Further History  Aching pain  Worse with activity  Relieved with rest  No other associated symptoms  No other radiation    Further Exam  Alert and oriented  Pleasant  Contralateral limb has appropriate range of motion for age and condition  Contralateral limb has appropriate strength for age and condition  Contralateral limb has appropriate stability  for age and condition  No adenopathy  Pulses are appropriate for current condition  Skin is intact        Chief Complaint    Chief Complaint   Patient presents with    Right Knee - Pain       HPI  Samir Muñoz is a 58 y.o.  male who presents with       Past Medical History  Past Medical History:   Diagnosis Date    Cataract     OD    GERD (gastroesophageal reflux disease)     Hyperlipidemia     Hyperlipidemia     Shingles     involved eye    Sleep apnea     resolved with UPPP       Past Surgical History  Past Surgical History:   Procedure Laterality Date    ADENOIDECTOMY      BLOCK-NERVE Left 5/9/2016    Performed by Abhi Haley [facility] at [facility] OR    CATARACT EXTRACTION W/  INTRAOCULAR LENS IMPLANT Left 10/25/2017    Dr Reza    nerve block      for shingles    phacoemulsification of cataract with intraocular lens implant left eye Left 10/25/2017    [facility] at [facility] OR    RADIOFREQUENCY THERMOCOAGULATION supraorbital nerve Left 2/24/2017    Performed by  [facility]un [facility]rain [facility] at [facility] OR    TONSILLECTOMY      UVULOPALATOPHARYNGOPLASTY         Medications  Current Outpatient Medications   Medication Sig    fluticasone (FLONASE) 50 mcg/actuation nasal spray     multivitamin (ONE DAILY MULTIVITAMIN) per tablet Take 1 tablet by mouth once daily.    naproxen (NAPROSYN) 500 MG tablet Take 1 tablet (500 mg total) by mouth 2 (two) times daily with meals.     No current facility-administered medications for this visit.        Allergies  Review of patient's allergies indicates:   Allergen Reactions    No known allergies        Family History  Family History   Problem Relation Age of Onset    Hypertension Father     Autoimmune disease Brother     No Known Problems Daughter     No Known Problems Daughter     No Known Problems Daughter     No Known Problems Daughter     Cancer Maternal Grandmother     Heart attack Paternal Grandfather     Amblyopia Neg Hx     Blindness Neg Hx     Cataracts Neg Hx     Glaucoma Neg Hx     Diabetes Neg Hx     Macular degeneration Neg Hx     Retinal detachment Neg Hx     Strabismus Neg Hx     Stroke Neg Hx     Thyroid disease Neg Hx        Social History  Social History     Socioeconomic History    Marital status:      Spouse name: Not on file    Number of children: 4    Years of education: Not on file    Highest education level: Not on file   Occupational History    Not on file   Social Needs    Financial resource strain: Not on file    Food insecurity:     Worry: Not on file     Inability: Not on file    Transportation needs:     Medical: Not on file     Non-medical: Not on file   Tobacco Use    Smoking status: Never Smoker    Smokeless tobacco: Never Used   Substance and Sexual Activity    Alcohol use: Not on file     Comment: weekend    Drug use: No    Sexual activity: Yes     Partners: Female   Lifestyle    Physical activity:     Days per week: Not on file     Minutes per session: Not on file     Stress: Not on file   Relationships    Social connections:     Talks on phone: Not on file     Gets together: Not on file     Attends Judaism service: Not on file     Active member of club or organization: Not on file     Attends meetings of clubs or organizations: Not on file     Relationship status: Not on file   Other Topics Concern    Not on file   Social History Narrative    Not on file               Review of Systems     Constitutional: Negative    HENT: Negative  Eyes: Negative  Respiratory: Negative  Cardiovascular: Negative  Musculoskeletal: HPI  Skin: Negative  Neurological: Negative  Hematological: Negative  Endocrine: Negative                 Physical Exam    There were no vitals filed for this visit.  Body mass index is 32.1 kg/m².  Physical Examination:     General appearance -  well appearing, and in no distress  Mental status - awake  Neck - supple  Chest -  symmetric air entry  Heart - normal rate   Abdomen - soft      Assessment     1. Right knee pain, unspecified chronicity          Plan

## 2019-07-05 NOTE — PROCEDURES
Large Joint Aspiration/Injection: R knee  Date/Time: 7/5/2019 9:40 AM  Performed by: Azar Michelle MD  Authorized by: Azar Michelle MD     Consent Done?:  Yes (Verbal)  Indications:  Pain  Timeout: Prior to procedure the correct patient, procedure, and site was verified      Location:  Knee  Site:  R knee  Prep: Patient was prepped and draped in usual sterile fashion    Ultrasonic Guidance for needle placement: No  Needle size:  21 G  Approach:  Anterolateral  Medications:  40 mg triamcinolone acetonide 40 mg/mL  Patient tolerance:  Patient tolerated the procedure well with no immediate complications

## 2019-07-08 ENCOUNTER — CLINICAL SUPPORT (OUTPATIENT)
Dept: REHABILITATION | Facility: HOSPITAL | Age: 59
End: 2019-07-08
Attending: ORTHOPAEDIC SURGERY
Payer: OTHER GOVERNMENT

## 2019-07-08 DIAGNOSIS — G89.29 CHRONIC PAIN OF LEFT ANKLE: Primary | ICD-10-CM

## 2019-07-08 DIAGNOSIS — M25.572 CHRONIC PAIN OF LEFT ANKLE: Primary | ICD-10-CM

## 2019-07-08 DIAGNOSIS — R26.2 DIFFICULTY WALKING: ICD-10-CM

## 2019-07-08 DIAGNOSIS — M62.81 MUSCLE WEAKNESS: ICD-10-CM

## 2019-07-08 DIAGNOSIS — M25.672 DECREASED RANGE OF MOTION OF LEFT ANKLE: ICD-10-CM

## 2019-07-08 PROCEDURE — 97110 THERAPEUTIC EXERCISES: CPT | Mod: PN | Performed by: PHYSICAL THERAPIST

## 2019-07-08 PROCEDURE — 97140 MANUAL THERAPY 1/> REGIONS: CPT | Mod: PN | Performed by: PHYSICAL THERAPIST

## 2019-07-08 PROCEDURE — 97116 GAIT TRAINING THERAPY: CPT | Mod: PN | Performed by: PHYSICAL THERAPIST

## 2019-07-08 NOTE — PROGRESS NOTES
Physical Therapy Daily Note     Name: Samir Muñoz  Clinic Number: [id number]  Diagnosis:   Encounter Diagnoses   Name Primary?    Chronic pain of left ankle Yes    Difficulty walking     Muscle weakness     Decreased range of motion of left ankle      Physician: Azar Michelle MD    Treatment Orders: PT Eval and Treat    Past Medical History:   Diagnosis Date    Cataract     OD    GERD (gastroesophageal reflux disease)     Hyperlipidemia     Hyperlipidemia     Shingles     involved eye    Sleep apnea     resolved with UPPP     Medical Diagnosis: Achilles tendon rupture     Precautions: precautions for Achilles, R knee pain  Evaluation date: 6/25/2019  Date of Injury:  5-17-19  Visit # authorized: 2/16  Authorization period: 10-17-19  Plan of care expiration: 8-6-19  MD Follow up appt: 6-27-19 then 8-1-19       Time In:  3:05  Time Out:  4:15  Total Billable Time:  55 min    Subjective     Pt reports: R knee is still sore on medial knee  Pt states got a shot on Friday and did not help and has pain at rest or with walking  Ankle is feeling better Pt states no longer using boot other than long distance.   Still a little swelling    Pt was compliant with home exercise program.  Response to previous treatment: felt fine  Functional change: walking better from ankle limping due to knee pain R     Location: left ankle    Pain Scale: before treatment: 0 currently; after treatment: 0    Objective     8 weeks post injury on 7-12-19    Ankle Girth: (measured in centimeters) 7-8-19  Ankle LLE   Mid malleoli 30.6   Mid foot 28.0   MT heads 26.8   Mid calf  43.8      Ankle Girth: (measured in centimeters)  initial eval  Ankle RLE LLE   Mid malleoli 28.4 30.5   Mid foot 27.8 28.5   MT heads 27.2 27.2   Mid calf  45.7 44.2      Ankle ROM: (measured in degrees)   Ankle LLE   Dorsiflexion with knees straight 10   Dorsiflexion with knees bent 15   Plantarflexion 35   Inversion 30   Eversion 20         Ankle ROM:  (measured in degrees) initial eval  Ankle RLE LLE   Dorsiflexion with knees straight 10 10   Dorsiflexion with knees bent 15 10   Plantarflexion 45 30   Inversion 30 25   Eversion 20 15             TREATMENT  Therapeutic exercise: Samir received therapeutic exercises to develop strength, endurance, ROM, flexibility and core stabilization for 30 minutes including:   Ankle pumps x 10  Inversion/Eversion x 10   Ankle theraband all planes x 20 with GTB    HSS sitting on table x 10   GSS with strap x 10  Instructed pt to hold GSS standing which he had been doing for now to make sure not to overstretch  Heel raises sitting pain free zone pt performing about 25% range  X 10  Emphasis on pain free ROM Worked with pt on maintaining toes flat and MTP extension        minisquat x 10   Single leg balance x 10 sec x 3  Worked with pt on normalizing gait and ambulation with no device emphasis on heel to toe gait x 10 min  Pt was able to walk with more normal gait for R knee after HS stretching.       Provided Green theraband for home use     Manual therapy: Samir  received the following manual therapy techniques x 15 min. to include soft tissue and joint mobilization were applied to the: toes, forefoot and mid foot to include: gentle mob with retrograde massage through calf for swelling           Modalities: Pt. received cold pack x 10 min. to L ankle and R knee following treatment.       Written Home Exercises Provided: yes.  Exercises were reviewed and Marvin was able to demonstrate them prior to the end of the session.  Marvin demonstrated good  understanding of the education provided.     See EMR under Patient Instructions for exercises provided today.      Pt. education:  · Posture reeducation, body mechanics, HEP,   · No spiritual or educational barriers to learning provided  · Pt has no cultural, educational or language barriers to learning provided.    Assessment     Pt understands proper stretching and to avoid standing  gastroc stretch for now to adjust to seated first.  Pt with improved ROM, continue with some swelling.  Pt will benefit from normalizing gait to help decrease swelling.    Pt with improved gait for R knee also and had done HS stretching for R which painful area is near pes anserine.   Marvin is progressing well towards his goals.   Pt prognosis is Good.       Pt will continue to benefit from skilled outpatient physical therapy to address the remaining functional deficits, provide pt/family education, and to maximize pt's level of independence in the home and community environment. .     GOALS:   Short Term Goals:  3 weeks  Increase range of motion 25%  Increase strength 1/2 muscle grade  Be able to perform HEP with minimal cueing required     Long Term Goals: 6 weeks  Increase range of motion to 75% to 100% full   Improve muscle strength 1 muscle grade  Improve muscle strength with MMT to 4+/5 to 5/5  Restore ability to ambulate with normal pain free gait  Walking for ADL and exercise will be restored without increased pain  Restore ability to stand for ADL without increased pain  Restore normal sleep habits without disturbances due to pain  Restore ability to perform ADL's and household activities independently and without increased pain    Anticipated barriers to physical therapy: right knee pain  Pt's spiritual, cultural and educational needs considered and pt agreeable to plan of care and goals        Plan   Continue with established Plan of Care towards PT goals. Continue with boot for prolonged walking or standing    Rae Gibson, PT

## 2019-07-16 ENCOUNTER — CLINICAL SUPPORT (OUTPATIENT)
Dept: REHABILITATION | Facility: HOSPITAL | Age: 59
End: 2019-07-16
Attending: ORTHOPAEDIC SURGERY
Payer: OTHER GOVERNMENT

## 2019-07-16 DIAGNOSIS — M62.81 MUSCLE WEAKNESS: ICD-10-CM

## 2019-07-16 DIAGNOSIS — R26.2 DIFFICULTY WALKING: ICD-10-CM

## 2019-07-16 DIAGNOSIS — M25.672 DECREASED RANGE OF MOTION OF LEFT ANKLE: ICD-10-CM

## 2019-07-16 DIAGNOSIS — G89.29 CHRONIC PAIN OF LEFT ANKLE: Primary | ICD-10-CM

## 2019-07-16 DIAGNOSIS — M25.572 CHRONIC PAIN OF LEFT ANKLE: Primary | ICD-10-CM

## 2019-07-16 PROCEDURE — 97140 MANUAL THERAPY 1/> REGIONS: CPT | Mod: PN | Performed by: PHYSICAL THERAPIST

## 2019-07-16 PROCEDURE — 97110 THERAPEUTIC EXERCISES: CPT | Mod: PN | Performed by: PHYSICAL THERAPIST

## 2019-07-16 NOTE — PROGRESS NOTES
Physical Therapy Daily Note     Name: Samir Muñoz  Clinic Number: [id number]  Diagnosis:   Encounter Diagnoses   Name Primary?    Chronic pain of left ankle Yes    Difficulty walking     Muscle weakness     Decreased range of motion of left ankle      Physician: Azar Michelle MD    Treatment Orders: PT Eval and Treat    Past Medical History:   Diagnosis Date    Cataract     OD    GERD (gastroesophageal reflux disease)     Hyperlipidemia     Hyperlipidemia     Shingles     involved eye    Sleep apnea     resolved with UPPP     Medical Diagnosis: Achilles tendon rupture     Precautions: precautions for Achilles, R knee pain  Evaluation date: 6/25/2019  Date of Injury:  5-17-19  Visit # authorized: 3/16  Authorization period: 10-17-19  Plan of care expiration: 8-6-19  MD Follow up appt:  then 8-1-19       Time In:  4:09  Time Out:  5:10  Total Billable Time:  45 min    Subjective     Pt reports: R knee is better, but still a little sore,  L ankle is feeling good with no pain.  Pt states still gets swelling  No longer using boot  Pt was compliant with home exercise program.  Response to previous treatment: felt fine  Functional change: walking better from ankle limp a couple of steps and then gets more normal    Location: left ankle    Pain Scale: before treatment: 0 in ankle R knee 2 currently; after treatment: 1 in knee and 0 in ankle    Objective     8 weeks post injury on 7-12-19    Ankle Girth: (measured in centimeters) 7-8-19  Ankle LLE   Mid malleoli 30.6   Mid foot 28.0   MT heads 26.8   Mid calf  43.8      Ankle Girth: (measured in centimeters)  initial eval  Ankle RLE LLE   Mid malleoli 28.4 30.5   Mid foot 27.8 28.5   MT heads 27.2 27.2   Mid calf  45.7 44.2      Ankle ROM: (measured in degrees) 7-8-19  Ankle LLE   Dorsiflexion with knees straight 10   Dorsiflexion with knees bent 15   Plantarflexion 35   Inversion 30   Eversion 20         Ankle ROM: (measured in degrees) initial  "eval  Ankle RLE LLE   Dorsiflexion with knees straight 10 10   Dorsiflexion with knees bent 15 10   Plantarflexion 45 30   Inversion 30 25   Eversion 20 15             TREATMENT  Therapeutic exercise: Samir received therapeutic exercises to develop strength, endurance, ROM, flexibility and core stabilization for 30 minutes including:   Ankle pumps x 10  Inversion/Eversion x 10  Ankle theraband all planes x 20 with GTB emphasis on eccentric also  HSS sitting on table x 10  GSS with strap x 10  Reiterated not overstretching   Heel raises sitting pain free zone pt performing about 25% range  X 10  Emphasis on pain free ROM Worked with pt on maintaining toes flat and MTP extension   Improved ability to perform heel raises sitting      minisquat x 20  Single leg balance x 10 sec x 3   10 B heel raises 60% R 40% L, worked with pt on proper form to keep knee extended and not lean through hands and to shift weight appropriately with emphasis on pain free zone    Instructed pt to elevate leg at end of day 20-30 min        Manual therapy: Samir  received the following manual therapy techniques x 15 min. to include soft tissue and joint mobilization were applied to the: toes, forefoot and mid foot to include: gentle mob   Pt received tool-assisted massage with manual therapy techniques to R HS to trigger an inflammatory healing response and stimulate the production of new collagen and proper, more functional, less painful healing.    2 16" fans with kinesiotape was provided to the knee to decrease swelling .  Instructed pt in use, care and precautions with tape.             Modalities: Pt. received cold pack x 10 min. to L ankle and R knee following treatment.       Written Home Exercises Provided: yes.  Exercises were reviewed and Marvin was able to demonstrate them prior to the end of the session.  Marvin demonstrated good  understanding of the education provided.     See EMR under Patient Instructions for exercises provided " today.      Pt. education:  · Posture reeducation, body mechanics, HEP,   · No spiritual or educational barriers to learning provided  · Pt has no cultural, educational or language barriers to learning provided.    Assessment   Gait improving, pt appears to understand proper stretching and strengthening  Pt may benefit from KT tape due to continued swelling    Marvin is progressing well towards his goals.   Pt prognosis is Good.       Pt will continue to benefit from skilled outpatient physical therapy to address the remaining functional deficits, provide pt/family education, and to maximize pt's level of independence in the home and community environment. .     GOALS:   Short Term Goals:  3 weeks  Increase range of motion 25%  Increase strength 1/2 muscle grade  Be able to perform HEP with minimal cueing required     Long Term Goals: 6 weeks  Increase range of motion to 75% to 100% full   Improve muscle strength 1 muscle grade  Improve muscle strength with MMT to 4+/5 to 5/5  Restore ability to ambulate with normal pain free gait  Walking for ADL and exercise will be restored without increased pain  Restore ability to stand for ADL without increased pain  Restore normal sleep habits without disturbances due to pain  Restore ability to perform ADL's and household activities independently and without increased pain    Anticipated barriers to physical therapy: right knee pain  Pt's spiritual, cultural and educational needs considered and pt agreeable to plan of care and goals        Plan   Continue with established Plan of Care towards PT goals.     Rae Gibson, PT

## 2019-07-17 NOTE — PROGRESS NOTES
Physical Therapy Daily Note     Name: Samir Muñoz  Clinic Number: [id number]  Diagnosis:   Encounter Diagnoses   Name Primary?    Chronic pain of left ankle Yes    Difficulty walking     Muscle weakness     Decreased range of motion of left ankle      Physician: Azar Michelle MD    Treatment Orders: PT Eval and Treat    Past Medical History:   Diagnosis Date    Cataract     OD    GERD (gastroesophageal reflux disease)     Hyperlipidemia     Hyperlipidemia     Shingles     involved eye    Sleep apnea     resolved with UPPP     Medical Diagnosis: Achilles tendon rupture     Precautions: precautions for Achilles, R knee pain  Evaluation date: 6/25/2019  Date of Injury:  5-17-19  Visit # authorized: 4/16  Authorization period: 10-17-19  Plan of care expiration: 8-6-19  MD Follow up appt:  then 8-1-19       Time In:  4:09  Time Out:  5:10  Total Billable Time:  45 min    Subjective     Pt reports: R knee is better, but still a little sore,  L ankle is feeling good with no pain.  Pt states still gets swelling  No longer using boot  Pt was compliant with home exercise program.  Response to previous treatment: felt fine  Functional change: walking better from ankle limp a couple of steps and then gets more normal    Location: left ankle    Pain Scale: before treatment: 0 in ankle R knee 2 currently; after treatment: 1 in knee and 0 in ankle    Objective     8 weeks post injury on 7-12-19    Ankle Girth: (measured in centimeters) 7-8-19  Ankle LLE   Mid malleoli 30.6   Mid foot 28.0   MT heads 26.8   Mid calf  43.8      Ankle Girth: (measured in centimeters)  initial eval  Ankle RLE LLE   Mid malleoli 28.4 30.5   Mid foot 27.8 28.5   MT heads 27.2 27.2   Mid calf  45.7 44.2      Ankle ROM: (measured in degrees) 7-8-19  Ankle LLE   Dorsiflexion with knees straight 10   Dorsiflexion with knees bent 15   Plantarflexion 35   Inversion 30   Eversion 20         Ankle ROM: (measured in degrees) initial  "eval  Ankle RLE LLE   Dorsiflexion with knees straight 10 10   Dorsiflexion with knees bent 15 10   Plantarflexion 45 30   Inversion 30 25   Eversion 20 15             TREATMENT  Therapeutic exercise: Samir received therapeutic exercises to develop strength, endurance, ROM, flexibility and core stabilization for 30 minutes including:   Ankle pumps x 10  Inversion/Eversion x 10  Ankle theraband all planes x 20 with GTB emphasis on eccentric also  HSS sitting on table x 10  GSS with strap x 10  Reiterated not overstretching   Heel raises sitting pain free zone pt performing about 25% range  X 10  Emphasis on pain free ROM Worked with pt on maintaining toes flat and MTP extension   Improved ability to perform heel raises sitting      minisquat x 20  Single leg balance x 10 sec x 3   10 B heel raises 60% R 40% L, worked with pt on proper form to keep knee extended and not lean through hands and to shift weight appropriately with emphasis on pain free zone    Instructed pt to elevate leg at end of day 20-30 min        Manual therapy: Samir  received the following manual therapy techniques x 15 min. to include soft tissue and joint mobilization were applied to the: toes, forefoot and mid foot to include: gentle mob   Pt received tool-assisted massage with manual therapy techniques to R HS to trigger an inflammatory healing response and stimulate the production of new collagen and proper, more functional, less painful healing.    2 16" fans with kinesiotape was provided to the knee to decrease swelling .  Instructed pt in use, care and precautions with tape.             Modalities: Pt. received cold pack x 10 min. to L ankle and R knee following treatment.       Written Home Exercises Provided: yes.  Exercises were reviewed and Marvin was able to demonstrate them prior to the end of the session.  Marvin demonstrated good  understanding of the education provided.     See EMR under Patient Instructions for exercises provided " today.      Pt. education:  · Posture reeducation, body mechanics, HEP,   · No spiritual or educational barriers to learning provided  · Pt has no cultural, educational or language barriers to learning provided.    Assessment   Gait improving, pt appears to understand proper stretching and strengthening  Pt may benefit from KT tape due to continued swelling    Marvin is progressing well towards his goals.   Pt prognosis is Good.       Pt will continue to benefit from skilled outpatient physical therapy to address the remaining functional deficits, provide pt/family education, and to maximize pt's level of independence in the home and community environment. .     GOALS:   Short Term Goals:  3 weeks  Increase range of motion 25%  Increase strength 1/2 muscle grade  Be able to perform HEP with minimal cueing required     Long Term Goals: 6 weeks  Increase range of motion to 75% to 100% full   Improve muscle strength 1 muscle grade  Improve muscle strength with MMT to 4+/5 to 5/5  Restore ability to ambulate with normal pain free gait  Walking for ADL and exercise will be restored without increased pain  Restore ability to stand for ADL without increased pain  Restore normal sleep habits without disturbances due to pain  Restore ability to perform ADL's and household activities independently and without increased pain    Anticipated barriers to physical therapy: right knee pain  Pt's spiritual, cultural and educational needs considered and pt agreeable to plan of care and goals        Plan   Continue with established Plan of Care towards PT goals.     Rae Gibson, PT

## 2019-07-18 ENCOUNTER — CLINICAL SUPPORT (OUTPATIENT)
Dept: REHABILITATION | Facility: HOSPITAL | Age: 59
End: 2019-07-18
Attending: ORTHOPAEDIC SURGERY
Payer: OTHER GOVERNMENT

## 2019-07-18 DIAGNOSIS — G89.29 CHRONIC PAIN OF LEFT ANKLE: Primary | ICD-10-CM

## 2019-07-18 DIAGNOSIS — R26.2 DIFFICULTY WALKING: ICD-10-CM

## 2019-07-18 DIAGNOSIS — M25.672 DECREASED RANGE OF MOTION OF LEFT ANKLE: ICD-10-CM

## 2019-07-18 DIAGNOSIS — M25.572 CHRONIC PAIN OF LEFT ANKLE: Primary | ICD-10-CM

## 2019-07-18 DIAGNOSIS — M62.81 MUSCLE WEAKNESS: ICD-10-CM

## 2019-07-18 PROCEDURE — 97110 THERAPEUTIC EXERCISES: CPT | Mod: PN | Performed by: PHYSICAL THERAPIST

## 2019-07-18 PROCEDURE — 97140 MANUAL THERAPY 1/> REGIONS: CPT | Mod: PN | Performed by: PHYSICAL THERAPIST

## 2019-07-18 NOTE — PROGRESS NOTES
Physical Therapy Daily Note     Name: Samir Muñoz  Clinic Number: [id number]  Diagnosis:   Encounter Diagnoses   Name Primary?    Chronic pain of left ankle Yes    Difficulty walking     Muscle weakness     Decreased range of motion of left ankle      Physician: Azar Michelle MD    Treatment Orders: PT Eval and Treat    Past Medical History:   Diagnosis Date    Cataract     OD    GERD (gastroesophageal reflux disease)     Hyperlipidemia     Hyperlipidemia     Shingles     involved eye    Sleep apnea     resolved with UPPP     Medical Diagnosis: Achilles tendon rupture     Precautions: precautions for Achilles, R knee pain  Evaluation date: 6/25/2019  Date of Injury:  5-17-19  Visit # authorized: 4 / 16  Authorization period: 10-17-19  Plan of care expiration: 8-6-19  MD Follow up appt:  then 8-1-19       Time In:  4:05  Time Out:  4:40 (PT took over care at this point, see documentation under ELIESER Gibson)  Total Billable Time:  45 min    Subjective     Pt reports: L ankle is still a little swollen maybe due to being at his desk but pain is better overall.   Pt was compliant with home exercise program.  Response to previous treatment: felt fine  Functional change: walking better from ankle limp a couple of steps and then gets more normal    Location: left ankle    Pain Scale: before treatment: 0 in ankle R knee 2 currently; after treatment: 1 in knee and 0 in ankle    Objective     8 weeks post injury on 7-12-19    Ankle Girth: (measured in centimeters) 7-18-19  Ankle LLE   Mid malleoli 30.5   Mid foot 26.5   MT heads 26.6   Mid calf  44.0      Ankle Girth: (measured in centimeters)  initial eval  Ankle RLE LLE   Mid malleoli 28.4 30.5   Mid foot 27.8 28.5   MT heads 27.2 27.2   Mid calf  45.7 44.2      Ankle ROM: (measured in degrees) 7-8-19  Ankle LLE   Dorsiflexion with knees straight 10   Dorsiflexion with knees bent 15   Plantarflexion 35   Inversion 30   Eversion 20         Ankle ROM:  "(measured in degrees) initial eval  Ankle RLE LLE   Dorsiflexion with knees straight 10 10   Dorsiflexion with knees bent 15 10   Plantarflexion 45 30   Inversion 30 25   Eversion 20 15             TREATMENT  Therapeutic exercise: Samir received therapeutic exercises to develop strength, endurance, ROM, flexibility and core stabilization for 30 minutes including:   Ankle pumps x 10  Inversion/Eversion x 10  Ankle theraband all planes x 20 with GTB emphasis on eccentric also  HSS sitting on table x 10  GSS with strap x 10  Reiterated not overstretching   Heel raises sitting pain free zone pt performing about 25% range  X 10  Emphasis on pain free ROM Worked with pt on maintaining toes flat and MTP extension   Improved ability to perform heel raises sitting      minisquat x 20    Remaining treatment below performed with ELIESER Gibson PT.  Single leg balance x 10 sec x 3   10 B heel raises 60% R 40% L, worked with pt on proper form to keep knee extended and not lean through hands and to shift weight appropriately with emphasis on pain free zone    Instructed pt to elevate leg at end of day 20-30 min        Manual therapy: Samir  received the following manual therapy techniques x 15 min. to include soft tissue and joint mobilization were applied to the: toes, forefoot and mid foot to include: gentle mob   Pt received tool-assisted massage with manual therapy techniques to R HS to trigger an inflammatory healing response and stimulate the production of new collagen and proper, more functional, less painful healing.    2 16" fans with kinesiotape was provided to the knee to decrease swelling .  Instructed pt in use, care and precautions with tape.             Modalities: Pt. received cold pack x 10 min. to L ankle and R knee following treatment.       Written Home Exercises Provided: yes.  Exercises were reviewed and Marvin was able to demonstrate them prior to the end of the session.  Marvin demonstrated good  understanding " of the education provided.     See EMR under Patient Instructions for exercises provided today.      Pt. education:  · Posture reeducation, body mechanics, HEP,   · No spiritual or educational barriers to learning provided  · Pt has no cultural, educational or language barriers to learning provided.    Assessment   Gait improving, pt appears to understand proper stretching and strengthening  Pt may benefit from KT tape due to continued swelling    Marvin is progressing well towards his goals.   Pt prognosis is Good.       Pt will continue to benefit from skilled outpatient physical therapy to address the remaining functional deficits, provide pt/family education, and to maximize pt's level of independence in the home and community environment. .     GOALS:   Short Term Goals:  3 weeks  Increase range of motion 25%  Increase strength 1/2 muscle grade  Be able to perform HEP with minimal cueing required     Long Term Goals: 6 weeks  Increase range of motion to 75% to 100% full   Improve muscle strength 1 muscle grade  Improve muscle strength with MMT to 4+/5 to 5/5  Restore ability to ambulate with normal pain free gait  Walking for ADL and exercise will be restored without increased pain  Restore ability to stand for ADL without increased pain  Restore normal sleep habits without disturbances due to pain  Restore ability to perform ADL's and household activities independently and without increased pain    Anticipated barriers to physical therapy: right knee pain  Pt's spiritual, cultural and educational needs considered and pt agreeable to plan of care and goals        Plan   Continue with established Plan of Care towards PT goals.     Vidhi Melgar, PTA

## 2019-07-23 ENCOUNTER — CLINICAL SUPPORT (OUTPATIENT)
Dept: REHABILITATION | Facility: HOSPITAL | Age: 59
End: 2019-07-23
Attending: ORTHOPAEDIC SURGERY
Payer: OTHER GOVERNMENT

## 2019-07-23 DIAGNOSIS — R26.2 DIFFICULTY WALKING: ICD-10-CM

## 2019-07-23 DIAGNOSIS — M25.572 CHRONIC PAIN OF LEFT ANKLE: Primary | ICD-10-CM

## 2019-07-23 DIAGNOSIS — M62.81 MUSCLE WEAKNESS: ICD-10-CM

## 2019-07-23 DIAGNOSIS — M25.672 DECREASED RANGE OF MOTION OF LEFT ANKLE: ICD-10-CM

## 2019-07-23 DIAGNOSIS — G89.29 CHRONIC PAIN OF LEFT ANKLE: Primary | ICD-10-CM

## 2019-07-23 PROCEDURE — 97140 MANUAL THERAPY 1/> REGIONS: CPT | Mod: PN | Performed by: PHYSICAL THERAPIST

## 2019-07-23 PROCEDURE — 97110 THERAPEUTIC EXERCISES: CPT | Mod: PN | Performed by: PHYSICAL THERAPIST

## 2019-07-23 NOTE — PROGRESS NOTES
Physical Therapy Daily Note     Name: Samir Muñoz  Clinic Number: [id number]  Diagnosis:   Encounter Diagnoses   Name Primary?    Chronic pain of left ankle Yes    Difficulty walking     Muscle weakness     Decreased range of motion of left ankle      Physician: Azar Michelle MD    Treatment Orders: PT Eval and Treat    Past Medical History:   Diagnosis Date    Cataract     OD    GERD (gastroesophageal reflux disease)     Hyperlipidemia     Hyperlipidemia     Shingles     involved eye    Sleep apnea     resolved with UPPP     Medical Diagnosis: Achilles tendon rupture     Precautions: precautions for Achilles, R knee pain  Evaluation date: 6/25/2019  Date of Injury:  5-17-19  Visit # authorized: 5/16  Authorization period: 10-17-19  Plan of care expiration: 8-6-19  MD Follow up appt:  then 8-1-19       Time In:  4:03  Time Out:  5:15  Total Billable Time:  55 min    Subjective     Pt reports: little sore today after bike riding for an hour  Pt now able to walk 5 blocks without difficulty  Pt able to sleep better  Pt has been working in home office and had foot down all day today  Pt is biking and swimming   Pt was compliant with home exercise program.  Response to previous treatment: felt fine  Functional change: walking better from ankle limp a couple of steps and then gets more normal    Location: left ankle    Pain Scale: before treatment: 2 in ankle R HS 3-4 currently; after treatment: and 0 in ankle and 0 in knee    Objective     8 weeks post injury on 7-12-19    Calf tone needs improvement    Ankle Girth: (measured in centimeters) 7-23-19 similar to 7-18-19  Ankle LLE   Mid malleoli 30.6   Mid foot 26.5   MT heads 26.6   Mid calf  44.0         Ankle Girth: (measured in centimeters) 7-8-19  Ankle LLE   Mid malleoli 30.6   Mid foot 28.0   MT heads 26.8   Mid calf  43.8      Ankle Girth: (measured in centimeters)  initial eval  Ankle RLE LLE   Mid malleoli 28.4 30.5   Mid foot 27.8 28.5   MT  heads 27.2 27.2   Mid calf  45.7 44.2      Ankle ROM: (measured in degrees) 7-23-19  Ankle LLE   Dorsiflexion with knees straight 15   Dorsiflexion with knees bent 15   Plantarflexion 40   Inversion 30   Eversion 20         Ankle ROM: (measured in degrees) initial eval  Ankle RLE LLE   Dorsiflexion with knees straight 10 10   Dorsiflexion with knees bent 15 10   Plantarflexion 45 30   Inversion 30 25   Eversion 20 15             TREATMENT  Therapeutic exercise: Samir received therapeutic exercises to develop strength, endurance, ROM, flexibility and core stabilization for 40 minutes including:     Ankle pumps x 10  Inversion/Eversion x 10  Ankle theraband all planes x 20 with BTB emphasis on eccentric also  HSS sitting on table x 10  (NP) due to good flexibility GSS with strap x 10  Heel raises sitting pain free zone pt performing X 20 with 5#  Emphasis on pain free ROM Worked with pt on maintaining toes flat and MTP extension  improving form      minisquat x 20  Single leg balance x 30 sec x 2    20 B heel raises 50% R 50% L, improved form emphasis on eccentric      Wobble board level 1 x 10 balance 1 min  Foam walk x 3 min    Pt able to climb stairs reciprocally well with no pain  Little tight feeling after ex did a few reps of GSS standing and emphasis on gently stretching        Instructed pt to elevate leg at end of day 20-30 min        Manual therapy: Samir  received the following manual therapy techniques x 15 min. to include soft tissue and joint mobilization were applied to the: toes, forefoot and mid foot to include: gentle mob   Pt received tool-assisted massage with manual therapy techniques to R HS to trigger an inflammatory healing response and stimulate the production of new collagen and proper, more functional, less painful healing.    Vacuum/cupping STM with manual therapy techniques was performed to R HS to decrease muscle tightness, increase circulation and promote healing process.  The pt's skin  "was monitored for redness adjusting pressure as needed. The pt was instructed in possible side effects of bruising and/or soreness.        (NP) due to painting in AM and will sweat it off2 16" fans with kinesiotape was provided to the knee to decrease swelling .  Instructed pt in use, care and precautions with tape.       Modalities: Pt. received cold pack x 10 min. to L ankle elevated following treatment.       Written Home Exercises Provided: yes.  Exercises were reviewed and Marvin was able to demonstrate them prior to the end of the session.  Marvin demonstrated good  understanding of the education provided.     See EMR under Patient Instructions for exercises provided today.      Pt. education:  · Posture reeducation, body mechanics, HEP,   · No spiritual or educational barriers to learning provided  · Pt has no cultural, educational or language barriers to learning provided.    Assessment   Pt with no change in swelling, but ankle feels better he feels and progressing slowly with strengthening  Pt with good flexibility in calf so no need for further stretching at this time except for a few reps after exercise to loosen up and understands need for caution with stretching . Pt continues with HS symptoms, improved after manual therapy.  Mod fibrosis noted khang in distal med HS>      Marvin is progressing well towards his goals.   Pt prognosis is Good.       Pt will continue to benefit from skilled outpatient physical therapy to address the remaining functional deficits, provide pt/family education, and to maximize pt's level of independence in the home and community environment. .     GOALS:   Short Term Goals:  3 weeks  Increase range of motion 25%  Increase strength 1/2 muscle grade  Be able to perform HEP with minimal cueing required     Long Term Goals: 6 weeks  Increase range of motion to 75% to 100% full   Improve muscle strength 1 muscle grade  Improve muscle strength with MMT to 4+/5 to 5/5  Restore ability to " ambulate with normal pain free gait  Walking for ADL and exercise will be restored without increased pain  Restore ability to stand for ADL without increased pain  Restore normal sleep habits without disturbances due to pain  Restore ability to perform ADL's and household activities independently and without increased pain    Anticipated barriers to physical therapy: right knee pain  Pt's spiritual, cultural and educational needs considered and pt agreeable to plan of care and goals        Plan   Continue with established Plan of Care towards PT goals. Pt only able to attend 1 time a week until MD appt so understands continuation of HEP    Rae Gibson, PT

## 2019-07-29 NOTE — PROGRESS NOTES
Physical Therapy Daily Note     Name: Samir Muñoz  Clinic Number: [id number]  Diagnosis:   Encounter Diagnoses   Name Primary?    Chronic pain of left ankle Yes    Difficulty walking     Muscle weakness     Decreased range of motion of left ankle      Physician: Azar Michelle MD    Treatment Orders: PT Eval and Treat    Past Medical History:   Diagnosis Date    Cataract     OD    GERD (gastroesophageal reflux disease)     Hyperlipidemia     Hyperlipidemia     Shingles     involved eye    Sleep apnea     resolved with UPPP     Medical Diagnosis: Achilles tendon rupture     Precautions: precautions for Achilles, R knee pain  Evaluation date: 6/25/2019  Date of Injury:  5-17-19  Visit # authorized: 6/16  Authorization period: 10-17-19  Plan of care expiration: 8-6-19  MD Follow up appt: 8-8-19       Time In:  4:03  Time Out:  5:15  Total Billable Time:  55 min    Subjective     Pt reports: ankle feeling good, knee still with pain  Pt was compliant with home exercise program.  Response to previous treatment: felt fine  Functional change: walking better from ankle limp a couple of steps and then gets more normal    Location: left ankle    Pain Scale: before treatment: 0 in ankle knee pain is 0-6 presently 2 to start; after treatment: and 0 in ankle and 0 in knee    Objective     12 weeks post injury on 8-9-19    Calf tone needs improvement    Ankle Girth: (measured in centimeters) 7-23-19 similar to 7-18-19  Ankle LLE   Mid malleoli 30.6   Mid foot 26.5   MT heads 26.6   Mid calf  44.0         Ankle Girth: (measured in centimeters) 7-8-19  Ankle LLE   Mid malleoli 30.6   Mid foot 28.0   MT heads 26.8   Mid calf  43.8      Ankle Girth: (measured in centimeters)  initial eval  Ankle RLE LLE   Mid malleoli 28.4 30.5   Mid foot 27.8 28.5   MT heads 27.2 27.2   Mid calf  45.7 44.2      Ankle ROM: (measured in degrees) 7-23-19  Ankle LLE   Dorsiflexion with knees straight 15   Dorsiflexion with knees bent  15   Plantarflexion 40   Inversion 30   Eversion 20         Ankle ROM: (measured in degrees) initial eval  Ankle RLE LLE   Dorsiflexion with knees straight 10 10   Dorsiflexion with knees bent 15 10   Plantarflexion 45 30   Inversion 30 25   Eversion 20 15             TREATMENT  Therapeutic exercise: Samir received therapeutic exercises to develop strength, endurance, ROM, flexibility and core stabilization for 40 minutes including:     Ankle theraband all planes x 20 with Black TB emphasis on eccentric also  HSS sitting on table x 10  (NP) due to good flexibility GSS with strap x 10  Heel raises sitting pain free zone pt performing X 30 with 10#  Emphasis on pain free ROM Worked with pt on maintaining toes flat and MTP extension  improving form      minisquat x 20  Single leg balance x 30 sec x 2    20 B heel raises 50% R 50% L, improved form emphasis on eccentric Still decreased tone as compared to R but improving      Wobble board level 2 x 10 balance 1 min  Foam walk x 3 min    Pt able to climb stairs reciprocally well with no pain  Little tight feeling after ex did a few reps of GSS standing and emphasis on gently stretching        Instructed pt to continue to elevate leg at end of day 20-30 min        Manual therapy: Samir  received the following manual therapy techniques x 15 min. to include soft tissue and joint mobilization were applied to the: toes, forefoot and mid foot to include: gentle mob   Pt received tool-assisted massage with manual therapy techniques to R HS to trigger an inflammatory healing response and stimulate the production of new collagen and proper, more functional, less painful healing.    Vacuum/cupping STM with manual therapy techniques was performed to R HS to decrease muscle tightness, increase circulation and promote healing process.  The pt's skin was monitored for redness adjusting pressure as needed. The pt was instructed in possible side effects of bruising and/or soreness.   "      (NP) due to painting in AM and will sweat it off2 16" fans with kinesiotape was provided to the knee to decrease swelling .  Instructed pt in use, care and precautions with tape.       Modalities: Pt. received cold pack x 10 min. to L ankle elevated following treatment.       Written Home Exercises Provided: yes.  Exercises were reviewed and Marvin was able to demonstrate them prior to the end of the session.  Marvin demonstrated good  understanding of the education provided.     See EMR under Patient Instructions for exercises provided today.      Pt. education:  · Posture reeducation, body mechanics, HEP,   · No spiritual or educational barriers to learning provided  · Pt has no cultural, educational or language barriers to learning provided.    Assessment   Pt with no ankle complaints, more knee pain than ankle pain Pt to be out of town this week, so will test walking ability and will continue to work on HEP    Marvin is progressing well towards his goals.   Pt prognosis is Good.       Pt will continue to benefit from skilled outpatient physical therapy to address the remaining functional deficits, provide pt/family education, and to maximize pt's level of independence in the home and community environment. .     GOALS:   Short Term Goals:  3 weeks  Increase range of motion 25%  Increase strength 1/2 muscle grade  Be able to perform HEP with minimal cueing required     Long Term Goals: 6 weeks  Increase range of motion to 75% to 100% full   Improve muscle strength 1 muscle grade  Improve muscle strength with MMT to 4+/5 to 5/5  Restore ability to ambulate with normal pain free gait  Walking for ADL and exercise will be restored without increased pain  Restore ability to stand for ADL without increased pain  Restore normal sleep habits without disturbances due to pain  Restore ability to perform ADL's and household activities independently and without increased pain    Anticipated barriers to physical therapy: " right knee pain  Pt's spiritual, cultural and educational needs considered and pt agreeable to plan of care and goals        Plan   Continue with established Plan of Care towards PT goals. Reassess next visit    Rae Gibson, PT

## 2019-07-30 ENCOUNTER — CLINICAL SUPPORT (OUTPATIENT)
Dept: REHABILITATION | Facility: HOSPITAL | Age: 59
End: 2019-07-30
Attending: ORTHOPAEDIC SURGERY
Payer: OTHER GOVERNMENT

## 2019-07-30 DIAGNOSIS — G89.29 CHRONIC PAIN OF LEFT ANKLE: Primary | ICD-10-CM

## 2019-07-30 DIAGNOSIS — M25.572 CHRONIC PAIN OF LEFT ANKLE: Primary | ICD-10-CM

## 2019-07-30 DIAGNOSIS — R26.2 DIFFICULTY WALKING: ICD-10-CM

## 2019-07-30 DIAGNOSIS — M62.81 MUSCLE WEAKNESS: ICD-10-CM

## 2019-07-30 DIAGNOSIS — M25.672 DECREASED RANGE OF MOTION OF LEFT ANKLE: ICD-10-CM

## 2019-07-30 PROCEDURE — 97140 MANUAL THERAPY 1/> REGIONS: CPT | Mod: PN | Performed by: PHYSICAL THERAPIST

## 2019-07-30 PROCEDURE — 97110 THERAPEUTIC EXERCISES: CPT | Mod: PN | Performed by: PHYSICAL THERAPIST

## 2019-08-01 ENCOUNTER — PATIENT OUTREACH (OUTPATIENT)
Dept: ADMINISTRATIVE | Facility: HOSPITAL | Age: 59
End: 2019-08-01

## 2019-08-06 ENCOUNTER — CLINICAL SUPPORT (OUTPATIENT)
Dept: REHABILITATION | Facility: HOSPITAL | Age: 59
End: 2019-08-06
Attending: ORTHOPAEDIC SURGERY
Payer: OTHER GOVERNMENT

## 2019-08-06 DIAGNOSIS — G89.29 CHRONIC PAIN OF LEFT ANKLE: Primary | ICD-10-CM

## 2019-08-06 DIAGNOSIS — R26.2 DIFFICULTY WALKING: ICD-10-CM

## 2019-08-06 DIAGNOSIS — M25.672 DECREASED RANGE OF MOTION OF LEFT ANKLE: ICD-10-CM

## 2019-08-06 DIAGNOSIS — M25.572 CHRONIC PAIN OF LEFT ANKLE: Primary | ICD-10-CM

## 2019-08-06 DIAGNOSIS — M62.81 MUSCLE WEAKNESS: ICD-10-CM

## 2019-08-06 PROCEDURE — 97110 THERAPEUTIC EXERCISES: CPT | Mod: PN | Performed by: PHYSICAL THERAPIST

## 2019-08-06 NOTE — PLAN OF CARE
Physical Therapy Progress Note     Name: Samir Muñoz  Clinic Number: [id number]  Diagnosis:   Encounter Diagnoses   Name Primary?    Chronic pain of left ankle Yes    Difficulty walking     Muscle weakness     Decreased range of motion of left ankle      Physician: Azar Michelle MD    Treatment Orders: PT Eval and Treat    Past Medical History:   Diagnosis Date    Cataract     OD    GERD (gastroesophageal reflux disease)     Hyperlipidemia     Hyperlipidemia     Shingles     involved eye    Sleep apnea     resolved with UPPP     Medical Diagnosis: Achilles tendon rupture     Precautions: precautions for Achilles, R knee pain  Evaluation date: 6/25/2019  Date of Injury:  5-17-19  Visit # authorized: 7/16  Authorization period: 10-17-19  Plan of care expiration: 8-6-19  MD Follow up appt: 8-8-19       Time In:  4:03  Time Out:  5:15  Total Billable Time:  30 min    Subjective     Pt reports: ankle feeling good, knee still with pain  Pt reports not limited with weight bearing and walking activities due to ankle, but is having knee pain  Pt was compliant with home exercise program.  Response to previous treatment: felt fine      Location: left ankle    Pain Scale: before treatment: 0 in ankle  to start; after treatment: and 0 in ankle     Objective     12 weeks post injury on 8-9-19    Calf tone needs improvement     Ankle Girth: (measured in centimeters) 8-6-19  Ankle LLE   Mid malleoli 30.0   Mid foot 26.5   MT heads 26.0   Mid calf  43.0         Ankle Girth: (measured in centimeters) 7-8-19  Ankle LLE   Mid malleoli 30.6   Mid foot 28.0   MT heads 26.8   Mid calf  43.8      Ankle Girth: (measured in centimeters)  initial eval  Ankle RLE LLE   Mid malleoli 28.4 30.5   Mid foot 27.8 28.5   MT heads 27.2 27.2   Mid calf  45.7 44.2      Ankle ROM: (measured in degrees) 7-23-19  Ankle LLE   Dorsiflexion with knees straight 15   Dorsiflexion with knees bent 15   Plantarflexion 45   Inversion 30    Eversion 20         Ankle ROM: (measured in degrees) initial eval  Ankle RLE LLE   Dorsiflexion with knees straight 10 10   Dorsiflexion with knees bent 15 10   Plantarflexion 45 30   Inversion 30 25   Eversion 20 15       Flexibility testing:  - hamstrings:     90/90 test R 20 L 20 at IE   R 20 L 25              Muscle Strength 8-6-19  MMT L   Toe flexors 5/5   Toe extensors 5/5   Ankle dorsiflexion 5/5   Ankle plantar flexion unable to single leg heel raise 4-/5   Ankle inversion 5/5   Ankle eversion 5/5             Muscle Strength initial eval  MMT R L   Hip flexion 5/5 5/5   Hip abduction 5/5 5/5   Hip extension 5/5 5/5   Toe flexors 5/5 5/5   Toe extensors 5/5 5/5   Knee extension 5/5 5/5   Knee flexion 5/5 5/5   Ankle dorsiflexion 5/5 N/T   Ankle plantar flexion 5/5 N/T   Ankle inversion 5/5 N/T   Ankle eversion 5/5 N/T      Palpation: no TTP     Joint mobility: good mobility at IE forefoot mid foot and toes, mod decreased mobility       Outcome measures:    FOTO ankle disability index score: 43  PT reviewed FOTO scores for Samir Muñoz 66  FOTO scores were entered into EPIC - see media section        TREATMENT  Therapeutic exercise: Samir received therapeutic exercises to develop strength, endurance, ROM, flexibility and core stabilization for 25 minutes including:     Ankle theraband all planes x 20 with Black TB emphasis on eccentric also  HSS sitting on table x 10  (NP) due to good flexibility GSS with strap x 10  Heel raises sitting pain free zone pt performing X 30 with 10#  Emphasis on pain free ROM       minisquat x 20  Single leg balance x 30 sec x 2    20 B heel raises 50% R 50% L, improved form emphasis on eccentric Still decreased tone as compared to R but improving  Pt unable to single leg heel raise and instructed pt in need to continue to work on B heel raises as part of HEP      Storify board level 3 x 10 balance 1 min  Foam walk x 3 min    Pt able to climb stairs reciprocally well  "with no pain  Little tight feeling after ex did a few reps of GSS standing and emphasis on gently stretching        Instructed pt to continue to elevate leg at end of day 20-30 min        Manual therapy: Samir  received the following manual therapy techniques x 5 min. to include soft tissue and joint mobilization were applied to the: toes, forefoot and mid foot to include: gentle mob   (NP)Pt received tool-assisted massage with manual therapy techniques to R HS to trigger an inflammatory healing response and stimulate the production of new collagen and proper, more functional, less painful healing.    (NP)Vacuum/cupping STM with manual therapy techniques was performed to R HS to decrease muscle tightness, increase circulation and promote healing process.  The pt's skin was monitored for redness adjusting pressure as needed. The pt was instructed in possible side effects of bruising and/or soreness.        (NP) due to painting in AM and will sweat it off2 16" fans with kinesiotape was provided to the knee to decrease swelling .  Instructed pt in use, care and precautions with tape.       Modalities: Pt. received cold pack x 10 min. to L ankle elevated following treatment.       Written Home Exercises Provided: yes.  Exercises were reviewed and Marvin was able to demonstrate them prior to the end of the session.  Marvin demonstrated good  understanding of the education provided.     See EMR under Patient Instructions for exercises provided today.      Pt. education:  · Posture reeducation, body mechanics, HEP,   · No spiritual or educational barriers to learning provided  · Pt has no cultural, educational or language barriers to learning provided.    Assessment   Patient demonstrates improvement in range of motion, strength, stabilization and function.    Pt is still limited with single leg heel raise, but with continued work on HEP should further improve   Pt continues with knee pain and worked with pt on some obvious " issues to help with knee pain such as tightness in HS in order to be able to progress with CKC work for L ankle.      Pt may benefit from further evaluation and treatment to R knee if you feel indicated.    Marvin is progressing well towards his goals.   Pt prognosis is Good.       Pt will continue to benefit from skilled outpatient physical therapy to address the remaining functional deficits, provide pt/family education, and to maximize pt's level of independence in the home and community environment. .     GOALS:   Short Term Goals:  3 weeks MET FOR ANKLE  Increase range of motion 25%  Increase strength 1/2 muscle grade  Be able to perform HEP with minimal cueing required     Long Term Goals: 6 weeks  Increase range of motion to 75% to 100% full  MET FOR ANKLE  Improve muscle strength 1 muscle grade MET FOR ANKLE  Improve muscle strength with MMT to 4+/5 to 5/5 90% improved  Restore ability to ambulate with normal pain free gait for ankle normal gait  Walking for ADL and exercise will be restored without increased pain for ankle normal gait  Restore ability to stand for ADL without increased pain MET FOR ANKLE  Restore normal sleep habits without disturbances due to pain MET FOR ANKLE  Restore ability to perform ADL's and household activities independently and without increased pain MET FOR ANKLE    Anticipated barriers to physical therapy: right knee pain  Pt's spiritual, cultural and educational needs considered and pt agreeable to plan of care and goals        Plan   If you concur, I recommend patient continue with physical therapy with focus on R knee pain.  Pt should be ready for discharge for ankle rehab with focus on continuation of HEP  Please advise us of your  recommendations. Thank you for allowing us to assist in the care of your patient.      Rae Gibson, MS, PT          I certify the need for these services furnished under this plan of treatment and while under my  care.    ____________________________________ Physician/Referring Practitioner                                Date of Signature           Rae Gisbon, PT

## 2019-08-06 NOTE — PROGRESS NOTES
Physical Therapy Progress Note     Name: Samir Muñoz  Clinic Number: [id number]  Diagnosis:   Encounter Diagnoses   Name Primary?    Chronic pain of left ankle Yes    Difficulty walking     Muscle weakness     Decreased range of motion of left ankle      Physician: Azar Michelle MD    Treatment Orders: PT Eval and Treat    Past Medical History:   Diagnosis Date    Cataract     OD    GERD (gastroesophageal reflux disease)     Hyperlipidemia     Hyperlipidemia     Shingles     involved eye    Sleep apnea     resolved with UPPP     Medical Diagnosis: Achilles tendon rupture     Precautions: precautions for Achilles, R knee pain  Evaluation date: 6/25/2019  Date of Injury:  5-17-19  Visit # authorized: 7/16  Authorization period: 10-17-19  Plan of care expiration: 8-6-19  MD Follow up appt: 8-8-19       Time In:  4:03  Time Out:  5:15  Total Billable Time:  30 min    Subjective     Pt reports: ankle feeling good, knee still with pain  Pt reports not limited with weight bearing and walking activities due to ankle, but is having knee pain  Pt was compliant with home exercise program.  Response to previous treatment: felt fine      Location: left ankle    Pain Scale: before treatment: 0 in ankle  to start; after treatment: and 0 in ankle     Objective     12 weeks post injury on 8-9-19    Calf tone needs improvement     Ankle Girth: (measured in centimeters) 8-6-19  Ankle LLE   Mid malleoli 30.0   Mid foot 26.5   MT heads 26.0   Mid calf  43.0         Ankle Girth: (measured in centimeters) 7-8-19  Ankle LLE   Mid malleoli 30.6   Mid foot 28.0   MT heads 26.8   Mid calf  43.8      Ankle Girth: (measured in centimeters)  initial eval  Ankle RLE LLE   Mid malleoli 28.4 30.5   Mid foot 27.8 28.5   MT heads 27.2 27.2   Mid calf  45.7 44.2      Ankle ROM: (measured in degrees) 7-23-19  Ankle LLE   Dorsiflexion with knees straight 15   Dorsiflexion with knees bent 15   Plantarflexion 45   Inversion 30    Eversion 20         Ankle ROM: (measured in degrees) initial eval  Ankle RLE LLE   Dorsiflexion with knees straight 10 10   Dorsiflexion with knees bent 15 10   Plantarflexion 45 30   Inversion 30 25   Eversion 20 15       Flexibility testing:  - hamstrings:     90/90 test R 20 L 20 at IE   R 20 L 25              Muscle Strength 8-6-19  MMT L   Toe flexors 5/5   Toe extensors 5/5   Ankle dorsiflexion 5/5   Ankle plantar flexion unable to single leg heel raise 4-/5   Ankle inversion 5/5   Ankle eversion 5/5             Muscle Strength initial eval  MMT R L   Hip flexion 5/5 5/5   Hip abduction 5/5 5/5   Hip extension 5/5 5/5   Toe flexors 5/5 5/5   Toe extensors 5/5 5/5   Knee extension 5/5 5/5   Knee flexion 5/5 5/5   Ankle dorsiflexion 5/5 N/T   Ankle plantar flexion 5/5 N/T   Ankle inversion 5/5 N/T   Ankle eversion 5/5 N/T      Palpation: no TTP     Joint mobility: good mobility at IE forefoot mid foot and toes, mod decreased mobility       Outcome measures:    FOTO ankle disability index score: 43  PT reviewed FOTO scores for Samir Muñoz 66  FOTO scores were entered into EPIC - see media section        TREATMENT  Therapeutic exercise: Samir received therapeutic exercises to develop strength, endurance, ROM, flexibility and core stabilization for 25 minutes including:     Ankle theraband all planes x 20 with Black TB emphasis on eccentric also  HSS sitting on table x 10  (NP) due to good flexibility GSS with strap x 10  Heel raises sitting pain free zone pt performing X 30 with 10#  Emphasis on pain free ROM       minisquat x 20  Single leg balance x 30 sec x 2    20 B heel raises 50% R 50% L, improved form emphasis on eccentric Still decreased tone as compared to R but improving  Pt unable to single leg heel raise and instructed pt in need to continue to work on B heel raises as part of HEP      TheTakes board level 3 x 10 balance 1 min  Foam walk x 3 min    Pt able to climb stairs reciprocally well  "with no pain  Little tight feeling after ex did a few reps of GSS standing and emphasis on gently stretching        Instructed pt to continue to elevate leg at end of day 20-30 min        Manual therapy: Samir  received the following manual therapy techniques x 5 min. to include soft tissue and joint mobilization were applied to the: toes, forefoot and mid foot to include: gentle mob   (NP)Pt received tool-assisted massage with manual therapy techniques to R HS to trigger an inflammatory healing response and stimulate the production of new collagen and proper, more functional, less painful healing.    (NP)Vacuum/cupping STM with manual therapy techniques was performed to R HS to decrease muscle tightness, increase circulation and promote healing process.  The pt's skin was monitored for redness adjusting pressure as needed. The pt was instructed in possible side effects of bruising and/or soreness.        (NP) due to painting in AM and will sweat it off2 16" fans with kinesiotape was provided to the knee to decrease swelling .  Instructed pt in use, care and precautions with tape.       Modalities: Pt. received cold pack x 10 min. to L ankle elevated following treatment.       Written Home Exercises Provided: yes.  Exercises were reviewed and Marvin was able to demonstrate them prior to the end of the session.  Amrvin demonstrated good  understanding of the education provided.     See EMR under Patient Instructions for exercises provided today.      Pt. education:  · Posture reeducation, body mechanics, HEP,   · No spiritual or educational barriers to learning provided  · Pt has no cultural, educational or language barriers to learning provided.    Assessment   Patient demonstrates improvement in range of motion, strength, stabilization and function.    Pt is still limited with single leg heel raise, but with continued work on HEP should further improve   Pt continues with knee pain and worked with pt on some obvious " issues to help with knee pain such as tightness in HS in order to be able to progress with CKC work for L ankle.      Pt may benefit from further evaluation and treatment to R knee if you feel indicated.    Marvin is progressing well towards his goals.   Pt prognosis is Good.       Pt will continue to benefit from skilled outpatient physical therapy to address the remaining functional deficits, provide pt/family education, and to maximize pt's level of independence in the home and community environment. .     GOALS:   Short Term Goals:  3 weeks MET FOR ANKLE  Increase range of motion 25%  Increase strength 1/2 muscle grade  Be able to perform HEP with minimal cueing required     Long Term Goals: 6 weeks  Increase range of motion to 75% to 100% full  MET FOR ANKLE  Improve muscle strength 1 muscle grade MET FOR ANKLE  Improve muscle strength with MMT to 4+/5 to 5/5 90% improved  Restore ability to ambulate with normal pain free gait for ankle normal gait  Walking for ADL and exercise will be restored without increased pain for ankle normal gait  Restore ability to stand for ADL without increased pain MET FOR ANKLE  Restore normal sleep habits without disturbances due to pain MET FOR ANKLE  Restore ability to perform ADL's and household activities independently and without increased pain MET FOR ANKLE    Anticipated barriers to physical therapy: right knee pain  Pt's spiritual, cultural and educational needs considered and pt agreeable to plan of care and goals        Plan   If you concur, I recommend patient continue with physical therapy with focus on R knee pain.  Pt should be ready for discharge for ankle rehab with focus on continuation of HEP  Please advise us of your  recommendations. Thank you for allowing us to assist in the care of your patient.      Rae Gibson, MS, PT          I certify the need for these services furnished under this plan of treatment and while under my  care.    ____________________________________ Physician/Referring Practitioner                                Date of Signature           Rae Gibson, PT

## 2019-08-08 ENCOUNTER — PATIENT MESSAGE (OUTPATIENT)
Dept: ORTHOPEDICS | Facility: CLINIC | Age: 59
End: 2019-08-08

## 2019-08-08 ENCOUNTER — OFFICE VISIT (OUTPATIENT)
Dept: ORTHOPEDICS | Facility: CLINIC | Age: 59
End: 2019-08-08
Payer: OTHER GOVERNMENT

## 2019-08-08 VITALS — HEIGHT: 74 IN | WEIGHT: 250 LBS | BODY MASS INDEX: 32.08 KG/M2

## 2019-08-08 DIAGNOSIS — S86.012D ACHILLES TENDON RUPTURE, LEFT, SUBSEQUENT ENCOUNTER: Primary | ICD-10-CM

## 2019-08-08 PROCEDURE — 99999 PR PBB SHADOW E&M-EST. PATIENT-LVL II: CPT | Mod: PBBFAC,,, | Performed by: ORTHOPAEDIC SURGERY

## 2019-08-08 PROCEDURE — 99999 PR PBB SHADOW E&M-EST. PATIENT-LVL II: ICD-10-PCS | Mod: PBBFAC,,, | Performed by: ORTHOPAEDIC SURGERY

## 2019-08-08 PROCEDURE — 99212 OFFICE O/P EST SF 10 MIN: CPT | Mod: PBBFAC,PN | Performed by: ORTHOPAEDIC SURGERY

## 2019-08-08 PROCEDURE — 99213 PR OFFICE/OUTPT VISIT, EST, LEVL III, 20-29 MIN: ICD-10-PCS | Mod: S$PBB,,, | Performed by: ORTHOPAEDIC SURGERY

## 2019-08-08 PROCEDURE — 99213 OFFICE O/P EST LOW 20 MIN: CPT | Mod: S$PBB,,, | Performed by: ORTHOPAEDIC SURGERY

## 2019-08-08 NOTE — PROGRESS NOTES
58 years old, three months out from Achilles tendon injury, treated   nonoperatively, doing well, nontender.  Good motion and minimal limp.    ASSESSMENT:  Achilles tendon rupture, healing, three months out.    PLAN:  Continue with strengthening exercises.  Follow up in three months' time.        NATHANAEL/JOSÉ MIGUEL  dd: 08/08/2019 15:56:46 (CDT)  td: 08/09/2019 10:01:51 (CDT)  Doc ID   #[id number]  Job ID #[id number]    CC:

## 2019-08-16 ENCOUNTER — DOCUMENTATION ONLY (OUTPATIENT)
Dept: REHABILITATION | Facility: HOSPITAL | Age: 59
End: 2019-08-16

## 2019-08-16 ENCOUNTER — TELEPHONE (OUTPATIENT)
Dept: REHABILITATION | Facility: HOSPITAL | Age: 59
End: 2019-08-16

## 2019-08-16 DIAGNOSIS — M25.672 DECREASED RANGE OF MOTION OF LEFT ANKLE: ICD-10-CM

## 2019-08-16 DIAGNOSIS — G89.29 CHRONIC PAIN OF LEFT ANKLE: Primary | ICD-10-CM

## 2019-08-16 DIAGNOSIS — M62.81 MUSCLE WEAKNESS: ICD-10-CM

## 2019-08-16 DIAGNOSIS — R26.2 DIFFICULTY WALKING: ICD-10-CM

## 2019-08-16 DIAGNOSIS — M25.572 CHRONIC PAIN OF LEFT ANKLE: Primary | ICD-10-CM

## 2019-08-16 NOTE — PROGRESS NOTES
Physical Therapy Discharge Note      Name: Samir Muñoz  Clinic Number: [id number]  Diagnosis:        Encounter Diagnoses   Name Primary?    Chronic pain of left ankle Yes    Difficulty walking      Muscle weakness      Decreased range of motion of left ankle        Physician: Azar Michelle MD     Treatment Orders: PT Eval and Treat          Past Medical History:   Diagnosis Date    Cataract       OD    GERD (gastroesophageal reflux disease)      Hyperlipidemia      Hyperlipidemia      Shingles       involved eye    Sleep apnea       resolved with UPPP      Medical Diagnosis: Achilles tendon rupture     Precautions: precautions for Achilles, R knee pain  Evaluation date: 6/25/2019  Date of Injury:  5-17-19  Visit # authorized: 7/16  Authorization period: 10-17-19  Plan of care expiration: 8-6-19  MD Follow up appt: 8-8-19        Subjective      Pt reports: ankle feeling good, knee still with pain  Pt reports not limited with weight bearing and walking activities due to ankle, but is having knee pain  Pt was compliant with home exercise program.  Response to previous treatment: felt fine        Location: left ankle     Pain Scale: before treatment: 0 in ankle  to start; after treatment: and 0 in ankle      Objective      12 weeks post injury on 8-9-19     Calf tone needs improvement      Ankle Girth: (measured in centimeters) 8-6-19  Ankle LLE   Mid malleoli 30.0   Mid foot 26.5   MT heads 26.0   Mid calf  43.0            Ankle Girth: (measured in centimeters) 7-8-19  Ankle LLE   Mid malleoli 30.6   Mid foot 28.0   MT heads 26.8   Mid calf  43.8      Ankle Girth: (measured in centimeters)  initial eval  Ankle RLE LLE   Mid malleoli 28.4 30.5   Mid foot 27.8 28.5   MT heads 27.2 27.2   Mid calf  45.7 44.2      Ankle ROM: (measured in degrees) 7-23-19  Ankle LLE   Dorsiflexion with knees straight 15   Dorsiflexion with knees bent 15   Plantarflexion 45   Inversion 30   Eversion 20            Ankle  ROM: (measured in degrees) initial eval  Ankle RLE LLE   Dorsiflexion with knees straight 10 10   Dorsiflexion with knees bent 15 10   Plantarflexion 45 30   Inversion 30 25   Eversion 20 15         Flexibility testing:  - hamstrings:     90/90 test R 20 L 20 at IE   R 20 L 25              Muscle Strength 8-6-19  MMT L   Toe flexors 5/5   Toe extensors 5/5   Ankle dorsiflexion 5/5   Ankle plantar flexion unable to single leg heel raise 4-/5   Ankle inversion 5/5   Ankle eversion 5/5               Muscle Strength initial eval  MMT R L   Hip flexion 5/5 5/5   Hip abduction 5/5 5/5   Hip extension 5/5 5/5   Toe flexors 5/5 5/5   Toe extensors 5/5 5/5   Knee extension 5/5 5/5   Knee flexion 5/5 5/5   Ankle dorsiflexion 5/5 N/T   Ankle plantar flexion 5/5 N/T   Ankle inversion 5/5 N/T   Ankle eversion 5/5 N/T      Palpation: no TTP     Joint mobility: good mobility at IE forefoot mid foot and toes, mod decreased mobility        Outcome measures:    FOTO ankle disability index score:64 at IE  43  PT reviewed FOTO scores for Samir Muñoz   FOTO scores were entered into EPIC - see media section           TREATMENT  Therapeutic exercise: Samir received therapeutic exercises to develop strength, endurance, ROM, flexibility and core stabilization for 25 minutes including:      Ankle theraband all planes x 20 with Black TB emphasis on eccentric also  HSS sitting on table x 10  (NP) due to good flexibility GSS with strap x 10  Heel raises sitting pain free zone pt performing X 30 with 10#  Emphasis on pain free ROM         minisquat x 20  Single leg balance x 30 sec x 2     20 B heel raises 50% R 50% L, improved form emphasis on eccentric Still decreased tone as compared to R but improving  Pt unable to single leg heel raise and instructed pt in need to continue to work on B heel raises as part of HEP        [employer] board level 3 x 10 balance 1 min  Foam walk x 3 min     Pt able to climb stairs reciprocally well with no  "pain  Little tight feeling after ex did a few reps of GSS standing and emphasis on gently stretching                    Instructed pt to continue to elevate leg at end of day 20-30 min         Manual therapy: Samir  received the following manual therapy techniques x 5 min. to include soft tissue and joint mobilization were applied to the: toes, forefoot and mid foot to include: gentle mob   (NP)Pt received tool-assisted massage with manual therapy techniques to R HS to trigger an inflammatory healing response and stimulate the production of new collagen and proper, more functional, less painful healing.     (NP)Vacuum/cupping STM with manual therapy techniques was performed to R HS to decrease muscle tightness, increase circulation and promote healing process.  The pt's skin was monitored for redness adjusting pressure as needed. The pt was instructed in possible side effects of bruising and/or soreness.          (NP) due to painting in AM and will sweat it off2 16" fans with kinesiotape was provided to the knee to decrease swelling .  Instructed pt in use, care and precautions with tape.       Modalities: Pt. received cold pack x 10 min. to L ankle elevated following treatment.        Written Home Exercises Provided: yes.  Exercises were reviewed and Marvin was able to demonstrate them prior to the end of the session.  Marvin demonstrated good  understanding of the education provided.      See EMR under Patient Instructions for exercises provided today.        Pt. education:  · Posture reeducation, body mechanics, HEP,   · No spiritual or educational barriers to learning provided  · Pt has no cultural, educational or language barriers to learning provided.     Assessment   Patient demonstrates improvement in range of motion, strength, stabilization and function.    Pt to continue with IHEP     GOALS:   Short Term Goals:  3 weeks MET FOR ANKLE  Increase range of motion 25%  Increase strength 1/2 muscle grade  Be able to " perform HEP with minimal cueing required     Long Term Goals: 6 weeks  Increase range of motion to 75% to 100% full  MET FOR ANKLE  Improve muscle strength 1 muscle grade MET FOR ANKLE  Improve muscle strength with MMT to 4+/5 to 5/5 90% improved  Restore ability to ambulate with normal pain free gait for ankle normal gait  Walking for ADL and exercise will be restored without increased pain for ankle normal gait  Restore ability to stand for ADL without increased pain MET FOR ANKLE  Restore normal sleep habits without disturbances due to pain MET FOR ANKLE  Restore ability to perform ADL's and household activities independently and without increased pain MET FOR ANKLE          Plan   Patient is discharged from physical therapy after fully achieving the established goals for his ankle  Thank you for allowing us to assist in the care of your patient.

## 2019-08-26 ENCOUNTER — PATIENT MESSAGE (OUTPATIENT)
Dept: OPHTHALMOLOGY | Facility: CLINIC | Age: 59
End: 2019-08-26

## 2019-09-03 ENCOUNTER — OFFICE VISIT (OUTPATIENT)
Dept: OPHTHALMOLOGY | Facility: CLINIC | Age: 59
End: 2019-09-03
Payer: OTHER GOVERNMENT

## 2019-09-03 DIAGNOSIS — H52.7 REFRACTIVE ERROR: ICD-10-CM

## 2019-09-03 DIAGNOSIS — H25.11 NUCLEAR SCLEROSIS, RIGHT: ICD-10-CM

## 2019-09-03 DIAGNOSIS — Z86.19 HISTORY OF HERPES ZOSTER: ICD-10-CM

## 2019-09-03 DIAGNOSIS — H26.492 POSTERIOR CAPSULAR OPACIFICATION, LEFT: Primary | ICD-10-CM

## 2019-09-03 PROCEDURE — 92014 COMPRE OPH EXAM EST PT 1/>: CPT | Mod: S$PBB,,, | Performed by: OPHTHALMOLOGY

## 2019-09-03 PROCEDURE — 92014 PR EYE EXAM, EST PATIENT,COMPREHESV: ICD-10-PCS | Mod: S$PBB,,, | Performed by: OPHTHALMOLOGY

## 2019-09-03 PROCEDURE — 99999 PR PBB SHADOW E&M-EST. PATIENT-LVL III: CPT | Mod: PBBFAC,,, | Performed by: OPHTHALMOLOGY

## 2019-09-03 PROCEDURE — 99213 OFFICE O/P EST LOW 20 MIN: CPT | Mod: PBBFAC,PO | Performed by: OPHTHALMOLOGY

## 2019-09-03 PROCEDURE — 99999 PR PBB SHADOW E&M-EST. PATIENT-LVL III: ICD-10-PCS | Mod: PBBFAC,,, | Performed by: OPHTHALMOLOGY

## 2019-09-03 NOTE — PROGRESS NOTES
HPI     57 YO male presents today for an annual eye exam. He states that he is   noticing trouble with OS, halos around lights and cloudy vision throughout   the day.     OTC allergy drops OU QAM     Last edited by Virginia Elizabeth, PCT on 9/3/2019  3:51 PM. (History)            Assessment /Plan     For exam results, see Encounter Report.    Posterior capsular opacification, left    Nuclear sclerosis, right    History of herpes zoster    Refractive error          Likely the cause of symptoms. RTC on a procedure day to check undilated, may need YAG capsulotomy OS.     OD doing fine. S/p CE OS 10/25/17.    Stable

## 2019-09-03 NOTE — PATIENT INSTRUCTIONS
What Is YAG Capsulotomy?  YAG capsulotomy is a laser eye treatment. It is used to improve your vision after cataract surgery. Your vision can become cloudy again after cataract surgery. This is sometimes called an after cataract. But it isnt a new cataract. Instead, your posterior capsule, which holds the lens in place, becomes cloudy. Your eye doctor may use YAG capsulotomy to help you see better.      Your eye after cataract surgery  When your cataract is removed, your eyes cloudy lens is replaced with a plastic lens called an IOL (intraocular lens). The IOL is placed in the posterior capsule, which held the old cloudy lens. You can see again because the IOL lets light reach your retina. The retina is a tissue layer that lines the back of your eye.  If the capsule becomes cloudy  The posterior capsule is a thin, clear film. It can become cloudy. This can happen months or even years after cataract surgery. This may block light from reaching your retina.  Date Last Reviewed: 6/13/2015  © 5766-8276 [employer]. [address] [place] [address]. All rights reserved. This information is not intended as a substitute for professional medical care. Always follow your healthcare professional's instructions.        YAG Capsulotomy: How a YAG Laser Works    A laser is a strong beam of energy that can be focused on a tiny point. A laser can be precisely controlled. This makes it safe and reliable.  The YAG laser  For a capsulotomy, your eye doctor uses a YAG laser. The YAG laser gives off fast, tiny bursts of energy. A special contact lens may be used to help focus the laser on the right spot. The laser passes through the front of your eye. It also passes through your new IOL (intraocular lens). It doesnt harm them. When the laser reaches your posterior capsule, it makes a tiny opening. Light can then enter your eye again. Your posterior capsule is still able to hold your IOL in place.     Date  Last Reviewed: 6/13/2015  © 3341-0186 [employer]. [address] [place] [address], [place] [address]. All rights reserved. This information is not intended as a substitute for professional medical care. Always follow your healthcare professional's instructions.        YAG Capsulotomy: Your Experience    YAG capsulotomy is done in your eye doctors office or at an outpatient surgery center. The treatment is usually quick and painless. You can most often return to your normal routine right away. There are no needles or stitches. There is no risk of infection. Your vision will most likely be fully restored soon after treatment.  Possible risks  Laser treatment is safe. But all procedures have some risks. The risks of this treatment include:  · Your eye pressure may rise, usually only for a short time.  · The laser can scratch your IOL. But this almost never affects vision.  · In rare cases, the retina may become detached.   Before capsulotomy  Ask your eye doctor if you need to have someone drive you to and from your treatment. Most people see clearly again right away and go home shortly after treatment.  During capsulotomy  First, your eye is numbed and your pupil is widened (dilated) with eye drops. Then, you rest your chin on a  front of the laser machine. You may see flashes of light and hear a faint clicking sound as the laser enters your eye. But you should not feel any pain. You can help by staying relaxed and still.  After capsulotomy  You should begin to see better in a few hours. Your eye doctor may check your eye pressure later that day or the next. He or she may also give you eye drops or an ointment. Once the posterior capsule (the part of the eye that holds the lens in place) has been opened, it wont make your vision cloudy again.  Call your eye doctor right away if you have any of the following:  · Increased pain  · Sudden decrease in vision  · Increased flashing lights or floaters  · A  shadow covering your vision   Date Last Reviewed: 6/13/2015  © 4557-0886 [employer]. [address] [place] [address], [place] [address]. All rights reserved. This information is not intended as a substitute for professional medical care. Always follow your healthcare professional's instructions.        YAG Capsulotomy: Your Experience    YAG capsulotomy is done in your eye doctors office or at an outpatient surgery center. The treatment is usually quick and painless. You can most often return to your normal routine right away. There are no needles or stitches. There is no risk of infection. Your vision will most likely be fully restored soon after treatment.  Possible risks  Laser treatment is safe. But all procedures have some risks. The risks of this treatment include:  · Your eye pressure may rise, usually only for a short time.  · The laser can scratch your IOL. But this almost never affects vision.  · In rare cases, the retina may become detached.   Before capsulotomy  Ask your eye doctor if you need to have someone drive you to and from your treatment. Most people see clearly again right away and go home shortly after treatment.  During capsulotomy  First, your eye is numbed and your pupil is widened (dilated) with eye drops. Then, you rest your chin on a  front of the laser machine. You may see flashes of light and hear a faint clicking sound as the laser enters your eye. But you should not feel any pain. You can help by staying relaxed and still.  After capsulotomy  You should begin to see better in a few hours. Your eye doctor may check your eye pressure later that day or the next. He or she may also give you eye drops or an ointment. Once the posterior capsule (the part of the eye that holds the lens in place) has been opened, it wont make your vision cloudy again.  Call your eye doctor right away if you have any of the following:  · Increased pain  · Sudden decrease in vision  · Increased  flashing lights or floaters  · A shadow covering your vision   Date Last Reviewed: 6/13/2015  © 7125-9294 [employer]. [address] [place] [address], [place] [address]. All rights reserved. This information is not intended as a substitute for professional medical care. Always follow your healthcare professional's instructions.

## 2019-09-10 ENCOUNTER — PATIENT MESSAGE (OUTPATIENT)
Dept: OPHTHALMOLOGY | Facility: CLINIC | Age: 59
End: 2019-09-10

## 2019-09-12 ENCOUNTER — PROCEDURE VISIT (OUTPATIENT)
Dept: OPHTHALMOLOGY | Facility: CLINIC | Age: 59
End: 2019-09-12
Payer: OTHER GOVERNMENT

## 2019-09-12 DIAGNOSIS — H26.492 POSTERIOR CAPSULAR OPACIFICATION, LEFT: Primary | ICD-10-CM

## 2019-09-12 PROCEDURE — 92012 PR EYE EXAM, EST PATIENT,INTERMED: ICD-10-PCS | Mod: S$PBB,57,, | Performed by: OPHTHALMOLOGY

## 2019-09-12 PROCEDURE — 66821 YAG CAPSULOTOMY - OS - LEFT EYE: ICD-10-PCS | Mod: S$PBB,LT,, | Performed by: OPHTHALMOLOGY

## 2019-09-12 PROCEDURE — 66821 AFTER CATARACT LASER SURGERY: CPT | Mod: PBBFAC,PO,LT | Performed by: OPHTHALMOLOGY

## 2019-09-12 PROCEDURE — 92012 INTRM OPH EXAM EST PATIENT: CPT | Mod: S$PBB,57,, | Performed by: OPHTHALMOLOGY

## 2019-09-12 RX ORDER — PREDNISOLONE ACETATE 10 MG/ML
1 SUSPENSION/ DROPS OPHTHALMIC 4 TIMES DAILY
Qty: 1 BOTTLE | Refills: 0 | Status: SHIPPED | OUTPATIENT
Start: 2019-09-12 | End: 2019-09-17

## 2019-09-12 NOTE — PATIENT INSTRUCTIONS
What Is YAG Capsulotomy?  YAG capsulotomy is a laser eye treatment. It is used to improve your vision after cataract surgery. Your vision can become cloudy again after cataract surgery. This is sometimes called an after cataract. But it isnt a new cataract. Instead, your posterior capsule, which holds the lens in place, becomes cloudy. Your eye doctor may use YAG capsulotomy to help you see better.      Your eye after cataract surgery  When your cataract is removed, your eyes cloudy lens is replaced with a plastic lens called an IOL (intraocular lens). The IOL is placed in the posterior capsule, which held the old cloudy lens. You can see again because the IOL lets light reach your retina. The retina is a tissue layer that lines the back of your eye.  If the capsule becomes cloudy  The posterior capsule is a thin, clear film. It can become cloudy. This can happen months or even years after cataract surgery. This may block light from reaching your retina.  Date Last Reviewed: 6/13/2015  © 3128-6889 [employer]. [address] [place] [address]. All rights reserved. This information is not intended as a substitute for professional medical care. Always follow your healthcare professional's instructions.        YAG Capsulotomy: How a YAG Laser Works    A laser is a strong beam of energy that can be focused on a tiny point. A laser can be precisely controlled. This makes it safe and reliable.  The YAG laser  For a capsulotomy, your eye doctor uses a YAG laser. The YAG laser gives off fast, tiny bursts of energy. A special contact lens may be used to help focus the laser on the right spot. The laser passes through the front of your eye. It also passes through your new IOL (intraocular lens). It doesnt harm them. When the laser reaches your posterior capsule, it makes a tiny opening. Light can then enter your eye again. Your posterior capsule is still able to hold your IOL in place.     Date  Last Reviewed: 6/13/2015  © 2942-5350 [employer]. [address] [place] [address], [place] [address]. All rights reserved. This information is not intended as a substitute for professional medical care. Always follow your healthcare professional's instructions.        YAG Capsulotomy: Your Experience    YAG capsulotomy is done in your eye doctors office or at an outpatient surgery center. The treatment is usually quick and painless. You can most often return to your normal routine right away. There are no needles or stitches. There is no risk of infection. Your vision will most likely be fully restored soon after treatment.  Possible risks  Laser treatment is safe. But all procedures have some risks. The risks of this treatment include:  · Your eye pressure may rise, usually only for a short time.  · The laser can scratch your IOL. But this almost never affects vision.  · In rare cases, the retina may become detached.   Before capsulotomy  Ask your eye doctor if you need to have someone drive you to and from your treatment. Most people see clearly again right away and go home shortly after treatment.  During capsulotomy  First, your eye is numbed and your pupil is widened (dilated) with eye drops. Then, you rest your chin on a  front of the laser machine. You may see flashes of light and hear a faint clicking sound as the laser enters your eye. But you should not feel any pain. You can help by staying relaxed and still.  After capsulotomy  You should begin to see better in a few hours. Your eye doctor may check your eye pressure later that day or the next. He or she may also give you eye drops or an ointment. Once the posterior capsule (the part of the eye that holds the lens in place) has been opened, it wont make your vision cloudy again.  Call your eye doctor right away if you have any of the following:  · Increased pain  · Sudden decrease in vision  · Increased flashing lights or floaters  · A  shadow covering your vision   Date Last Reviewed: 6/13/2015  © 1717-2403 [employer]. [address] [place] [address], [place] [address]. All rights reserved. This information is not intended as a substitute for professional medical care. Always follow your healthcare professional's instructions.        YAG Capsulotomy: Your Experience    YAG capsulotomy is done in your eye doctors office or at an outpatient surgery center. The treatment is usually quick and painless. You can most often return to your normal routine right away. There are no needles or stitches. There is no risk of infection. Your vision will most likely be fully restored soon after treatment.  Possible risks  Laser treatment is safe. But all procedures have some risks. The risks of this treatment include:  · Your eye pressure may rise, usually only for a short time.  · The laser can scratch your IOL. But this almost never affects vision.  · In rare cases, the retina may become detached.   Before capsulotomy  Ask your eye doctor if you need to have someone drive you to and from your treatment. Most people see clearly again right away and go home shortly after treatment.  During capsulotomy  First, your eye is numbed and your pupil is widened (dilated) with eye drops. Then, you rest your chin on a  front of the laser machine. You may see flashes of light and hear a faint clicking sound as the laser enters your eye. But you should not feel any pain. You can help by staying relaxed and still.  After capsulotomy  You should begin to see better in a few hours. Your eye doctor may check your eye pressure later that day or the next. He or she may also give you eye drops or an ointment. Once the posterior capsule (the part of the eye that holds the lens in place) has been opened, it wont make your vision cloudy again.  Call your eye doctor right away if you have any of the following:  · Increased pain  · Sudden decrease in vision  · Increased  flashing lights or floaters  · A shadow covering your vision   Date Last Reviewed: 6/13/2015  © 9700-1597 [employer]. [address] [place] [address], [place] [address]. All rights reserved. This information is not intended as a substitute for professional medical care. Always follow your healthcare professional's instructions.

## 2019-09-12 NOTE — PROGRESS NOTES
HPI     59 YO male presents today for a possible YAG CAP OS. He states he is   doing well, no changes since last visit.     Last edited by Virginia Elizabeth PCT on 9/12/2019  2:02 PM. (History)            Assessment /Plan     For exam results, see Encounter Report.    Posterior capsular opacification, left  -     Yag Capsulotomy - OS - Left Eye; Future  -     prednisoLONE acetate (PRED FORTE) 1 % DrpS; Place 1 drop into the left eye 4 (four) times daily. for 5 days  Dispense: 1 Bottle; Refill: 0        YAG capsulotomy done today OS 9/12/19. Prednisolone QID x 5 days then d/c. RTC 1 week IOP check.

## 2019-09-23 ENCOUNTER — TELEPHONE (OUTPATIENT)
Dept: OPHTHALMOLOGY | Facility: CLINIC | Age: 59
End: 2019-09-23

## 2019-10-15 ENCOUNTER — OFFICE VISIT (OUTPATIENT)
Dept: OPHTHALMOLOGY | Facility: CLINIC | Age: 59
End: 2019-10-15
Payer: OTHER GOVERNMENT

## 2019-10-15 DIAGNOSIS — Z98.890 POST-OPERATIVE STATE: Primary | ICD-10-CM

## 2019-10-15 DIAGNOSIS — H25.11 NUCLEAR SCLEROSIS, RIGHT: ICD-10-CM

## 2019-10-15 DIAGNOSIS — H57.12 EYE PAIN, LEFT: ICD-10-CM

## 2019-10-15 DIAGNOSIS — Z86.19 HISTORY OF HERPES ZOSTER: ICD-10-CM

## 2019-10-15 PROCEDURE — 99213 OFFICE O/P EST LOW 20 MIN: CPT | Mod: PBBFAC,PO | Performed by: OPHTHALMOLOGY

## 2019-10-15 PROCEDURE — 99024 PR POST-OP FOLLOW-UP VISIT: ICD-10-PCS | Mod: ,,, | Performed by: OPHTHALMOLOGY

## 2019-10-15 PROCEDURE — 99999 PR PBB SHADOW E&M-EST. PATIENT-LVL III: CPT | Mod: PBBFAC,,, | Performed by: OPHTHALMOLOGY

## 2019-10-15 PROCEDURE — 99024 POSTOP FOLLOW-UP VISIT: CPT | Mod: ,,, | Performed by: OPHTHALMOLOGY

## 2019-10-15 PROCEDURE — 99999 PR PBB SHADOW E&M-EST. PATIENT-LVL III: ICD-10-PCS | Mod: PBBFAC,,, | Performed by: OPHTHALMOLOGY

## 2019-10-15 NOTE — PROGRESS NOTES
HPI     DLS: 9/12/19    Pt states about 2 weeks after having YAG cap OS he started having pain,   crusting, blurry VA off and on OS. Pt states he started noticing these   symptoms after stopping the prednisolone gtts. Was using allergy gtts w/o   relief.  Pt states no pain in eyes today. Denies F/F.     Last edited by Shanice Whittington on 10/15/2019  3:52 PM. (History)            Assessment /Plan     For exam results, see Encounter Report.    Post-operative state    Nuclear sclerosis, right    Eye pain, left    History of herpes zoster        PT feels pain may be related to his neuralgia. Some increased conjunctival injection OS today, no cell/flare noted OS. Perhaps rebound after d/c'ing pred - will try brief taper to see if symptoms resolve. No hordeolum noted.

## 2019-10-31 ENCOUNTER — OFFICE VISIT (OUTPATIENT)
Dept: DERMATOLOGY | Facility: CLINIC | Age: 59
End: 2019-10-31
Payer: OTHER GOVERNMENT

## 2019-10-31 VITALS — HEIGHT: 74 IN | WEIGHT: 250 LBS | BODY MASS INDEX: 32.08 KG/M2

## 2019-10-31 DIAGNOSIS — D18.01 HEMANGIOMA OF SKIN: ICD-10-CM

## 2019-10-31 DIAGNOSIS — D23.9 INTRADERMAL NEVUS: ICD-10-CM

## 2019-10-31 DIAGNOSIS — L57.0 ACTINIC KERATOSIS: ICD-10-CM

## 2019-10-31 DIAGNOSIS — L73.8 SEBACEOUS HYPERPLASIA: Primary | ICD-10-CM

## 2019-10-31 PROCEDURE — 99999 PR PBB SHADOW E&M-EST. PATIENT-LVL III: ICD-10-PCS | Mod: PBBFAC,,, | Performed by: DERMATOLOGY

## 2019-10-31 PROCEDURE — 99202 PR OFFICE/OUTPT VISIT, NEW, LEVL II, 15-29 MIN: ICD-10-PCS | Mod: 25,S$PBB,, | Performed by: DERMATOLOGY

## 2019-10-31 PROCEDURE — 17000 PR DESTRUCTION(LASER SURGERY,CRYOSURGERY,CHEMOSURGERY),PREMALIGNANT LESIONS,FIRST LESION: ICD-10-PCS | Mod: S$PBB,,, | Performed by: DERMATOLOGY

## 2019-10-31 PROCEDURE — 99202 OFFICE O/P NEW SF 15 MIN: CPT | Mod: 25,S$PBB,, | Performed by: DERMATOLOGY

## 2019-10-31 PROCEDURE — 17000 DESTRUCT PREMALG LESION: CPT | Mod: S$PBB,,, | Performed by: DERMATOLOGY

## 2019-10-31 PROCEDURE — 99999 PR PBB SHADOW E&M-EST. PATIENT-LVL III: CPT | Mod: PBBFAC,,, | Performed by: DERMATOLOGY

## 2019-10-31 PROCEDURE — 17000 DESTRUCT PREMALG LESION: CPT | Mod: PBBFAC,PO | Performed by: DERMATOLOGY

## 2019-10-31 PROCEDURE — 17003 DESTRUCT PREMALG LES 2-14: CPT | Mod: S$PBB,,, | Performed by: DERMATOLOGY

## 2019-10-31 PROCEDURE — 17003 DESTRUCTION, PREMALIGNANT LESIONS; SECOND THROUGH 14 LESIONS: ICD-10-PCS | Mod: S$PBB,,, | Performed by: DERMATOLOGY

## 2019-10-31 PROCEDURE — 99213 OFFICE O/P EST LOW 20 MIN: CPT | Mod: PBBFAC,PO,25 | Performed by: DERMATOLOGY

## 2019-10-31 PROCEDURE — 17003 DESTRUCT PREMALG LES 2-14: CPT | Mod: PBBFAC,PO | Performed by: DERMATOLOGY

## 2019-10-31 NOTE — PROGRESS NOTES
Subjective:       Patient ID:  Samir Muñoz is a 59 y.o. male who presents for   Chief Complaint   Patient presents with    Rash     60 y/o M presents for initial visit for rough spots on both sides of temple x 1 year.  They are red, increasing in number. No prior tx  Also rash on skin of left nostril x 1 week, crusty feeling, 1 episode of bleeding when washed face, OTC Neosporin.  It is no longer present    Denies history of NMSC; extensive sun exposure (fishes)  Denies family history of melanoma      Past Medical History:   Diagnosis Date    Cataract     OD    GERD (gastroesophageal reflux disease)     Hyperlipidemia     Hyperlipidemia     Shingles     involved eye    Sleep apnea     resolved with UPPP     Review of Systems   Skin: Positive for wears hat. Negative for daily sunscreen use and activity-related sunscreen use.   Hematologic/Lymphatic: Does not bruise/bleed easily.   Allergic/Immunologic: Positive for environmental allergies.        Objective:    Physical Exam   Constitutional: He appears well-developed and well-nourished. No distress.   HENT:   Mouth/Throat: Lips normal.    Eyes: Lids are normal.    Neurological: He is alert and oriented to person, place, and time. He is not disoriented.   Psychiatric: He has a normal mood and affect.   Skin:   Areas Examined (abnormalities noted in diagram):   Head / Face Inspection Performed  Neck Inspection Performed  Chest / Axilla Inspection Performed  Back Inspection Performed  RUE Inspected  LUE Inspection Performed                   Diagram Legend     Erythematous scaling macule/papule c/w actinic keratosis       Vascular papule c/w angioma      Pigmented verrucoid papule/plaque c/w seborrheic keratosis      Yellow umbilicated papule c/w sebaceous hyperplasia      Irregularly shaped tan macule c/w lentigo     1-2 mm smooth white papules consistent with Milia      Movable subcutaneous cyst with punctum c/w epidermal inclusion cyst       Subcutaneous movable cyst c/w pilar cyst      Firm pink to brown papule c/w dermatofibroma      Pedunculated fleshy papule(s) c/w skin tag(s)      Evenly pigmented macule c/w junctional nevus     Mildly variegated pigmented, slightly irregular-bordered macule c/w mildly atypical nevus      Flesh colored to evenly pigmented papule c/w intradermal nevus       Pink pearly papule/plaque c/w basal cell carcinoma      Erythematous hyperkeratotic cursted plaque c/w SCC      Surgical scar with no sign of skin cancer recurrence      Open and closed comedones      Inflammatory papules and pustules      Verrucoid papule consistent consistent with wart     Erythematous eczematous patches and plaques     Dystrophic onycholytic nail with subungual debris c/w onychomycosis     Umbilicated papule    Erythematous-base heme-crusted tan verrucoid plaque consistent with inflamed seborrheic keratosis     Erythematous Silvery Scaling Plaque c/w Psoriasis     See annotation      Assessment / Plan:        Sebaceous hyperplasia  This is a common condition representing benign enlargement of the sebaceous lobule. It typically occurs in adulthood. Reassurance given to patient.     Actinic keratosis  Cryosurgery Procedure Note    Verbal consent from the patient is obtained and the patient is aware of the precancerous quality and need for treatment of these lesions. Liquid nitrogen cryosurgery is applied to the 4 actinic keratoses, as detailed in the physical exam, to produce a freeze injury. The patient is aware that blisters may form and is instructed on wound care with gentle cleansing and use of vaseline ointment to keep moist until healed. The patient is instructed to call if lesions do not completely resolve.    Hemangioma of skin  This is a benign vascular lesion. Reassurance given. No treatment required.     Intradermal nevus  Discussed ABCDE's of nevi.  Monitor for new mole or moles that are becoming bigger, darker, irritated, or  developing irregular borders. Brochure provided.           Follow up in about 1 year (around 10/31/2020).

## 2019-11-13 ENCOUNTER — PATIENT MESSAGE (OUTPATIENT)
Dept: PAIN MEDICINE | Facility: CLINIC | Age: 59
End: 2019-11-13

## 2019-11-25 ENCOUNTER — TELEPHONE (OUTPATIENT)
Dept: PAIN MEDICINE | Facility: CLINIC | Age: 59
End: 2019-11-25

## 2019-11-25 DIAGNOSIS — B02.29 POST HERPETIC NEURALGIA: Primary | ICD-10-CM

## 2019-11-25 NOTE — TELEPHONE ENCOUNTER
Please let the patient know that I recommend he see Dr. Garcia, she specializes in treatments for headaches and other facial pain and can likely help him. I discussed Botox with her and while that may be an option, getting insurance coverage may be difficult. However, she has a few ideas about moving forward. I'll place the referral.

## 2019-11-26 ENCOUNTER — TELEPHONE (OUTPATIENT)
Dept: NEUROLOGY | Facility: CLINIC | Age: 59
End: 2019-11-26

## 2019-12-17 ENCOUNTER — PATIENT MESSAGE (OUTPATIENT)
Dept: PAIN MEDICINE | Facility: CLINIC | Age: 59
End: 2019-12-17

## 2019-12-17 NOTE — TELEPHONE ENCOUNTER
Yes, I would like him to see Dr. Garcia because I think she will have a number or options to help him.

## 2020-03-27 ENCOUNTER — TELEPHONE (OUTPATIENT)
Dept: NEUROLOGY | Facility: CLINIC | Age: 60
End: 2020-03-27

## 2020-03-27 NOTE — TELEPHONE ENCOUNTER
Left voicemail cancelling appt due to COVID-19. Pt added to wait list to reschedule appt. Message sent on portal.

## 2020-05-04 ENCOUNTER — TELEPHONE (OUTPATIENT)
Dept: NEUROLOGY | Facility: CLINIC | Age: 60
End: 2020-05-04

## 2020-06-15 ENCOUNTER — PATIENT MESSAGE (OUTPATIENT)
Dept: NEUROLOGY | Facility: CLINIC | Age: 60
End: 2020-06-15

## 2020-07-01 ENCOUNTER — TELEPHONE (OUTPATIENT)
Dept: FAMILY MEDICINE | Facility: CLINIC | Age: 60
End: 2020-07-01

## 2020-07-01 ENCOUNTER — TELEPHONE (OUTPATIENT)
Dept: NEUROLOGY | Facility: CLINIC | Age: 60
End: 2020-07-01

## 2020-07-01 DIAGNOSIS — B02.29 POST HERPETIC NEURALGIA: Primary | ICD-10-CM

## 2020-07-01 NOTE — TELEPHONE ENCOUNTER
Patient requesting    Referral to Dr Eddie Ramsay for nerve damage assessment      Comments:   I need a  referral to Dr Eddie Ramsay for review of options for Shingles nerve damage procedures.     Please advise

## 2020-07-01 NOTE — TELEPHONE ENCOUNTER
Called pt and and attempted to reschedule consultation. Pt declined next available appt and dawson and Nathan numbers for sooner appointment. Pt states he just needs a referral to dermatologist and did not want to be seen for new patient appt. Advised pt to check with PCP for referral. Verbalized understanding.

## 2020-07-06 ENCOUNTER — PATIENT MESSAGE (OUTPATIENT)
Dept: FAMILY MEDICINE | Facility: CLINIC | Age: 60
End: 2020-07-06

## 2020-07-06 DIAGNOSIS — B02.29 POST HERPETIC NEURALGIA: Primary | ICD-10-CM

## 2020-07-09 NOTE — TELEPHONE ENCOUNTER
Pt req to go see Dr. Eddie Olivares for topical tx of botox for shingles. Order pended. Please sign. Thanks

## 2020-09-01 ENCOUNTER — PATIENT MESSAGE (OUTPATIENT)
Dept: FAMILY MEDICINE | Facility: CLINIC | Age: 60
End: 2020-09-01

## 2020-10-05 ENCOUNTER — PATIENT MESSAGE (OUTPATIENT)
Dept: ADMINISTRATIVE | Facility: HOSPITAL | Age: 60
End: 2020-10-05

## 2020-11-12 ENCOUNTER — OFFICE VISIT (OUTPATIENT)
Dept: FAMILY MEDICINE | Facility: CLINIC | Age: 60
End: 2020-11-12
Payer: OTHER GOVERNMENT

## 2020-11-12 VITALS
BODY MASS INDEX: 32.23 KG/M2 | TEMPERATURE: 98 F | HEIGHT: 74 IN | DIASTOLIC BLOOD PRESSURE: 78 MMHG | WEIGHT: 251.13 LBS | OXYGEN SATURATION: 97 % | HEART RATE: 70 BPM | SYSTOLIC BLOOD PRESSURE: 118 MMHG

## 2020-11-12 DIAGNOSIS — E78.5 HYPERLIPIDEMIA, UNSPECIFIED HYPERLIPIDEMIA TYPE: ICD-10-CM

## 2020-11-12 DIAGNOSIS — Z12.11 SCREEN FOR COLON CANCER: ICD-10-CM

## 2020-11-12 DIAGNOSIS — Z00.00 WELLNESS EXAMINATION: Primary | ICD-10-CM

## 2020-11-12 PROCEDURE — 99213 OFFICE O/P EST LOW 20 MIN: CPT | Mod: PBBFAC,PO | Performed by: FAMILY MEDICINE

## 2020-11-12 PROCEDURE — 99396 PR PREVENTIVE VISIT,EST,40-64: ICD-10-PCS | Mod: S$PBB,,, | Performed by: FAMILY MEDICINE

## 2020-11-12 PROCEDURE — 99999 PR PBB SHADOW E&M-EST. PATIENT-LVL III: CPT | Mod: PBBFAC,,, | Performed by: FAMILY MEDICINE

## 2020-11-12 PROCEDURE — 99396 PREV VISIT EST AGE 40-64: CPT | Mod: S$PBB,,, | Performed by: FAMILY MEDICINE

## 2020-11-12 PROCEDURE — 99999 PR PBB SHADOW E&M-EST. PATIENT-LVL III: ICD-10-PCS | Mod: PBBFAC,,, | Performed by: FAMILY MEDICINE

## 2020-11-12 RX ORDER — MULTIVIT WITH IRON,MINERALS
TABLET,CHEWABLE ORAL
COMMUNITY
End: 2022-05-03

## 2020-11-12 NOTE — PROGRESS NOTES
Subjective:       Patient ID: Samir Muñoz is a 60 y.o. male.    Chief Complaint: Follow-up    Here for wellness and f/u issues. Due for labs.     Review of Systems   Constitutional: Negative for chills and fever.   Respiratory: Negative for cough, chest tightness and shortness of breath.    Cardiovascular: Negative for chest pain, palpitations and leg swelling.   Endocrine: Negative for cold intolerance and heat intolerance.   Psychiatric/Behavioral: Negative for decreased concentration. The patient is not nervous/anxious.        Objective:      Physical Exam  Vitals signs and nursing note reviewed.   Constitutional:       Appearance: He is well-developed.   HENT:      Head: Normocephalic and atraumatic.   Cardiovascular:      Rate and Rhythm: Normal rate and regular rhythm.      Heart sounds: Normal heart sounds.   Pulmonary:      Effort: Pulmonary effort is normal.      Breath sounds: Normal breath sounds.         Assessment:       1. Wellness examination    2. Hyperlipidemia, unspecified hyperlipidemia type    3. Screen for colon cancer        Plan:       Wellness examination  -     CBC Auto Differential; Future; Expected date: 11/12/2020  -     Comprehensive Metabolic Panel; Future; Expected date: 11/12/2020  -     Lipid Panel; Future; Expected date: 11/12/2020  -     PSA, Screening; Future; Expected date: 11/12/2020  -     TSH; Future; Expected date: 11/12/2020    Hyperlipidemia, unspecified hyperlipidemia type    Screen for colon cancer  -     Case request GI: COLONOSCOPY    update labs and health maintenance  Due for cscope  Will monitor chronic medical issues and continue current plan of care.    Follow up in about 1 year (around 11/12/2021), or if symptoms worsen or fail to improve.

## 2020-11-13 ENCOUNTER — LAB VISIT (OUTPATIENT)
Dept: LAB | Facility: HOSPITAL | Age: 60
End: 2020-11-13
Attending: FAMILY MEDICINE
Payer: OTHER GOVERNMENT

## 2020-11-13 DIAGNOSIS — Z00.00 WELLNESS EXAMINATION: ICD-10-CM

## 2020-11-13 LAB
ALBUMIN SERPL BCP-MCNC: 4.1 G/DL (ref 3.5–5.2)
ALP SERPL-CCNC: 74 U/L (ref 55–135)
ALT SERPL W/O P-5'-P-CCNC: 35 U/L (ref 10–44)
ANION GAP SERPL CALC-SCNC: 6 MMOL/L (ref 8–16)
AST SERPL-CCNC: 25 U/L (ref 10–40)
BASOPHILS # BLD AUTO: 0.02 K/UL (ref 0–0.2)
BASOPHILS NFR BLD: 0.4 % (ref 0–1.9)
BILIRUB SERPL-MCNC: 0.6 MG/DL (ref 0.1–1)
BUN SERPL-MCNC: 20 MG/DL (ref 6–20)
CALCIUM SERPL-MCNC: 9.9 MG/DL (ref 8.7–10.5)
CHLORIDE SERPL-SCNC: 102 MMOL/L (ref 95–110)
CHOLEST SERPL-MCNC: 252 MG/DL (ref 120–199)
CHOLEST/HDLC SERPL: 4.5 {RATIO} (ref 2–5)
CO2 SERPL-SCNC: 32 MMOL/L (ref 23–29)
COMPLEXED PSA SERPL-MCNC: 0.33 NG/ML (ref 0–4)
CREAT SERPL-MCNC: 1.3 MG/DL (ref 0.5–1.4)
DIFFERENTIAL METHOD: NORMAL
EOSINOPHIL # BLD AUTO: 0.2 K/UL (ref 0–0.5)
EOSINOPHIL NFR BLD: 3.8 % (ref 0–8)
ERYTHROCYTE [DISTWIDTH] IN BLOOD BY AUTOMATED COUNT: 13.1 % (ref 11.5–14.5)
EST. GFR  (AFRICAN AMERICAN): >60 ML/MIN/1.73 M^2
EST. GFR  (NON AFRICAN AMERICAN): >60 ML/MIN/1.73 M^2
GLUCOSE SERPL-MCNC: 99 MG/DL (ref 70–110)
HCT VFR BLD AUTO: 47.9 % (ref 40–54)
HDLC SERPL-MCNC: 56 MG/DL (ref 40–75)
HDLC SERPL: 22.2 % (ref 20–50)
HGB BLD-MCNC: 15.8 G/DL (ref 14–18)
IMM GRANULOCYTES # BLD AUTO: 0.02 K/UL (ref 0–0.04)
IMM GRANULOCYTES NFR BLD AUTO: 0.4 % (ref 0–0.5)
LDLC SERPL CALC-MCNC: 151.4 MG/DL (ref 63–159)
LYMPHOCYTES # BLD AUTO: 1.8 K/UL (ref 1–4.8)
LYMPHOCYTES NFR BLD: 37.1 % (ref 18–48)
MCH RBC QN AUTO: 29.5 PG (ref 27–31)
MCHC RBC AUTO-ENTMCNC: 33 G/DL (ref 32–36)
MCV RBC AUTO: 89 FL (ref 82–98)
MONOCYTES # BLD AUTO: 0.4 K/UL (ref 0.3–1)
MONOCYTES NFR BLD: 9.2 % (ref 4–15)
NEUTROPHILS # BLD AUTO: 2.4 K/UL (ref 1.8–7.7)
NEUTROPHILS NFR BLD: 49.1 % (ref 38–73)
NONHDLC SERPL-MCNC: 196 MG/DL
NRBC BLD-RTO: 0 /100 WBC
PLATELET # BLD AUTO: 230 K/UL (ref 150–350)
PMV BLD AUTO: 11.2 FL (ref 9.2–12.9)
POTASSIUM SERPL-SCNC: 4.8 MMOL/L (ref 3.5–5.1)
PROT SERPL-MCNC: 7.5 G/DL (ref 6–8.4)
RBC # BLD AUTO: 5.36 M/UL (ref 4.6–6.2)
SODIUM SERPL-SCNC: 140 MMOL/L (ref 136–145)
TRIGL SERPL-MCNC: 223 MG/DL (ref 30–150)
TSH SERPL DL<=0.005 MIU/L-ACNC: 2.49 UIU/ML (ref 0.4–4)
WBC # BLD AUTO: 4.8 K/UL (ref 3.9–12.7)

## 2020-11-13 PROCEDURE — 80053 COMPREHEN METABOLIC PANEL: CPT

## 2020-11-13 PROCEDURE — 84443 ASSAY THYROID STIM HORMONE: CPT

## 2020-11-13 PROCEDURE — 36415 COLL VENOUS BLD VENIPUNCTURE: CPT | Mod: PO

## 2020-11-13 PROCEDURE — 85025 COMPLETE CBC W/AUTO DIFF WBC: CPT

## 2020-11-13 PROCEDURE — 80061 LIPID PANEL: CPT

## 2020-11-13 PROCEDURE — 84153 ASSAY OF PSA TOTAL: CPT

## 2020-12-16 ENCOUNTER — TELEPHONE (OUTPATIENT)
Dept: GASTROENTEROLOGY | Facility: CLINIC | Age: 60
End: 2020-12-16

## 2020-12-21 ENCOUNTER — TELEPHONE (OUTPATIENT)
Dept: GASTROENTEROLOGY | Facility: CLINIC | Age: 60
End: 2020-12-21

## 2020-12-21 DIAGNOSIS — Z01.818 PREOP EXAMINATION: ICD-10-CM

## 2021-02-03 ENCOUNTER — PATIENT MESSAGE (OUTPATIENT)
Dept: ENDOSCOPY | Facility: HOSPITAL | Age: 61
End: 2021-02-03

## 2021-02-08 ENCOUNTER — TELEPHONE (OUTPATIENT)
Dept: GASTROENTEROLOGY | Facility: CLINIC | Age: 61
End: 2021-02-08

## 2021-02-08 ENCOUNTER — LAB VISIT (OUTPATIENT)
Dept: FAMILY MEDICINE | Facility: CLINIC | Age: 61
End: 2021-02-08
Payer: OTHER GOVERNMENT

## 2021-02-08 DIAGNOSIS — Z01.818 PREOP EXAMINATION: ICD-10-CM

## 2021-02-08 PROCEDURE — U0003 INFECTIOUS AGENT DETECTION BY NUCLEIC ACID (DNA OR RNA); SEVERE ACUTE RESPIRATORY SYNDROME CORONAVIRUS 2 (SARS-COV-2) (CORONAVIRUS DISEASE [COVID-19]), AMPLIFIED PROBE TECHNIQUE, MAKING USE OF HIGH THROUGHPUT TECHNOLOGIES AS DESCRIBED BY CMS-2020-01-R: HCPCS

## 2021-02-08 PROCEDURE — U0005 INFEC AGEN DETEC AMPLI PROBE: HCPCS

## 2021-02-09 RX ORDER — CETIRIZINE HYDROCHLORIDE 10 MG/1
10 TABLET ORAL DAILY
COMMUNITY
End: 2023-05-25

## 2021-02-10 LAB — SARS-COV-2 RNA RESP QL NAA+PROBE: NOT DETECTED

## 2021-02-11 ENCOUNTER — ANESTHESIA EVENT (OUTPATIENT)
Dept: ENDOSCOPY | Facility: HOSPITAL | Age: 61
End: 2021-02-11
Payer: OTHER GOVERNMENT

## 2021-02-11 ENCOUNTER — HOSPITAL ENCOUNTER (OUTPATIENT)
Facility: HOSPITAL | Age: 61
Discharge: HOME | End: 2021-02-11
Attending: INTERNAL MEDICINE | Admitting: INTERNAL MEDICINE
Payer: OTHER GOVERNMENT

## 2021-02-11 ENCOUNTER — ANESTHESIA (OUTPATIENT)
Dept: ENDOSCOPY | Facility: HOSPITAL | Age: 61
End: 2021-02-11
Payer: OTHER GOVERNMENT

## 2021-02-11 DIAGNOSIS — Z12.11 SCREEN FOR COLON CANCER: ICD-10-CM

## 2021-02-11 PROCEDURE — D9220A PRA ANESTHESIA: Mod: CRNA,,, | Performed by: NURSE ANESTHETIST, CERTIFIED REGISTERED

## 2021-02-11 PROCEDURE — G0105 COLORECTAL SCRN; HI RISK IND: HCPCS | Mod: PO | Performed by: INTERNAL MEDICINE

## 2021-02-11 PROCEDURE — D9220A PRA ANESTHESIA: ICD-10-PCS | Mod: CRNA,,, | Performed by: NURSE ANESTHETIST, CERTIFIED REGISTERED

## 2021-02-11 PROCEDURE — 37000009 HC ANESTHESIA EA ADD 15 MINS: Mod: PO | Performed by: INTERNAL MEDICINE

## 2021-02-11 PROCEDURE — 00812 ANES LWR INTST SCR COLSC: CPT | Mod: PO | Performed by: INTERNAL MEDICINE

## 2021-02-11 PROCEDURE — G0105 COLORECTAL SCRN; HI RISK IND: HCPCS | Mod: ,,, | Performed by: INTERNAL MEDICINE

## 2021-02-11 PROCEDURE — 63600175 PHARM REV CODE 636 W HCPCS: Mod: PO | Performed by: INTERNAL MEDICINE

## 2021-02-11 PROCEDURE — D9220A PRA ANESTHESIA: Mod: ANES,,, | Performed by: ANESTHESIOLOGY

## 2021-02-11 PROCEDURE — G0105 COLORECTAL SCRN; HI RISK IND: ICD-10-PCS | Mod: ,,, | Performed by: INTERNAL MEDICINE

## 2021-02-11 PROCEDURE — 37000008 HC ANESTHESIA 1ST 15 MINUTES: Mod: PO | Performed by: INTERNAL MEDICINE

## 2021-02-11 PROCEDURE — 25000003 PHARM REV CODE 250: Mod: PO | Performed by: NURSE ANESTHETIST, CERTIFIED REGISTERED

## 2021-02-11 PROCEDURE — 63600175 PHARM REV CODE 636 W HCPCS: Mod: PO | Performed by: NURSE ANESTHETIST, CERTIFIED REGISTERED

## 2021-02-11 PROCEDURE — D9220A PRA ANESTHESIA: ICD-10-PCS | Mod: ANES,,, | Performed by: ANESTHESIOLOGY

## 2021-02-11 RX ORDER — PROPOFOL 10 MG/ML
VIAL (ML) INTRAVENOUS
Status: DISCONTINUED | OUTPATIENT
Start: 2021-02-11 | End: 2021-02-11

## 2021-02-11 RX ORDER — LIDOCAINE HCL/PF 100 MG/5ML
SYRINGE (ML) INTRAVENOUS
Status: DISCONTINUED | OUTPATIENT
Start: 2021-02-11 | End: 2021-02-11

## 2021-02-11 RX ORDER — SODIUM CHLORIDE 0.9 % (FLUSH) 0.9 %
10 SYRINGE (ML) INJECTION
Status: DISCONTINUED | OUTPATIENT
Start: 2021-02-11 | End: 2021-02-11 | Stop reason: HOSPADM

## 2021-02-11 RX ORDER — SODIUM CHLORIDE, SODIUM LACTATE, POTASSIUM CHLORIDE, CALCIUM CHLORIDE 600; 310; 30; 20 MG/100ML; MG/100ML; MG/100ML; MG/100ML
INJECTION, SOLUTION INTRAVENOUS CONTINUOUS
Status: DISCONTINUED | OUTPATIENT
Start: 2021-02-11 | End: 2021-02-11 | Stop reason: HOSPADM

## 2021-02-11 RX ADMIN — PROPOFOL 50 MG: 10 INJECTION, EMULSION INTRAVENOUS at 10:02

## 2021-02-11 RX ADMIN — PROPOFOL 150 MG: 10 INJECTION, EMULSION INTRAVENOUS at 10:02

## 2021-02-11 RX ADMIN — LIDOCAINE HYDROCHLORIDE 100 MG: 20 INJECTION, SOLUTION INTRAVENOUS at 10:02

## 2021-02-11 RX ADMIN — SODIUM CHLORIDE, SODIUM LACTATE, POTASSIUM CHLORIDE, AND CALCIUM CHLORIDE: .6; .31; .03; .02 INJECTION, SOLUTION INTRAVENOUS at 10:02

## 2021-02-12 VITALS
HEIGHT: 74 IN | SYSTOLIC BLOOD PRESSURE: 120 MMHG | OXYGEN SATURATION: 96 % | WEIGHT: 250 LBS | TEMPERATURE: 97 F | BODY MASS INDEX: 32.08 KG/M2 | DIASTOLIC BLOOD PRESSURE: 76 MMHG | RESPIRATION RATE: 16 BRPM | HEART RATE: 73 BPM

## 2021-02-25 ENCOUNTER — OFFICE VISIT (OUTPATIENT)
Dept: URGENT CARE | Facility: CLINIC | Age: 61
End: 2021-02-25
Payer: OTHER GOVERNMENT

## 2021-02-25 VITALS
SYSTOLIC BLOOD PRESSURE: 126 MMHG | HEART RATE: 79 BPM | TEMPERATURE: 97 F | OXYGEN SATURATION: 97 % | DIASTOLIC BLOOD PRESSURE: 82 MMHG | RESPIRATION RATE: 15 BRPM

## 2021-02-25 DIAGNOSIS — R09.81 SINUS CONGESTION: ICD-10-CM

## 2021-02-25 DIAGNOSIS — J01.01 ACUTE RECURRENT MAXILLARY SINUSITIS: Primary | ICD-10-CM

## 2021-02-25 LAB
CTP QC/QA: YES
SARS-COV-2 RDRP RESP QL NAA+PROBE: NEGATIVE

## 2021-02-25 PROCEDURE — 99214 PR OFFICE/OUTPT VISIT, EST, LEVL IV, 30-39 MIN: ICD-10-PCS | Mod: 25,S$GLB,, | Performed by: PHYSICIAN ASSISTANT

## 2021-02-25 PROCEDURE — U0002 COVID-19 LAB TEST NON-CDC: HCPCS | Mod: QW,S$GLB,, | Performed by: PHYSICIAN ASSISTANT

## 2021-02-25 PROCEDURE — 96372 PR INJECTION,THERAP/PROPH/DIAG2ST, IM OR SUBCUT: ICD-10-PCS | Mod: S$GLB,,, | Performed by: FAMILY MEDICINE

## 2021-02-25 PROCEDURE — 99214 OFFICE O/P EST MOD 30 MIN: CPT | Mod: 25,S$GLB,, | Performed by: PHYSICIAN ASSISTANT

## 2021-02-25 PROCEDURE — 96372 THER/PROPH/DIAG INJ SC/IM: CPT | Mod: S$GLB,,, | Performed by: FAMILY MEDICINE

## 2021-02-25 PROCEDURE — U0002: ICD-10-PCS | Mod: QW,S$GLB,, | Performed by: PHYSICIAN ASSISTANT

## 2021-02-25 RX ORDER — AMOXICILLIN AND CLAVULANATE POTASSIUM 875; 125 MG/1; MG/1
1 TABLET, FILM COATED ORAL 2 TIMES DAILY
Qty: 20 TABLET | Refills: 0 | Status: SHIPPED | OUTPATIENT
Start: 2021-02-25 | End: 2021-02-25

## 2021-02-25 RX ORDER — BETAMETHASONE SODIUM PHOSPHATE AND BETAMETHASONE ACETATE 3; 3 MG/ML; MG/ML
9 INJECTION, SUSPENSION INTRA-ARTICULAR; INTRALESIONAL; INTRAMUSCULAR; SOFT TISSUE
Status: COMPLETED | OUTPATIENT
Start: 2021-02-25 | End: 2021-02-25

## 2021-02-25 RX ORDER — AMOXICILLIN AND CLAVULANATE POTASSIUM 875; 125 MG/1; MG/1
1 TABLET, FILM COATED ORAL 2 TIMES DAILY
Qty: 14 TABLET | Refills: 0 | Status: SHIPPED | OUTPATIENT
Start: 2021-02-25 | End: 2021-03-04

## 2021-02-25 RX ADMIN — BETAMETHASONE SODIUM PHOSPHATE AND BETAMETHASONE ACETATE 9 MG: 3; 3 INJECTION, SUSPENSION INTRA-ARTICULAR; INTRALESIONAL; INTRAMUSCULAR; SOFT TISSUE at 11:02

## 2021-05-04 ENCOUNTER — PATIENT MESSAGE (OUTPATIENT)
Dept: FAMILY MEDICINE | Facility: CLINIC | Age: 61
End: 2021-05-04

## 2021-05-10 ENCOUNTER — PATIENT MESSAGE (OUTPATIENT)
Dept: RESEARCH | Facility: HOSPITAL | Age: 61
End: 2021-05-10

## 2021-07-12 NOTE — PATIENT INSTRUCTIONS
Continue drops 4x per day (Vigamox, Prednisolone, Ketorolac).    Call MD immediately if signs of infection occur (pain, redness, blurred vision). Do not wait for next appointment.    Call MD immediately if you experience new floaters/shadows in your vision, flashes of light, or a curtain or veil appearing to in your vision.    Cell number [phone].     Seth Ledesma is a 59 year old male here for  Chief Complaint   Patient presents with   • Follow-up     Diffuse large B-cell lymphoma, unspecified body region     Denies latex allergy or sensitivity.    Medication verified and med list updated.  PCP and Pharmacy verified.    Social History     Tobacco Use   Smoking Status Current Every Day Smoker   • Packs/day: 0.25   • Years: 25.00   • Pack years: 6.25   • Types: Cigarettes   Smokeless Tobacco Never Used   Tobacco Comment    Patient on Chantix to help quit smoking     Advance Directives Filed: No    ECOG:   ECOG [07/12/21 1159]   ECOG Performance Status 1       Height: No.  Ht Readings from Last 1 Encounters:   06/01/21 6' (1.829 m)     Weight:Yes, shoes on.  Wt Readings from Last 3 Encounters:   06/03/21 102.1 kg   05/17/21 100.2 kg   04/26/21 102.1 kg       BMI: There is no height or weight on file to calculate BMI.    REVIEW OF SYSTEMS  GENERAL:  Patient denies headache, fevers, chills, night sweats, change in appetite, weight loss, dizziness, but complains of: excessive fatigue  ALLERGIC/IMMUNOLOGIC: Verified allergies: Yes  EYES:  Patient denies significant visual difficulties, double vision, blurred vision  ENT/MOUTH: Patient denies problems with hearing, sore throat, sinus drainage, mouth sores  ENDOCRINE:  Patient denies diabetes, thyroid disease, hormone replacement, hot flashes  HEMATOLOGIC/LYMPHATIC: Patient denies easy bruising, bleeding, tender lymph nodes, swollen lymph nodes  BREASTS: Patient denies abnormal masses of breast, nipple discharge, pain  RESPIRATORY:  Patient denies lung pain with breathing, cough, coughing up blood, shortness of breath  CARDIOVASCULAR:  Patient denies anginal chest pain, palpitations, shortness of breath when lying flat, peripheral edema  GASTROINTESTINAL: Patient denies abdominal pain , nausea, vomiting, diarrhea, GI bleeding, constipation, change in bowel habits, heartburn, sensation of feeling full, difficulty  swallowing  : Patient denies blood in the urine, burning with urination, frequency, urgency, hesitancy, incontinence  MUSCULOSKELETAL:  Patient denies joint pain, bone pain, joint swelling, redness, decreased range of motion  SKIN:  Patient denies chronic rashes, inflammation, ulcerations, skin changes, itching  NEUROLOGIC:  Patient denies loss of balance, areas of focal weakness, abnormal gait, sensory problems, numbness, tingling  PSYCHIATRIC: Patient denies insomnia, depression, anxiety

## 2021-07-14 ENCOUNTER — OFFICE VISIT (OUTPATIENT)
Dept: URGENT CARE | Facility: CLINIC | Age: 61
End: 2021-07-14
Payer: OTHER GOVERNMENT

## 2021-07-14 VITALS
RESPIRATION RATE: 16 BRPM | BODY MASS INDEX: 30.8 KG/M2 | HEART RATE: 86 BPM | WEIGHT: 240 LBS | SYSTOLIC BLOOD PRESSURE: 131 MMHG | OXYGEN SATURATION: 96 % | HEIGHT: 74 IN | TEMPERATURE: 98 F | DIASTOLIC BLOOD PRESSURE: 79 MMHG

## 2021-07-14 DIAGNOSIS — J40 BRONCHITIS: ICD-10-CM

## 2021-07-14 DIAGNOSIS — R05.9 COUGH: Primary | ICD-10-CM

## 2021-07-14 LAB
CTP QC/QA: YES
SARS-COV-2 RDRP RESP QL NAA+PROBE: NEGATIVE

## 2021-07-14 PROCEDURE — 99214 OFFICE O/P EST MOD 30 MIN: CPT | Mod: S$GLB,,, | Performed by: EMERGENCY MEDICINE

## 2021-07-14 PROCEDURE — 99214 PR OFFICE/OUTPT VISIT, EST, LEVL IV, 30-39 MIN: ICD-10-PCS | Mod: S$GLB,,, | Performed by: EMERGENCY MEDICINE

## 2021-07-14 PROCEDURE — U0002 COVID-19 LAB TEST NON-CDC: HCPCS | Mod: QW,S$GLB,, | Performed by: EMERGENCY MEDICINE

## 2021-07-14 PROCEDURE — U0002: ICD-10-PCS | Mod: QW,S$GLB,, | Performed by: EMERGENCY MEDICINE

## 2021-07-14 RX ORDER — BENZONATATE 100 MG/1
100 CAPSULE ORAL 3 TIMES DAILY PRN
Qty: 21 CAPSULE | Refills: 0 | Status: SHIPPED | OUTPATIENT
Start: 2021-07-14 | End: 2021-07-24

## 2021-07-14 RX ORDER — DOXYCYCLINE HYCLATE 100 MG
100 TABLET ORAL 2 TIMES DAILY
Qty: 10 TABLET | Refills: 0 | Status: SHIPPED | OUTPATIENT
Start: 2021-07-14 | End: 2021-07-19

## 2021-08-03 ENCOUNTER — PATIENT MESSAGE (OUTPATIENT)
Dept: FAMILY MEDICINE | Facility: CLINIC | Age: 61
End: 2021-08-03

## 2021-08-03 ENCOUNTER — TELEPHONE (OUTPATIENT)
Dept: FAMILY MEDICINE | Facility: CLINIC | Age: 61
End: 2021-08-03

## 2021-08-04 ENCOUNTER — LAB VISIT (OUTPATIENT)
Dept: FAMILY MEDICINE | Facility: CLINIC | Age: 61
End: 2021-08-04
Payer: OTHER GOVERNMENT

## 2021-08-04 ENCOUNTER — OFFICE VISIT (OUTPATIENT)
Dept: FAMILY MEDICINE | Facility: CLINIC | Age: 61
End: 2021-08-04
Payer: OTHER GOVERNMENT

## 2021-08-04 ENCOUNTER — TELEPHONE (OUTPATIENT)
Dept: FAMILY MEDICINE | Facility: CLINIC | Age: 61
End: 2021-08-04

## 2021-08-04 DIAGNOSIS — R50.9 FEVER, UNSPECIFIED FEVER CAUSE: Primary | ICD-10-CM

## 2021-08-04 DIAGNOSIS — R50.9 FEVER, UNSPECIFIED FEVER CAUSE: ICD-10-CM

## 2021-08-04 DIAGNOSIS — J32.9 SINUSITIS, UNSPECIFIED CHRONICITY, UNSPECIFIED LOCATION: ICD-10-CM

## 2021-08-04 PROCEDURE — 99213 PR OFFICE/OUTPT VISIT, EST, LEVL III, 20-29 MIN: ICD-10-PCS | Mod: 95,,, | Performed by: NURSE PRACTITIONER

## 2021-08-04 PROCEDURE — U0005 INFEC AGEN DETEC AMPLI PROBE: HCPCS | Performed by: NURSE PRACTITIONER

## 2021-08-04 PROCEDURE — 99213 OFFICE O/P EST LOW 20 MIN: CPT | Mod: 95,,, | Performed by: NURSE PRACTITIONER

## 2021-08-04 PROCEDURE — U0003 INFECTIOUS AGENT DETECTION BY NUCLEIC ACID (DNA OR RNA); SEVERE ACUTE RESPIRATORY SYNDROME CORONAVIRUS 2 (SARS-COV-2) (CORONAVIRUS DISEASE [COVID-19]), AMPLIFIED PROBE TECHNIQUE, MAKING USE OF HIGH THROUGHPUT TECHNOLOGIES AS DESCRIBED BY CMS-2020-01-R: HCPCS | Performed by: NURSE PRACTITIONER

## 2021-08-04 RX ORDER — DEXAMETHASONE 6 MG/1
6 TABLET ORAL
Qty: 10 TABLET | Refills: 0 | Status: SHIPPED | OUTPATIENT
Start: 2021-08-04 | End: 2021-08-14

## 2021-08-04 RX ORDER — AZITHROMYCIN 250 MG/1
TABLET, FILM COATED ORAL
Qty: 6 TABLET | Refills: 0 | Status: SHIPPED | OUTPATIENT
Start: 2021-08-04 | End: 2021-08-09

## 2021-08-04 RX ORDER — DEXAMETHASONE 6 MG/1
6 TABLET ORAL 2 TIMES DAILY WITH MEALS
Qty: 20 TABLET | Refills: 0 | Status: SHIPPED | OUTPATIENT
Start: 2021-08-04 | End: 2021-08-04

## 2021-08-04 RX ORDER — FLUTICASONE PROPIONATE 50 MCG
1 SPRAY, SUSPENSION (ML) NASAL DAILY
Qty: 16 G | Refills: 0 | Status: SHIPPED | OUTPATIENT
Start: 2021-08-04 | End: 2022-05-03

## 2021-08-05 ENCOUNTER — PATIENT MESSAGE (OUTPATIENT)
Dept: FAMILY MEDICINE | Facility: CLINIC | Age: 61
End: 2021-08-05

## 2021-08-05 ENCOUNTER — TELEPHONE (OUTPATIENT)
Dept: FAMILY MEDICINE | Facility: CLINIC | Age: 61
End: 2021-08-05

## 2021-08-05 DIAGNOSIS — U07.1 COVID-19 VIRUS DETECTED: ICD-10-CM

## 2021-08-05 LAB
SARS-COV-2 RNA RESP QL NAA+PROBE: DETECTED
SARS-COV-2- CYCLE NUMBER: 18.35

## 2021-08-11 ENCOUNTER — PATIENT MESSAGE (OUTPATIENT)
Dept: FAMILY MEDICINE | Facility: CLINIC | Age: 61
End: 2021-08-11

## 2021-11-10 ENCOUNTER — PATIENT MESSAGE (OUTPATIENT)
Dept: FAMILY MEDICINE | Facility: CLINIC | Age: 61
End: 2021-11-10
Payer: OTHER GOVERNMENT

## 2022-01-05 ENCOUNTER — OFFICE VISIT (OUTPATIENT)
Dept: FAMILY MEDICINE | Facility: CLINIC | Age: 62
End: 2022-01-05
Payer: OTHER GOVERNMENT

## 2022-01-05 ENCOUNTER — PATIENT MESSAGE (OUTPATIENT)
Dept: FAMILY MEDICINE | Facility: CLINIC | Age: 62
End: 2022-01-05

## 2022-01-05 DIAGNOSIS — R05.9 COUGH: Primary | ICD-10-CM

## 2022-01-05 PROCEDURE — 99213 PR OFFICE/OUTPT VISIT, EST, LEVL III, 20-29 MIN: ICD-10-PCS | Mod: 95,,, | Performed by: NURSE PRACTITIONER

## 2022-01-05 PROCEDURE — 87502 INFLUENZA DNA AMP PROBE: CPT | Mod: PO | Performed by: NURSE PRACTITIONER

## 2022-01-05 PROCEDURE — 99213 OFFICE O/P EST LOW 20 MIN: CPT | Mod: 95,,, | Performed by: NURSE PRACTITIONER

## 2022-01-05 RX ORDER — AZELASTINE 1 MG/ML
1 SPRAY, METERED NASAL 2 TIMES DAILY
Qty: 30 ML | Refills: 0 | Status: SHIPPED | OUTPATIENT
Start: 2022-01-05 | End: 2022-05-03

## 2022-01-05 RX ORDER — PROMETHAZINE HYDROCHLORIDE AND DEXTROMETHORPHAN HYDROBROMIDE 6.25; 15 MG/5ML; MG/5ML
5 SYRUP ORAL 3 TIMES DAILY PRN
Qty: 240 ML | Refills: 0 | Status: SHIPPED | OUTPATIENT
Start: 2022-01-05 | End: 2022-01-15

## 2022-01-05 RX ORDER — FLUTICASONE PROPIONATE 50 MCG
1 SPRAY, SUSPENSION (ML) NASAL DAILY
Qty: 16 G | Refills: 0 | Status: SHIPPED | OUTPATIENT
Start: 2022-01-05 | End: 2022-03-03

## 2022-01-05 RX ORDER — PREDNISONE 20 MG/1
20 TABLET ORAL DAILY
Qty: 5 TABLET | Refills: 0 | Status: SHIPPED | OUTPATIENT
Start: 2022-01-05 | End: 2022-05-03

## 2022-01-05 NOTE — PROGRESS NOTES
Answers for HPI/ROS submitted by the patient on 1/5/2022  Chronicity: new  Onset: in the past 7 days  Progression since onset: gradually worsening  Fever: no fever  Pain - numeric: 3/10  abdominal pain: No  ear discharge: No  rash: No  cough: Yes  headaches: Yes  rhinorrhea: Yes  diarrhea: Yes  hearing loss: No  sore throat: Yes  neck pain: No  vomiting: No  drainage: Yes  Treatments tried: NSAIDs  Improvement on treatment: mild  Pain severity: mild  chronic ear infection: No  hearing loss: No  tympanostomy tube: No

## 2022-01-05 NOTE — PROGRESS NOTES
Subjective:       Patient ID: Samir Muñoz is a 61 y.o. male.    Chief Complaint: No chief complaint on file.    URI X 4 days, theraflu and tylenol for symptoms.     Past Medical History:  No date: Cataract      Comment:  OD  No date: GERD (gastroesophageal reflux disease)  No date: Hyperlipidemia  No date: Hyperlipidemia  No date: Shingles      Comment:  involved eye  No date: Sleep apnea      Comment:  resolved with UPPP    Past Surgical History:  No date: ADENOIDECTOMY  10/25/2017: CATARACT EXTRACTION W/  INTRAOCULAR LENS IMPLANT; Left      Comment:  Dr Reza  2/11/2021: COLONOSCOPY; N/A      Comment:  Procedure: COLONOSCOPY;  Surgeon: Josué Ray MD;  Location: [facility];  Service: Endoscopy;                 Laterality: N/A;  No date: nerve block      Comment:  for shingles  No date: TONSILLECTOMY  No date: UVULOPALATOPHARYNGOPLASTY    Review of patient's family history indicates:  Problem: Hypertension      Relation: Father          Age of Onset: (Not Specified)  Problem: Autoimmune disease      Relation: Brother          Age of Onset: (Not Specified)  Problem: No Known Problems      Relation: Daughter          Age of Onset: (Not Specified)  Problem: No Known Problems      Relation: Daughter          Age of Onset: (Not Specified)  Problem: No Known Problems      Relation: Daughter          Age of Onset: (Not Specified)  Problem: No Known Problems      Relation: Daughter          Age of Onset: (Not Specified)  Problem: Cancer      Relation: Maternal Grandmother          Age of Onset: (Not Specified)  Problem: Heart attack      Relation: Paternal Grandfather          Age of Onset: (Not Specified)  Problem: Amblyopia      Relation: Neg Hx          Age of Onset: (Not Specified)  Problem: Blindness      Relation: Neg Hx          Age of Onset: (Not Specified)  Problem: Cataracts      Relation: Neg Hx          Age of Onset: (Not Specified)  Problem: Glaucoma      Relation: Neg Hx           Age of Onset: (Not Specified)  Problem: Diabetes      Relation: Neg Hx          Age of Onset: (Not Specified)  Problem: Macular degeneration      Relation: Neg Hx          Age of Onset: (Not Specified)  Problem: Retinal detachment      Relation: Neg Hx          Age of Onset: (Not Specified)  Problem: Strabismus      Relation: Neg Hx          Age of Onset: (Not Specified)  Problem: Stroke      Relation: Neg Hx          Age of Onset: (Not Specified)  Problem: Thyroid disease      Relation: Neg Hx          Age of Onset: (Not Specified)      Social History    Socioeconomic History      Marital status:       Number of children: 4    Tobacco Use      Smoking status: Never Smoker      Smokeless tobacco: Never Used    Substance and Sexual Activity      Alcohol use: Yes        Alcohol/week: 3.0 standard drinks        Types: 3 Cans of beer per week        Comment: weekend      Drug use: No      Sexual activity: Yes        Partners: Female      Current Outpatient Medications:  azelastine (ASTELIN) 137 mcg (0.1 %) nasal spray, 1 spray (137 mcg total) by Nasal route 2 (two) times daily., Disp: 30 mL, Rfl: 0  cetirizine (ZYRTEC) 10 MG tablet, Take 10 mg by mouth once daily., Disp: , Rfl:   fluticasone propionate (FLONASE) 50 mcg/actuation nasal spray, 1 spray (50 mcg total) by Each Nostril route once daily., Disp: 16 g, Rfl: 0  fluticasone propionate (FLONASE) 50 mcg/actuation nasal spray, 1 spray (50 mcg total) by Each Nostril route once daily., Disp: 16 g, Rfl: 0  glucosamine-chondroitin (OSTEO BI-FLEX) 250-200 mg Tab, Take by mouth., Disp: , Rfl:   multivitamin (ONE DAILY MULTIVITAMIN) per tablet, Take 1 tablet by mouth once daily., Disp: , Rfl:   predniSONE (DELTASONE) 20 MG tablet, Take 1 tablet (20 mg total) by mouth once daily., Disp: 5 tablet, Rfl: 0  promethazine-dextromethorphan (PROMETHAZINE-DM) 6.25-15 mg/5 mL Syrp, Take 5 mLs by mouth 3 (three) times daily as needed., Disp: 240 mL, Rfl: 0    No current  facility-administered medications for this visit.      Review of patient's allergies indicates:   -- No known allergies     Otalgia   This is a new problem. The current episode started in the past 7 days. The problem has been gradually worsening. There has been no fever. The pain is at a severity of 3/10. The pain is mild. Associated symptoms include coughing, diarrhea, drainage, headaches, rhinorrhea and a sore throat. Pertinent negatives include no abdominal pain, ear discharge, hearing loss, neck pain, rash or vomiting. He has tried NSAIDs for the symptoms. The treatment provided mild relief. There is no history of a chronic ear infection, hearing loss or a tympanostomy tube.     Review of Systems   Constitutional: Positive for fatigue.   HENT: Positive for ear pain, rhinorrhea and sore throat. Negative for ear discharge and hearing loss.    Respiratory: Positive for cough.    Gastrointestinal: Positive for diarrhea. Negative for abdominal pain and vomiting.   Musculoskeletal: Negative for neck pain.   Integumentary:  Negative for rash.   Neurological: Positive for headaches.         Objective:      Physical Exam  Constitutional:       General: He is not in acute distress.     Appearance: Normal appearance.   HENT:      Head: Normocephalic and atraumatic.   Pulmonary:      Effort: Pulmonary effort is normal. No respiratory distress.   Neurological:      General: No focal deficit present.      Mental Status: He is alert and oriented to person, place, and time.   Psychiatric:         Mood and Affect: Mood normal.         Behavior: Behavior normal.         Assessment:       Problem List Items Addressed This Visit    None     Visit Diagnoses     Cough    -  Primary    Relevant Medications    fluticasone propionate (FLONASE) 50 mcg/actuation nasal spray    azelastine (ASTELIN) 137 mcg (0.1 %) nasal spray    promethazine-dextromethorphan (PROMETHAZINE-DM) 6.25-15 mg/5 mL Syrp    predniSONE (DELTASONE) 20 MG tablet     Other Relevant Orders    POCT Rapid Strep A    Influenza A & B by Molecular    POCT COVID-19 Rapid Screening          Plan:       1. Cough  Strep, Flu, and COVID 19 set up for tomorrow. Symptomatic treatment as discussed, Quarantine until results are received, ED precautions discussed.   - POCT Rapid Strep A  - Influenza A & B by Molecular  - POCT COVID-19 Rapid Screening  - fluticasone propionate (FLONASE) 50 mcg/actuation nasal spray; 1 spray (50 mcg total) by Each Nostril route once daily.  Dispense: 16 g; Refill: 0  - azelastine (ASTELIN) 137 mcg (0.1 %) nasal spray; 1 spray (137 mcg total) by Nasal route 2 (two) times daily.  Dispense: 30 mL; Refill: 0  - promethazine-dextromethorphan (PROMETHAZINE-DM) 6.25-15 mg/5 mL Syrp; Take 5 mLs by mouth 3 (three) times daily as needed.  Dispense: 240 mL; Refill: 0  - predniSONE (DELTASONE) 20 MG tablet; Take 1 tablet (20 mg total) by mouth once daily.  Dispense: 5 tablet; Refill: 0

## 2022-01-06 LAB
CTP QC/QA: YES
CTP QC/QA: YES
INFLUENZA A, MOLECULAR: NEGATIVE
INFLUENZA B, MOLECULAR: NEGATIVE
S PYO RRNA THROAT QL PROBE: NEGATIVE
SARS-COV-2 RDRP RESP QL NAA+PROBE: NEGATIVE
SPECIMEN SOURCE: NORMAL

## 2022-01-07 ENCOUNTER — TELEPHONE (OUTPATIENT)
Dept: FAMILY MEDICINE | Facility: CLINIC | Age: 62
End: 2022-01-07
Payer: OTHER GOVERNMENT

## 2022-01-07 NOTE — TELEPHONE ENCOUNTER
Called patient and informed him of his negative flu, COVID, and strep results. Patient expressed understanding.

## 2022-01-07 NOTE — TELEPHONE ENCOUNTER
----- Message from Roselyn Cavazos NP sent at 1/6/2022 10:26 AM CST -----  COVID negative, Flu negative, Strep negative. Proceed with medication as prescribed.

## 2022-01-12 ENCOUNTER — PATIENT MESSAGE (OUTPATIENT)
Dept: FAMILY MEDICINE | Facility: CLINIC | Age: 62
End: 2022-01-12
Payer: OTHER GOVERNMENT

## 2022-01-12 NOTE — TELEPHONE ENCOUNTER
patient is having trouble using the cpap and want to try something else please read note and advise

## 2022-01-25 ENCOUNTER — OFFICE VISIT (OUTPATIENT)
Dept: OTOLARYNGOLOGY | Facility: CLINIC | Age: 62
End: 2022-01-25
Payer: OTHER GOVERNMENT

## 2022-01-25 VITALS — WEIGHT: 260.13 LBS | BODY MASS INDEX: 33.4 KG/M2

## 2022-01-25 DIAGNOSIS — Z78.9 INTOLERANCE OF CONTINUOUS POSITIVE AIRWAY PRESSURE (CPAP) VENTILATION: ICD-10-CM

## 2022-01-25 DIAGNOSIS — G47.33 OSA (OBSTRUCTIVE SLEEP APNEA): Primary | ICD-10-CM

## 2022-01-25 PROCEDURE — 99204 PR OFFICE/OUTPT VISIT, NEW, LEVL IV, 45-59 MIN: ICD-10-PCS | Mod: S$PBB,,, | Performed by: OTOLARYNGOLOGY

## 2022-01-25 PROCEDURE — 99213 OFFICE O/P EST LOW 20 MIN: CPT | Mod: PBBFAC,PO | Performed by: OTOLARYNGOLOGY

## 2022-01-25 PROCEDURE — 99999 PR PBB SHADOW E&M-EST. PATIENT-LVL III: CPT | Mod: PBBFAC,,, | Performed by: OTOLARYNGOLOGY

## 2022-01-25 PROCEDURE — 99999 PR PBB SHADOW E&M-EST. PATIENT-LVL III: ICD-10-PCS | Mod: PBBFAC,,, | Performed by: OTOLARYNGOLOGY

## 2022-01-25 PROCEDURE — 99204 OFFICE O/P NEW MOD 45 MIN: CPT | Mod: S$PBB,,, | Performed by: OTOLARYNGOLOGY

## 2022-01-25 NOTE — PROGRESS NOTES
Subjective:       Patient ID: Samir Muñoz is a 61 y.o. male.    Chief Complaint: consult about inspire (Sleep study 1yr ago)    Samir is here for Obstructive sleep apnea and intolerance of CPAP.  he was diagnosed with EMELY 3 years ago.   Last sleep study: 2018. ISIDRA / AHI: 22; oxygen romero: 89%  he has issues with CPAP compliance: He is able to initiate sleep but wakes up with claustrophobia and cannot tolerate the mask entire night.   Previous surgical treatments for sleep apnea: Nasal surgery and UPPP  BMI 33     Pertinent medical issues: none  Anticoagulation: none  Pertinent surgery: none    Patient validated questionnaires (if applicable):      %       No flowsheet data found.  No flowsheet data found.  No flowsheet data found.         Social History     Tobacco Use   Smoking Status Never Smoker   Smokeless Tobacco Never Used     Social History     Substance and Sexual Activity   Alcohol Use Yes    Alcohol/week: 3.0 standard drinks    Types: 3 Cans of beer per week    Comment: weekend          Objective:        Constitutional:   He is oriented to person, place, and time. He appears well-developed and well-nourished. He appears alert. He is active. Normal speech.      Head:  Normocephalic and atraumatic. Head is without TMJ tenderness. No scars. Salivary glands normal.  Facial strength is normal.      Ears:    Right Ear: No drainage or swelling. No middle ear effusion.   Left Ear: No drainage or swelling.  No middle ear effusion.     Nose:  No mucosal edema, rhinorrhea or sinus tenderness. No turbinate hypertrophy.      Mouth/Throat  Oropharynx clear and moist without lesions or asymmetry, normal uvula midline and mirror exam normal. Normal dentition. No uvula swelling, lacerations or trismus. No oropharyngeal exudate. Tonsillar erythema, tonsillar exudate.      Neck:  Full range of motion with neck supple and no adenopathy. Thyroid tenderness is present. No tracheal deviation, no edema, no erythema,  normal range of motion, no stridor, no crepitus and no neck rigidity present. No thyroid mass present.     Cardiovascular:   Intact distal pulses and normal pulses.      Pulmonary/Chest:   Effort normal and breath sounds normal. No stridor.     Psychiatric:   His speech is normal and behavior is normal. His mood appears not anxious. His affect is not labile.     Neurological:   He is alert and oriented to person, place, and time. No sensory deficit.     Skin:   No abrasions, lacerations, lesions, or rashes. No abrasion and no bruising noted.         Tests / Results:  Reviewed PSG    Assessment:       1. EMELY (obstructive sleep apnea)    2. Intolerance of continuous positive airway pressure (CPAP) ventilation    3. BMI 33.0-33.9,adult          Plan:         Discussed background of EMELY at length including medical therapy and surgical therapy    Patient interested in Inspire  Discussed possibility of need for repeat PSG, but meets criteria based on PSG 2018  Discussed DISE (drug induced sleep endoscopy)  He will discuss with wife and let me know if he would like to proceed    I discussed the risks of hypoglossal nerve stimulation including: scar, bleeding, numbness of incision, numbness or weakness of tongue or marginal mandibular nerve, irritation of tongue from stimulation, implant failure, inadequate improvement, need for further procedures, need for removal of implant, infection, pneumothorax, MRI incompatibility.

## 2022-01-25 NOTE — PATIENT INSTRUCTIONS
Information regarding Hypoglossal Nerve Stimulation Therapy:    Hypoglossal Nerve Stimulation Therapy (HGNS) is a surgical treatment for sleep apnea when a patient fails standard positive pressure (CPAP) therapy. This is an effective surgical therapy for sleep apnea with long term effectiveness in many patients.    What is HGNS and how does it work?  The hypoglossal nerve is the nerve that controls nearly all of the tongue movements.   During sleep, the tongue will often fall into the back of the throat. This causes obstruction and apneas by narrowing the throat.  By targeting the tongue muscles, we can often control the prevention of tongue collapse during sleep thereby preventing collapse and apnea.  This procedure involves inserting an implant over the muscles of the chest wall (similar to a pacemaker.) This implant is connected to the nerve that moves the tongue forward. During sleep, the implant will detect breathing effort, and during inspiration, it will cause a gentle pulse of the tongue forward to prevent collapse.   The machine can be turned on and off, and it can be dialed up or down depending on strength needed to move the tongue forward.    Will I feel it? Will it affect my sleep at night?  Once the implant is in place, it is activated after 1 month. After this, you will begin increasing the settings to find the most comfortable setting for you. We work hard with you to get the right settings.   A majority of patients are not disturbed at night with the device, and satisfaction is extremely high (95%.) This is especially true when compared to wearing an external device (CPAP.)   A post-titration sleep study is typically obtained about 3 months after surgery to assess response.     Am I a Candidate?  Candidacy for HGNS is changing over time; however, current general candidacy requirements are:  1. Moderate or Severe Sleep Apnea (AHI 15-65) as measured by a recent sleep study  2. Body Mass Index (BMI) <  35   3. Failure to tolerate CPAP therapy  4. Pre-operative endoscopy exam confirming candidacy (performed during a quick outpatient procedure)  5. No contraindications to implant  6. Approval by insurance company  **The current device is contraindicated for MRI of the thoracic, shoulder, or abdomen area. If you need regular images of this area, this device is not appropriate for you at this time.     How is the surgery performed:  The surgery is usually performed in an outpatient setting, under general anesthesia.  The procedure generally takes a few hours.  The procedure involved two incisions: one in the neck, just below the jaw line. The other is in the chest between the 2nd and third rib. The surgical scars generally heal very well and are minimally noticeable.   Please let your doctor know if you have had surgery or significant trauma to the chest or neck.     What are the risks?  General surgical risks, similar to any procedure include  1. Bleeding or hematoma  2. Numbness over incision site  3. Incisional scar  4. Pain over incision site  5. Infection of implant requiring revision or replacement (<1%)  6. Intolerance to the therapy resulting in non-usage    Specific risks to this surgery include:  1. Temporary tongue weakness or tingling, rarely permanent (< 1%)  2. Mechanical failure of the implant  3. Temporary or permanent weakness to the muscles of the lip (< 1%)  4. Stimulation related discomfort or tongue abrasions (8%)  5. Pneumothorax (dropped lung)    After care:  Instructions will be given following the surgery  Generally, light activity for 2 weeks is recommended.  Blood thinners are held prior to surgery at the discretion of the surgeon and can usually be resumed within 48 hours.     Post-operative timeline:    WEEK 1:  About 7 days after your procedure, you will return to the office for a follow up visit. At this time any surtures that we placed will be removed and your incisions will be checked  by the harper. Please note that device will remain inactive for ONE MONTH. This is refered to as your healing phase. We want to give your body time to heal before we turn the device on. During this time it would be benefical for you to watch the vidoes on the inspire phillip. Simply download the phillip if you have not done so, then click on the RESOURCES tab. From there, scrol to the INSPIRE CARE section. There are vidoes here that walk you though this phase and will prepare you for the activation visit. Focus on the vidoes entitled: Rest and Heal, Learning your sleep remote, and Tuning Inspire. This will help you feel educated and empowered for your visit.     WEEK 4:  At the one month visit you will come to the office for the device activation. Please wear a shirt or blouse that can allow the physician to check all the incisions and access the implant. Your physician will use a  to check the device for proper function and turn it on for you to begin using Inspire therapy. The process of activation is NOT painful. At this visit you will be given your remote control and instructions on how to use. You will return home to begin using your therapy with the goal of using it ALL NIGHT, EVERY NIGHT. If you have any issues with discomfort, please call the office so we can help you reach this goal.    WEEK 6-8:  Someone from our office will be doing a check in call to see how you are progressing. If you are having any issues, we can bring you in to the office and make some appropratie adjustments as needed. Otherwise, you will continue to keep stepping up your therapy and using it all night.     WEEK 12:  Roughly 3 months after your device is turned on, you will return to the sleep lab. This will help us determine if your therapy is at the appropriate level for you. A couple of weeks after your sleep study, you will return to the office to review your results and make any changes that your provider feels necessary.        FAQ:  1. I don't feel the stimulation?   Answer: During the tuning phase, you will get used to stimulation.  It's very common to not feel stimulation.  Try stepping up your level.    2. The stimulation does not synchronize with my breathing?    Answer: This is normal.  Stimulation timing is designed to work best while you are asleep.  Your breathing pattern is much different while you are awake.  When you go to sleep, the sensor will work normally.     3. Can I receive an MRI?    Answer: If you have the model 3028 generator it is MRI Conditional, make sure your doctor goes to the Inspire website for more information on how to perform a safe MRI for you.     4. Can I go to the airport?    Answer: Yes.  You may go through metal detectors and scanners.  Make sure to show the TSA official your medical device registration card and tell them you have an implantable device in your body.  They may require extra screening, so make sure to plan for extra time your first time going to the airport.     5. Can I go scuba diving and mountain climbing?    Answer: You can go to as high in elevation as you want.  However, you can only go 30 meters below sea level or 4 atmospheres of pressure.     6. Can I continue welding?    Answer: Inspire does not recommend electrical welding.  Consult your patient manual for more details.    7. Can I go to the dentist?    Answer: Going to the dentist is perfectly fine. Let your dentist know you have an implantable device and the dentist will take any precautions needed.     8. Can I receive X-Rays or CT Scans?    Answer: Yes.  X-Ray and CT Scans are safe with Inspire.  If you are having a mammogram though, make sure to let the technician know where your device is implanted so the technician can make the mammogram as comfortable as possible.     9. Why is stimulation on when I'm awake?    Answer: This is normal.  Inspire works by stimulating during every breath.  Inspire does not know when  you are asleep or awake, so once you turn on Inspire, it will stimulate with each breath. Remember, you are in complete control of your Inspire and can turn Inspire ON or OFF using your Inspire Sleep Remote.     10. Why is stimulation off when I wake up?    Answer: You may have slept longer than the Therapy Duration Setting, which by default is 8 hours.  Inspire will turn off automatically after 8 hours.  If you are sleeping longer than 8 hours, ask your physician to change the duration setting to a longer time (for example 9 or 10 hours).  It is also possible that you are getting used to Inspire and just don't feel the stimulation when you wake up.  If you're sleep remote is green when you gently shake it, your Inspire is still on.  If your Inspire is white, then it is no longer stimulating.       More information can be found at:  [email or web address].com

## 2022-05-03 ENCOUNTER — OFFICE VISIT (OUTPATIENT)
Dept: FAMILY MEDICINE | Facility: CLINIC | Age: 62
End: 2022-05-03
Payer: OTHER GOVERNMENT

## 2022-05-03 VITALS
HEIGHT: 74 IN | SYSTOLIC BLOOD PRESSURE: 132 MMHG | BODY MASS INDEX: 32.94 KG/M2 | DIASTOLIC BLOOD PRESSURE: 80 MMHG | HEART RATE: 74 BPM | OXYGEN SATURATION: 97 % | WEIGHT: 256.63 LBS

## 2022-05-03 DIAGNOSIS — E78.5 HYPERLIPIDEMIA, UNSPECIFIED HYPERLIPIDEMIA TYPE: ICD-10-CM

## 2022-05-03 DIAGNOSIS — H53.9 VISION CHANGES: ICD-10-CM

## 2022-05-03 DIAGNOSIS — Z00.00 WELLNESS EXAMINATION: Primary | ICD-10-CM

## 2022-05-03 DIAGNOSIS — H69.90 DYSFUNCTION OF EUSTACHIAN TUBE, UNSPECIFIED LATERALITY: ICD-10-CM

## 2022-05-03 PROBLEM — Z12.11 SCREEN FOR COLON CANCER: Status: RESOLVED | Noted: 2021-02-11 | Resolved: 2022-05-03

## 2022-05-03 PROCEDURE — 99999 PR PBB SHADOW E&M-EST. PATIENT-LVL III: CPT | Mod: PBBFAC,,, | Performed by: FAMILY MEDICINE

## 2022-05-03 PROCEDURE — 99213 OFFICE O/P EST LOW 20 MIN: CPT | Mod: PBBFAC,PO | Performed by: FAMILY MEDICINE

## 2022-05-03 PROCEDURE — 99999 PR PBB SHADOW E&M-EST. PATIENT-LVL III: ICD-10-PCS | Mod: PBBFAC,,, | Performed by: FAMILY MEDICINE

## 2022-05-03 PROCEDURE — 99396 PR PREVENTIVE VISIT,EST,40-64: ICD-10-PCS | Mod: S$PBB,,, | Performed by: FAMILY MEDICINE

## 2022-05-03 PROCEDURE — 99396 PREV VISIT EST AGE 40-64: CPT | Mod: S$PBB,,, | Performed by: FAMILY MEDICINE

## 2022-05-03 RX ORDER — MONTELUKAST SODIUM 10 MG/1
10 TABLET ORAL NIGHTLY
Qty: 90 TABLET | Refills: 1 | Status: SHIPPED | OUTPATIENT
Start: 2022-05-03 | End: 2022-06-02

## 2022-05-03 RX ORDER — METHYLPREDNISOLONE 4 MG/1
TABLET ORAL
Qty: 21 EACH | Refills: 0 | Status: SHIPPED | OUTPATIENT
Start: 2022-05-03 | End: 2022-05-24

## 2022-05-03 NOTE — PROGRESS NOTES
Subjective:       Patient ID: Samir Muñoz is a 61 y.o. male.    Chief Complaint: wellness exam    Here for wellness and f/u chronic issues. Feels more clumsy for last month. Has issue picking up glasses or knocking thing off table.  Wondering if depth perception.  In his normal state of health otherwise.   Worse AR symptoms lately and sneezing more.  Ear congested as well on left.    Review of Systems   Constitutional: Negative for activity change, chills, fever and unexpected weight change.   HENT: Positive for rhinorrhea. Negative for hearing loss and trouble swallowing.    Eyes: Negative for discharge and visual disturbance.   Respiratory: Negative for cough, chest tightness, shortness of breath and wheezing.    Cardiovascular: Negative for chest pain, palpitations and leg swelling.   Gastrointestinal: Negative for blood in stool, constipation, diarrhea and vomiting.   Endocrine: Negative for cold intolerance, heat intolerance, polydipsia and polyuria.   Genitourinary: Negative for difficulty urinating, hematuria and urgency.   Musculoskeletal: Positive for arthralgias. Negative for joint swelling and neck pain.   Neurological: Positive for headaches. Negative for weakness.   Psychiatric/Behavioral: Positive for confusion. Negative for decreased concentration and dysphoric mood. The patient is not nervous/anxious.        Objective:      Physical Exam  Vitals and nursing note reviewed.   Constitutional:       Appearance: He is well-developed.   HENT:      Head: Normocephalic and atraumatic.      Ears:     Cardiovascular:      Rate and Rhythm: Normal rate and regular rhythm.      Heart sounds: Normal heart sounds.   Pulmonary:      Effort: Pulmonary effort is normal.      Breath sounds: Normal breath sounds.         Assessment:       1. Wellness examination    2. Hyperlipidemia, unspecified hyperlipidemia type    3. Vision changes    4. Dysfunction of Eustachian tube, unspecified laterality        Plan:        Wellness examination  -     Comprehensive Metabolic Panel; Future; Expected date: 05/03/2022  -     CBC Auto Differential; Future; Expected date: 05/03/2022  -     Lipid Panel; Future; Expected date: 05/03/2022  -     PSA, Screening; Future; Expected date: 05/03/2022  -     TSH; Future; Expected date: 05/03/2022    Hyperlipidemia, unspecified hyperlipidemia type    Vision changes  -     Ambulatory referral/consult to Optometry; Future; Expected date: 05/10/2022    Dysfunction of Eustachian tube, unspecified laterality    Other orders  -     montelukast (SINGULAIR) 10 mg tablet; Take 1 tablet (10 mg total) by mouth every evening.  Dispense: 90 tablet; Refill: 1  -     methylPREDNISolone (MEDROL DOSEPACK) 4 mg tablet; use as directed  Dispense: 21 each; Refill: 0      Work up further with mri as needed if symptoms not improved soon  Treat for ETD for now  Update labs and health josé  Will monitor chronic medical issues and continue current plan of care.    Follow up in about 6 months (around 11/3/2022), or if symptoms worsen or fail to improve.

## 2022-05-09 ENCOUNTER — PATIENT MESSAGE (OUTPATIENT)
Dept: SMOKING CESSATION | Facility: CLINIC | Age: 62
End: 2022-05-09
Payer: OTHER GOVERNMENT

## 2022-05-10 ENCOUNTER — LAB VISIT (OUTPATIENT)
Dept: LAB | Facility: HOSPITAL | Age: 62
End: 2022-05-10
Attending: FAMILY MEDICINE
Payer: OTHER GOVERNMENT

## 2022-05-10 DIAGNOSIS — Z00.00 WELLNESS EXAMINATION: ICD-10-CM

## 2022-05-10 LAB
ALBUMIN SERPL BCP-MCNC: 4.1 G/DL (ref 3.5–5.2)
ALP SERPL-CCNC: 82 U/L (ref 55–135)
ALT SERPL W/O P-5'-P-CCNC: 37 U/L (ref 10–44)
ANION GAP SERPL CALC-SCNC: 8 MMOL/L (ref 8–16)
AST SERPL-CCNC: 23 U/L (ref 10–40)
BILIRUB SERPL-MCNC: 0.5 MG/DL (ref 0.1–1)
BUN SERPL-MCNC: 17 MG/DL (ref 6–20)
CALCIUM SERPL-MCNC: 10.2 MG/DL (ref 8.7–10.5)
CHLORIDE SERPL-SCNC: 103 MMOL/L (ref 95–110)
CHOLEST SERPL-MCNC: 247 MG/DL (ref 120–199)
CHOLEST/HDLC SERPL: 4.8 {RATIO} (ref 2–5)
CO2 SERPL-SCNC: 27 MMOL/L (ref 23–29)
COMPLEXED PSA SERPL-MCNC: 0.49 NG/ML (ref 0–4)
CREAT SERPL-MCNC: 1.4 MG/DL (ref 0.5–1.4)
EST. GFR  (AFRICAN AMERICAN): >60 ML/MIN/1.73 M^2
EST. GFR  (NON AFRICAN AMERICAN): 54.6 ML/MIN/1.73 M^2
GLUCOSE SERPL-MCNC: 90 MG/DL (ref 70–110)
HDLC SERPL-MCNC: 52 MG/DL (ref 40–75)
HDLC SERPL: 21.1 % (ref 20–50)
LDLC SERPL CALC-MCNC: 134.6 MG/DL (ref 63–159)
NONHDLC SERPL-MCNC: 195 MG/DL
POTASSIUM SERPL-SCNC: 4.9 MMOL/L (ref 3.5–5.1)
PROT SERPL-MCNC: 7.6 G/DL (ref 6–8.4)
SODIUM SERPL-SCNC: 138 MMOL/L (ref 136–145)
TRIGL SERPL-MCNC: 302 MG/DL (ref 30–150)
TSH SERPL DL<=0.005 MIU/L-ACNC: 1.38 UIU/ML (ref 0.4–4)

## 2022-05-10 PROCEDURE — 36415 COLL VENOUS BLD VENIPUNCTURE: CPT | Mod: PN | Performed by: FAMILY MEDICINE

## 2022-05-10 PROCEDURE — 84443 ASSAY THYROID STIM HORMONE: CPT | Performed by: FAMILY MEDICINE

## 2022-05-10 PROCEDURE — 80053 COMPREHEN METABOLIC PANEL: CPT | Performed by: FAMILY MEDICINE

## 2022-05-10 PROCEDURE — 80061 LIPID PANEL: CPT | Performed by: FAMILY MEDICINE

## 2022-05-10 PROCEDURE — 85025 COMPLETE CBC W/AUTO DIFF WBC: CPT | Performed by: FAMILY MEDICINE

## 2022-05-10 PROCEDURE — 84153 ASSAY OF PSA TOTAL: CPT | Performed by: FAMILY MEDICINE

## 2022-05-11 LAB
BASOPHILS # BLD AUTO: 0.03 K/UL (ref 0–0.2)
BASOPHILS NFR BLD: 0.7 % (ref 0–1.9)
DIFFERENTIAL METHOD: NORMAL
EOSINOPHIL # BLD AUTO: 0.1 K/UL (ref 0–0.5)
EOSINOPHIL NFR BLD: 3.1 % (ref 0–8)
ERYTHROCYTE [DISTWIDTH] IN BLOOD BY AUTOMATED COUNT: 13.8 % (ref 11.5–14.5)
HCT VFR BLD AUTO: 48.6 % (ref 40–54)
HGB BLD-MCNC: 15.9 G/DL (ref 14–18)
IMM GRANULOCYTES # BLD AUTO: 0.02 K/UL (ref 0–0.04)
IMM GRANULOCYTES NFR BLD AUTO: 0.4 % (ref 0–0.5)
LYMPHOCYTES # BLD AUTO: 1.6 K/UL (ref 1–4.8)
LYMPHOCYTES NFR BLD: 34.9 % (ref 18–48)
MCH RBC QN AUTO: 29.3 PG (ref 27–31)
MCHC RBC AUTO-ENTMCNC: 32.7 G/DL (ref 32–36)
MCV RBC AUTO: 90 FL (ref 82–98)
MONOCYTES # BLD AUTO: 0.5 K/UL (ref 0.3–1)
MONOCYTES NFR BLD: 10.4 % (ref 4–15)
NEUTROPHILS # BLD AUTO: 2.3 K/UL (ref 1.8–7.7)
NEUTROPHILS NFR BLD: 50.5 % (ref 38–73)
NRBC BLD-RTO: 0 /100 WBC
PLATELET # BLD AUTO: 243 K/UL (ref 150–450)
PMV BLD AUTO: 11.4 FL (ref 9.2–12.9)
RBC # BLD AUTO: 5.43 M/UL (ref 4.6–6.2)
WBC # BLD AUTO: 4.5 K/UL (ref 3.9–12.7)

## 2022-05-24 DIAGNOSIS — M25.562 ACUTE PAIN OF LEFT KNEE: Primary | ICD-10-CM

## 2022-05-25 ENCOUNTER — HOSPITAL ENCOUNTER (OUTPATIENT)
Dept: RADIOLOGY | Facility: HOSPITAL | Age: 62
Discharge: HOME | End: 2022-05-25
Attending: ORTHOPAEDIC SURGERY
Payer: OTHER GOVERNMENT

## 2022-05-25 ENCOUNTER — OFFICE VISIT (OUTPATIENT)
Dept: ORTHOPEDICS | Facility: CLINIC | Age: 62
End: 2022-05-25
Payer: OTHER GOVERNMENT

## 2022-05-25 VITALS — HEIGHT: 74 IN | BODY MASS INDEX: 32.85 KG/M2 | WEIGHT: 256 LBS

## 2022-05-25 DIAGNOSIS — M25.562 ACUTE PAIN OF LEFT KNEE: ICD-10-CM

## 2022-05-25 DIAGNOSIS — M25.562 ACUTE PAIN OF LEFT KNEE: Primary | ICD-10-CM

## 2022-05-25 PROCEDURE — 73560 X-RAY EXAM OF KNEE 1 OR 2: CPT | Mod: 59,TC,PO,RT

## 2022-05-25 PROCEDURE — 20610 DRAIN/INJ JOINT/BURSA W/O US: CPT | Mod: PBBFAC,PN | Performed by: ORTHOPAEDIC SURGERY

## 2022-05-25 PROCEDURE — 99214 OFFICE O/P EST MOD 30 MIN: CPT | Mod: 25,S$PBB,, | Performed by: ORTHOPAEDIC SURGERY

## 2022-05-25 PROCEDURE — 73562 XR KNEE ORTHO LEFT: ICD-10-PCS | Mod: 26,LT,, | Performed by: RADIOLOGY

## 2022-05-25 PROCEDURE — 99999 PR PBB SHADOW E&M-EST. PATIENT-LVL III: CPT | Mod: PBBFAC,,, | Performed by: ORTHOPAEDIC SURGERY

## 2022-05-25 PROCEDURE — 73560 X-RAY EXAM OF KNEE 1 OR 2: CPT | Mod: 26,RT,, | Performed by: RADIOLOGY

## 2022-05-25 PROCEDURE — 99213 OFFICE O/P EST LOW 20 MIN: CPT | Mod: PBBFAC,PN,25 | Performed by: ORTHOPAEDIC SURGERY

## 2022-05-25 PROCEDURE — 73560 XR KNEE ORTHO LEFT: ICD-10-PCS | Mod: 26,RT,, | Performed by: RADIOLOGY

## 2022-05-25 PROCEDURE — 99214 PR OFFICE/OUTPT VISIT, EST, LEVL IV, 30-39 MIN: ICD-10-PCS | Mod: 25,S$PBB,, | Performed by: ORTHOPAEDIC SURGERY

## 2022-05-25 PROCEDURE — 20610 LARGE JOINT ASPIRATION/INJECTION: L KNEE: ICD-10-PCS | Mod: S$PBB,LT,, | Performed by: ORTHOPAEDIC SURGERY

## 2022-05-25 PROCEDURE — 99999 PR PBB SHADOW E&M-EST. PATIENT-LVL III: ICD-10-PCS | Mod: PBBFAC,,, | Performed by: ORTHOPAEDIC SURGERY

## 2022-05-25 PROCEDURE — 73562 X-RAY EXAM OF KNEE 3: CPT | Mod: 26,LT,, | Performed by: RADIOLOGY

## 2022-05-25 RX ORDER — TRIAMCINOLONE ACETONIDE 40 MG/ML
40 INJECTION, SUSPENSION INTRA-ARTICULAR; INTRAMUSCULAR
Status: DISCONTINUED | OUTPATIENT
Start: 2022-05-25 | End: 2022-05-25 | Stop reason: HOSPADM

## 2022-05-25 RX ADMIN — TRIAMCINOLONE ACETONIDE 40 MG: 40 INJECTION, SUSPENSION INTRA-ARTICULAR; INTRAMUSCULAR at 09:05

## 2022-05-25 NOTE — PROCEDURES
Large Joint Aspiration/Injection: L knee    Date/Time: 5/25/2022 9:30 AM  Performed by: Azar Michelle MD  Authorized by: Azar Michelle MD     Consent Done?:  Yes (Verbal)  Timeout: prior to procedure the correct patient, procedure, and site was verified    Prep: patient was prepped and draped in usual sterile fashion      Details:  Needle Size:  21 G  Approach:  Anterolateral  Location:  Knee  Site:  L knee  Medications:  40 mg triamcinolone acetonide 40 mg/mL  Patient tolerance:  Patient tolerated the procedure well with no immediate complications

## 2022-05-25 NOTE — PROGRESS NOTES
61 years old left knee pain for weeks time no specific cutting grass no one time traumatic event 6 on good days 6 on bad days rest seems to make it better    Exam shows tenderness the joint line without signs infection instability    X-rays show mild degenerative changes    Assessment:  Left knee chondrosis arthrosis    Plan:  Kenalog injection left knee, home exercise program, follow-up as needed    Imaging studies ordered and reviewed by me    Further History  Aching pain  Worse with activity  Relieved with rest  No other associated symptoms  No other radiation    Further Exam  Alert and oriented  Pleasant  Contralateral limb has appropriate range of motion for age and condition  Contralateral limb has appropriate strength for age and condition  Contralateral limb has appropriate stability  for age and condition  No adenopathy  Pulses are appropriate for current condition  Skin is intact        Chief Complaint    Chief Complaint   Patient presents with    Left Knee - Pain       HPI  Samir Muñoz is a 61 y.o.  male who presents with       Past Medical History  Past Medical History:   Diagnosis Date    Cataract     OD    GERD (gastroesophageal reflux disease)     Hyperlipidemia     Hyperlipidemia     Shingles     involved eye    Sleep apnea     resolved with UPPP       Past Surgical History  Past Surgical History:   Procedure Laterality Date    ADENOIDECTOMY      CATARACT EXTRACTION W/  INTRAOCULAR LENS IMPLANT Left 10/25/2017    Dr Reza    COLONOSCOPY N/A 2/11/2021    Procedure: COLONOSCOPY;  Surgeon: Josué Ray MD;  [facility];  Service: Endoscopy;  Laterality: N/A;    nerve block      for shingles    TONSILLECTOMY      UVULOPALATOPHARYNGOPLASTY         Medications  Current Outpatient Medications   Medication Sig    cetirizine (ZYRTEC) 10 MG tablet Take 10 mg by mouth once daily.    fluticasone propionate (FLONASE) 50 mcg/actuation nasal spray SHAKE LIQUID AND USE 1  SPRAY(50 MCG) IN EACH NOSTRIL EVERY DAY    montelukast (SINGULAIR) 10 mg tablet Take 1 tablet (10 mg total) by mouth every evening.    multivitamin (THERAGRAN) per tablet Take 1 tablet by mouth once daily.     No current facility-administered medications for this visit.       Allergies  Review of patient's allergies indicates:   Allergen Reactions    House dust        Family History  Family History   Problem Relation Age of Onset    Hypertension Father     Autoimmune disease Brother     No Known Problems Daughter     No Known Problems Daughter     No Known Problems Daughter     No Known Problems Daughter     Cancer Maternal Grandmother     Heart attack Paternal Grandfather     Amblyopia Neg Hx     Blindness Neg Hx     Cataracts Neg Hx     Glaucoma Neg Hx     Diabetes Neg Hx     Macular degeneration Neg Hx     Retinal detachment Neg Hx     Strabismus Neg Hx     Stroke Neg Hx     Thyroid disease Neg Hx        Social History  Social History     Socioeconomic History    Marital status:     Number of children: 4   Tobacco Use    Smoking status: Never Smoker    Smokeless tobacco: Never Used   Substance and Sexual Activity    Alcohol use: Yes     Alcohol/week: 3.0 standard drinks     Types: 3 Cans of beer per week     Comment: weekend    Drug use: No    Sexual activity: Yes     Partners: Female               Review of Systems     Constitutional: Negative    HENT: Negative  Eyes: Negative  Respiratory: Negative  Cardiovascular: Negative  Musculoskeletal: HPI  Skin: Negative  Neurological: Negative  Hematological: Negative  Endocrine: Negative                 Physical Exam    There were no vitals filed for this visit.  Body mass index is 32.87 kg/m².  Physical Examination:     General appearance -  well appearing, and in no distress  Mental status - awake  Neck - supple  Chest -  symmetric air entry  Heart - normal rate   Abdomen - soft      Assessment     1. Acute pain of left knee           Plan

## 2022-06-06 ENCOUNTER — OFFICE VISIT (OUTPATIENT)
Dept: ORTHOPEDICS | Facility: CLINIC | Age: 62
End: 2022-06-06
Payer: OTHER GOVERNMENT

## 2022-06-06 VITALS — HEIGHT: 74 IN | WEIGHT: 256 LBS | BODY MASS INDEX: 32.85 KG/M2

## 2022-06-06 DIAGNOSIS — M25.562 LEFT KNEE PAIN: Primary | ICD-10-CM

## 2022-06-06 DIAGNOSIS — M25.562 ACUTE PAIN OF LEFT KNEE: Primary | ICD-10-CM

## 2022-06-06 DIAGNOSIS — M17.12 PRIMARY LOCALIZED OSTEOARTHROSIS OF THE KNEE, LEFT: ICD-10-CM

## 2022-06-06 PROCEDURE — 99213 PR OFFICE/OUTPT VISIT, EST, LEVL III, 20-29 MIN: ICD-10-PCS | Mod: 95,,, | Performed by: ORTHOPAEDIC SURGERY

## 2022-06-06 PROCEDURE — 99213 OFFICE O/P EST LOW 20 MIN: CPT | Mod: 95,,, | Performed by: ORTHOPAEDIC SURGERY

## 2022-06-06 NOTE — PROGRESS NOTES
61 years old seen is a virtual visit today persistent pain in the left knee.  We had given a Kenalog injection on last visit a little over week ago and he had relief for just a short period time symptoms have returned feels that is getting worse pain is 7 on the pain scale difficulty walking    X-rays shows mild degenerative changes    Assessment:  Left knee pain chondrosis synovitis early arthrosis    Plan:  MRI of the knee, we will schedule for viscosupplementation, follow-up after the MRI of his knee

## 2022-06-10 ENCOUNTER — OFFICE VISIT (OUTPATIENT)
Dept: ORTHOPEDICS | Facility: CLINIC | Age: 62
End: 2022-06-10
Payer: OTHER GOVERNMENT

## 2022-06-10 VITALS — HEIGHT: 74 IN | BODY MASS INDEX: 32.85 KG/M2 | WEIGHT: 256 LBS

## 2022-06-10 DIAGNOSIS — M23.222 DERANGEMENT OF POSTERIOR HORN OF MEDIAL MENISCUS DUE TO OLD TEAR OR INJURY, LEFT KNEE: ICD-10-CM

## 2022-06-10 DIAGNOSIS — M17.12 PRIMARY LOCALIZED OSTEOARTHROSIS OF THE KNEE, LEFT: ICD-10-CM

## 2022-06-10 DIAGNOSIS — M25.562 LEFT KNEE PAIN: Primary | ICD-10-CM

## 2022-06-10 DIAGNOSIS — M25.562 LEFT KNEE PAIN, UNSPECIFIED CHRONICITY: ICD-10-CM

## 2022-06-10 PROCEDURE — 99999 PR PBB SHADOW E&M-EST. PATIENT-LVL III: CPT | Mod: PBBFAC,,, | Performed by: ORTHOPAEDIC SURGERY

## 2022-06-10 PROCEDURE — 99213 OFFICE O/P EST LOW 20 MIN: CPT | Mod: PBBFAC,PN | Performed by: ORTHOPAEDIC SURGERY

## 2022-06-10 PROCEDURE — 99214 OFFICE O/P EST MOD 30 MIN: CPT | Mod: S$PBB,,, | Performed by: ORTHOPAEDIC SURGERY

## 2022-06-10 PROCEDURE — 97760 ORTHOTIC MGMT&TRAING 1ST ENC: CPT | Mod: ,,, | Performed by: ORTHOPAEDIC SURGERY

## 2022-06-10 PROCEDURE — 99214 PR OFFICE/OUTPT VISIT, EST, LEVL IV, 30-39 MIN: ICD-10-PCS | Mod: S$PBB,,, | Performed by: ORTHOPAEDIC SURGERY

## 2022-06-10 PROCEDURE — 99999 PR PBB SHADOW E&M-EST. PATIENT-LVL III: ICD-10-PCS | Mod: PBBFAC,,, | Performed by: ORTHOPAEDIC SURGERY

## 2022-06-10 PROCEDURE — 97760 PR ORTHOTIC MGMT&TRAINJ INITIAL ENC EA 15 MINS: ICD-10-PCS | Mod: ,,, | Performed by: ORTHOPAEDIC SURGERY

## 2022-06-10 NOTE — PROGRESS NOTES
61 years old follow-up of his persistent left knee pain.  Present for about month's time started after a lot of activity level walking no one time traumatic event.  Still having discomfort.  We did give him injection feels has gotten worse comes in today using crutches for assistance    Exam shows tenderness the joint line no signs infection positive James testing    MRI shows posterior horn medial meniscal tear as well as degenerative changes of the articular cartilage    Assessment:  Degenerative disease left knee including degenerative meniscal tear    Plan:  schedule him for viscosupplementation single injection, we did discuss them at length possibility of arthroscopic treatment as well.    We performed a custom orthotic/brace fitting, adjusting and training with the patient. The patient demonstrated understanding and proper care. This was performed for 15 minutes.    Imaging studies ordered and reviewed by me    Further History  Aching pain  Worse with activity  Relieved with rest  No other associated symptoms  No other radiation    Further Exam  Alert and oriented  Pleasant  Contralateral limb has appropriate range of motion for age and condition  Contralateral limb has appropriate strength for age and condition  Contralateral limb has appropriate stability  for age and condition  No adenopathy  Pulses are appropriate for current condition  Skin is intact        Chief Complaint    Chief Complaint   Patient presents with    Left Knee - Pain, Swelling       HPI  Samir Muñoz is a 61 y.o.  male who presents with       Past Medical History  Past Medical History:   Diagnosis Date    Cataract     OD    GERD (gastroesophageal reflux disease)     Hyperlipidemia     Hyperlipidemia     Shingles     involved eye    Sleep apnea     resolved with UPPP       Past Surgical History  Past Surgical History:   Procedure Laterality Date    ADENOIDECTOMY      CATARACT EXTRACTION W/  INTRAOCULAR LENS IMPLANT  Left 10/25/2017    Dr Reza    COLONOSCOPY N/A 2/11/2021    Procedure: COLONOSCOPY;  Surgeon: Josué Ray MD;  Location: [facility];  Service: Endoscopy;  Laterality: N/A;    nerve block      for shingles    TONSILLECTOMY      UVULOPALATOPHARYNGOPLASTY         Medications  Current Outpatient Medications   Medication Sig    cetirizine (ZYRTEC) 10 MG tablet Take 10 mg by mouth once daily.    fluticasone propionate (FLONASE) 50 mcg/actuation nasal spray SHAKE LIQUID AND USE 1 SPRAY(50 MCG) IN EACH NOSTRIL EVERY DAY    multivitamin (THERAGRAN) per tablet Take 1 tablet by mouth once daily.     No current facility-administered medications for this visit.       Allergies  Review of patient's allergies indicates:   Allergen Reactions    House dust        Family History  Family History   Problem Relation Age of Onset    Hypertension Father     Autoimmune disease Brother     No Known Problems Daughter     No Known Problems Daughter     No Known Problems Daughter     No Known Problems Daughter     Cancer Maternal Grandmother     Heart attack Paternal Grandfather     Amblyopia Neg Hx     Blindness Neg Hx     Cataracts Neg Hx     Glaucoma Neg Hx     Diabetes Neg Hx     Macular degeneration Neg Hx     Retinal detachment Neg Hx     Strabismus Neg Hx     Stroke Neg Hx     Thyroid disease Neg Hx        Social History  Social History     Socioeconomic History    Marital status:     Number of children: 4   Tobacco Use    Smoking status: Never Smoker    Smokeless tobacco: Never Used   Substance and Sexual Activity    Alcohol use: Yes     Alcohol/week: 3.0 standard drinks     Types: 3 Cans of beer per week     Comment: weekend    Drug use: No    Sexual activity: Yes     Partners: Female               Review of Systems     Constitutional: Negative    HENT: Negative  Eyes: Negative  Respiratory: Negative  Cardiovascular: Negative  Musculoskeletal: HPI  Skin: Negative  Neurological:  Negative  Hematological: Negative  Endocrine: Negative                 Physical Exam    There were no vitals filed for this visit.  Body mass index is 32.87 kg/m².  Physical Examination:     General appearance -  well appearing, and in no distress  Mental status - awake  Neck - supple  Chest -  symmetric air entry  Heart - normal rate   Abdomen - soft      Assessment     1. Left knee pain    2. Primary localized osteoarthrosis of the knee, left    3. Derangement of posterior horn of medial meniscus due to old tear or injury, left knee    4. Left knee pain, unspecified chronicity          Plan

## 2022-06-13 ENCOUNTER — PATIENT MESSAGE (OUTPATIENT)
Dept: ORTHOPEDICS | Facility: CLINIC | Age: 62
End: 2022-06-13
Payer: OTHER GOVERNMENT

## 2022-06-14 ENCOUNTER — PATIENT MESSAGE (OUTPATIENT)
Dept: ORTHOPEDICS | Facility: CLINIC | Age: 62
End: 2022-06-14
Payer: OTHER GOVERNMENT

## 2022-06-16 ENCOUNTER — PATIENT MESSAGE (OUTPATIENT)
Dept: ORTHOPEDICS | Facility: CLINIC | Age: 62
End: 2022-06-16
Payer: OTHER GOVERNMENT

## 2022-06-20 NOTE — PROGRESS NOTES
Physical Therapy Daily Note     Name: Samir Muñoz  Clinic Number: [id number]  Diagnosis:   Encounter Diagnoses   Name Primary?    Chronic pain of left ankle Yes    Difficulty walking     Muscle weakness     Decreased range of motion of left ankle      Physician: Azar Michelle MD    Treatment Orders: PT Eval and Treat    Past Medical History:   Diagnosis Date    Cataract     OD    GERD (gastroesophageal reflux disease)     Hyperlipidemia     Hyperlipidemia     Shingles     involved eye    Sleep apnea     resolved with UPPP     Medical Diagnosis: Achilles tendon rupture     Precautions: precautions for Achilles, R knee pain  Evaluation date: 6/25/2019  Date of Injury:  5-17-19  Visit # authorized: 4/16  Authorization period: 10-17-19  Plan of care expiration: 8-6-19  MD Follow up appt:  then 8-1-19       Time In:  4:05  Time Out:  5:20  Total Billable Time:  55 min    Subjective     Pt reports: tape helped.  Pt has been sitting and driving a lot  L ankle is still a little swollen maybe due to being at his desk but pain is better overall.  Pt was compliant with home exercise program.  Response to previous treatment: felt fine  Functional change: walking better from ankle limp a couple of steps and then gets more normal    Location: left ankle    Pain Scale: before treatment: 0 in ankle  currently; after treatment: and 0 in ankle    Objective     8 weeks post injury on 7-12-19    Ankle Girth: (measured in centimeters) 7-18-19  Ankle LLE   Mid malleoli 30.5   Mid foot 26.5   MT heads 26.6   Mid calf  44.0         Ankle Girth: (measured in centimeters) 7-8-19  Ankle LLE   Mid malleoli 30.6   Mid foot 28.0   MT heads 26.8   Mid calf  43.8      Ankle Girth: (measured in centimeters)  initial eval  Ankle RLE LLE   Mid malleoli 28.4 30.5   Mid foot 27.8 28.5   MT heads 27.2 27.2   Mid calf  45.7 44.2      Ankle ROM: (measured in degrees) 7-8-19  Ankle LLE   Dorsiflexion with knees straight 10  "  Dorsiflexion with knees bent 15   Plantarflexion 35   Inversion 30   Eversion 20         Ankle ROM: (measured in degrees) initial eval  Ankle RLE LLE   Dorsiflexion with knees straight 10 10   Dorsiflexion with knees bent 15 10   Plantarflexion 45 30   Inversion 30 25   Eversion 20 15             TREATMENT  Therapeutic exercise: Samir received therapeutic exercises to develop strength, endurance, ROM, flexibility and core stabilization for 45 minutes including:   Instructed pt to elevate leg at least to hip height while sitting to avoid swelling and to try and get up and walk around often to help keep increased circulation    Ankle pumps x 10  Inversion/Eversion x 10  Ankle theraband all planes x 20 with GTB emphasis on eccentric also  HSS sitting on table x 10  GSS with strap x 10  Reiterated not overstretching   Heel raises sitting pain free zone pt performing about 25% range  X 10  Emphasis on pain free ROM Worked with pt on maintaining toes flat and MTP extension   Improved ability to perform heel raises sitting      minisquat x 20    Single leg balance x 30 sec x 1  20 B heel raises 60% R 40% L, worked with pt on proper form to keep knee extended and not lean through hands and to shift weight appropriately with emphasis on pain free zone     Wobble board level 1 x 10 balance 1 min   Foam walk    Instructed pt to elevate leg at end of day 20-30 min        Manual therapy: Samir  received the following manual therapy techniques x 10 min. to include soft tissue and joint mobilization were applied to the: toes, forefoot and mid foot to include: gentle mob   (NP)Pt received tool-assisted massage with manual therapy techniques to R HS to trigger an inflammatory healing response and stimulate the production of new collagen and proper, more functional, less painful healing.    2 16" fans with kinesiotape was provided to the knee to decrease swelling .  Instructed pt in use, care and precautions with tape.        "      Modalities: Pt. received cold pack x 10 min. to L ankle elevated following treatment.       Written Home Exercises Provided: yes.  Exercises were reviewed and Marvin was able to demonstrate them prior to the end of the session.  Marvin demonstrated good  understanding of the education provided.     See EMR under Patient Instructions for exercises provided today.      Pt. education:  · Posture reeducation, body mechanics, HEP,   · No spiritual or educational barriers to learning provided  · Pt has no cultural, educational or language barriers to learning provided.    Assessment   Pt appears to understand need to avoid sustained dependent position.  Pt may benefit from taping again.  Able to progress with CKC and did well with no adverse effect    Marvin is progressing well towards his goals.   Pt prognosis is Good.       Pt will continue to benefit from skilled outpatient physical therapy to address the remaining functional deficits, provide pt/family education, and to maximize pt's level of independence in the home and community environment. .     GOALS:   Short Term Goals:  3 weeks  Increase range of motion 25%  Increase strength 1/2 muscle grade  Be able to perform HEP with minimal cueing required     Long Term Goals: 6 weeks  Increase range of motion to 75% to 100% full   Improve muscle strength 1 muscle grade  Improve muscle strength with MMT to 4+/5 to 5/5  Restore ability to ambulate with normal pain free gait  Walking for ADL and exercise will be restored without increased pain  Restore ability to stand for ADL without increased pain  Restore normal sleep habits without disturbances due to pain  Restore ability to perform ADL's and household activities independently and without increased pain    Anticipated barriers to physical therapy: right knee pain  Pt's spiritual, cultural and educational needs considered and pt agreeable to plan of care and goals        Plan   Continue with established Plan of Care  towards PT goals.     Rae Gibson, PT    No

## 2022-06-23 ENCOUNTER — TELEPHONE (OUTPATIENT)
Dept: ORTHOPEDICS | Facility: CLINIC | Age: 62
End: 2022-06-23
Payer: OTHER GOVERNMENT

## 2022-06-23 ENCOUNTER — PATIENT MESSAGE (OUTPATIENT)
Dept: ORTHOPEDICS | Facility: CLINIC | Age: 62
End: 2022-06-23
Payer: OTHER GOVERNMENT

## 2022-06-23 NOTE — TELEPHONE ENCOUNTER
Contact  Marvin. Informed him his gel injection hasn't been approved as of this evening. I will call him when we get the approval. Patient verbalized understanding.

## 2022-06-27 ENCOUNTER — OFFICE VISIT (OUTPATIENT)
Dept: ORTHOPEDICS | Facility: CLINIC | Age: 62
End: 2022-06-27
Payer: OTHER GOVERNMENT

## 2022-06-27 VITALS — WEIGHT: 256 LBS | HEIGHT: 74 IN | BODY MASS INDEX: 32.85 KG/M2

## 2022-06-27 DIAGNOSIS — M17.12 PRIMARY LOCALIZED OSTEOARTHROSIS OF THE KNEE, LEFT: ICD-10-CM

## 2022-06-27 DIAGNOSIS — M25.562 LEFT KNEE PAIN: Primary | ICD-10-CM

## 2022-06-27 PROCEDURE — 20610 LARGE JOINT ASPIRATION/INJECTION: L KNEE: ICD-10-PCS | Mod: S$PBB,LT,, | Performed by: ORTHOPAEDIC SURGERY

## 2022-06-27 PROCEDURE — 99499 NO LOS: ICD-10-PCS | Mod: S$PBB,,, | Performed by: ORTHOPAEDIC SURGERY

## 2022-06-27 PROCEDURE — 99999 PR PBB SHADOW E&M-EST. PATIENT-LVL II: CPT | Mod: PBBFAC,,, | Performed by: ORTHOPAEDIC SURGERY

## 2022-06-27 PROCEDURE — 99999 PR PBB SHADOW E&M-EST. PATIENT-LVL II: ICD-10-PCS | Mod: PBBFAC,,, | Performed by: ORTHOPAEDIC SURGERY

## 2022-06-27 PROCEDURE — 99499 UNLISTED E&M SERVICE: CPT | Mod: S$PBB,,, | Performed by: ORTHOPAEDIC SURGERY

## 2022-06-27 PROCEDURE — 99212 OFFICE O/P EST SF 10 MIN: CPT | Mod: PBBFAC,PN | Performed by: ORTHOPAEDIC SURGERY

## 2022-06-27 PROCEDURE — 20610 DRAIN/INJ JOINT/BURSA W/O US: CPT | Mod: PBBFAC,PN,LT | Performed by: ORTHOPAEDIC SURGERY

## 2022-06-27 RX ADMIN — Medication 20 MG: at 09:06

## 2022-07-05 ENCOUNTER — PATIENT MESSAGE (OUTPATIENT)
Dept: ORTHOPEDICS | Facility: CLINIC | Age: 62
End: 2022-07-05
Payer: OTHER GOVERNMENT

## 2022-07-07 ENCOUNTER — OFFICE VISIT (OUTPATIENT)
Dept: ORTHOPEDICS | Facility: CLINIC | Age: 62
End: 2022-07-07
Payer: OTHER GOVERNMENT

## 2022-07-07 VITALS — HEIGHT: 74 IN | BODY MASS INDEX: 32.85 KG/M2 | WEIGHT: 256 LBS

## 2022-07-07 DIAGNOSIS — M25.562 LEFT KNEE PAIN, UNSPECIFIED CHRONICITY: Primary | ICD-10-CM

## 2022-07-07 PROCEDURE — 99213 OFFICE O/P EST LOW 20 MIN: CPT | Mod: PBBFAC,PN,25 | Performed by: ORTHOPAEDIC SURGERY

## 2022-07-07 PROCEDURE — 20610 DRAIN/INJ JOINT/BURSA W/O US: CPT | Mod: PBBFAC,PN | Performed by: ORTHOPAEDIC SURGERY

## 2022-07-07 PROCEDURE — 99999 PR PBB SHADOW E&M-EST. PATIENT-LVL III: CPT | Mod: PBBFAC,,, | Performed by: ORTHOPAEDIC SURGERY

## 2022-07-07 PROCEDURE — 99499 NO LOS: ICD-10-PCS | Mod: S$PBB,,, | Performed by: ORTHOPAEDIC SURGERY

## 2022-07-07 PROCEDURE — 99999 PR PBB SHADOW E&M-EST. PATIENT-LVL III: ICD-10-PCS | Mod: PBBFAC,,, | Performed by: ORTHOPAEDIC SURGERY

## 2022-07-07 PROCEDURE — 99499 UNLISTED E&M SERVICE: CPT | Mod: S$PBB,,, | Performed by: ORTHOPAEDIC SURGERY

## 2022-07-07 PROCEDURE — 20610 LARGE JOINT ASPIRATION/INJECTION: L KNEE: ICD-10-PCS | Mod: S$PBB,LT,, | Performed by: ORTHOPAEDIC SURGERY

## 2022-07-07 RX ADMIN — Medication 20 MG: at 01:07

## 2022-07-07 NOTE — PROCEDURES
Large Joint Aspiration/Injection: L knee    Date/Time: 7/7/2022 1:15 PM  Performed by: Azar Michelle MD  Authorized by: Azar Michelle MD     Consent Done?:  Yes (Verbal)  Timeout: prior to procedure the correct patient, procedure, and site was verified    Prep: patient was prepped and draped in usual sterile fashion      Details:  Needle Size:  21 G  Approach:  Anterolateral  Location:  Knee  Site:  L knee  Medications:  20 mg sodium hyaluronate (EUFLEXXA) 10 mg/mL(mw 2.4 -3.6 million)  Patient tolerance:  Patient tolerated the procedure well with no immediate complications

## 2022-07-11 ENCOUNTER — OFFICE VISIT (OUTPATIENT)
Dept: ORTHOPEDICS | Facility: CLINIC | Age: 62
End: 2022-07-11
Payer: OTHER GOVERNMENT

## 2022-07-11 VITALS — WEIGHT: 256 LBS | BODY MASS INDEX: 32.85 KG/M2 | HEIGHT: 74 IN

## 2022-07-11 DIAGNOSIS — M17.12 OSTEOARTHRITIS OF LEFT KNEE, UNSPECIFIED OSTEOARTHRITIS TYPE: Primary | ICD-10-CM

## 2022-07-11 PROCEDURE — 20610 LARGE JOINT ASPIRATION/INJECTION: L KNEE: ICD-10-PCS | Mod: S$PBB,LT,, | Performed by: ORTHOPAEDIC SURGERY

## 2022-07-11 PROCEDURE — 99212 OFFICE O/P EST SF 10 MIN: CPT | Mod: PBBFAC,PN,25 | Performed by: ORTHOPAEDIC SURGERY

## 2022-07-11 PROCEDURE — 99499 UNLISTED E&M SERVICE: CPT | Mod: S$PBB,,, | Performed by: ORTHOPAEDIC SURGERY

## 2022-07-11 PROCEDURE — 99499 NO LOS: ICD-10-PCS | Mod: S$PBB,,, | Performed by: ORTHOPAEDIC SURGERY

## 2022-07-11 PROCEDURE — 99999 PR PBB SHADOW E&M-EST. PATIENT-LVL II: CPT | Mod: PBBFAC,,, | Performed by: ORTHOPAEDIC SURGERY

## 2022-07-11 PROCEDURE — 20610 DRAIN/INJ JOINT/BURSA W/O US: CPT | Mod: PBBFAC,PN,LT | Performed by: ORTHOPAEDIC SURGERY

## 2022-07-11 PROCEDURE — 99999 PR PBB SHADOW E&M-EST. PATIENT-LVL II: ICD-10-PCS | Mod: PBBFAC,,, | Performed by: ORTHOPAEDIC SURGERY

## 2022-07-11 RX ADMIN — Medication 20 MG: at 02:07

## 2022-07-11 NOTE — PROCEDURES
Large Joint Aspiration/Injection: L knee    Date/Time: 7/11/2022 2:00 PM  Performed by: Azar Michelle MD  Authorized by: Azar Michelle MD     Consent Done?:  Yes (Verbal)  Timeout: prior to procedure the correct patient, procedure, and site was verified    Prep: patient was prepped and draped in usual sterile fashion      Details:  Needle Size:  21 G  Approach:  Anterolateral  Location:  Knee  Site:  L knee  Medications:  20 mg sodium hyaluronate (EUFLEXXA) 10 mg/mL(mw 2.4 -3.6 million)  Patient tolerance:  Patient tolerated the procedure well with no immediate complications

## 2022-07-24 ENCOUNTER — TELEPHONE (OUTPATIENT)
Dept: FAMILY MEDICINE | Facility: CLINIC | Age: 62
End: 2022-07-24
Payer: OTHER GOVERNMENT

## 2022-07-26 ENCOUNTER — PATIENT MESSAGE (OUTPATIENT)
Dept: FAMILY MEDICINE | Facility: CLINIC | Age: 62
End: 2022-07-26
Payer: OTHER GOVERNMENT

## 2022-08-01 ENCOUNTER — PATIENT MESSAGE (OUTPATIENT)
Dept: ORTHOPEDICS | Facility: CLINIC | Age: 62
End: 2022-08-01
Payer: OTHER GOVERNMENT

## 2022-08-05 ENCOUNTER — OFFICE VISIT (OUTPATIENT)
Dept: FAMILY MEDICINE | Facility: CLINIC | Age: 62
End: 2022-08-05
Payer: OTHER GOVERNMENT

## 2022-08-05 VITALS
DIASTOLIC BLOOD PRESSURE: 72 MMHG | WEIGHT: 254.88 LBS | HEIGHT: 74 IN | HEART RATE: 98 BPM | BODY MASS INDEX: 32.71 KG/M2 | OXYGEN SATURATION: 95 % | SYSTOLIC BLOOD PRESSURE: 118 MMHG

## 2022-08-05 DIAGNOSIS — G47.33 OBSTRUCTIVE SLEEP APNEA SYNDROME: ICD-10-CM

## 2022-08-05 DIAGNOSIS — L98.9 SKIN LESION OF FACE: Primary | ICD-10-CM

## 2022-08-05 PROCEDURE — 99213 PR OFFICE/OUTPT VISIT, EST, LEVL III, 20-29 MIN: ICD-10-PCS | Mod: S$PBB,,, | Performed by: PHYSICIAN ASSISTANT

## 2022-08-05 PROCEDURE — 99213 OFFICE O/P EST LOW 20 MIN: CPT | Mod: S$PBB,,, | Performed by: PHYSICIAN ASSISTANT

## 2022-08-05 PROCEDURE — 99999 PR PBB SHADOW E&M-EST. PATIENT-LVL IV: CPT | Mod: PBBFAC,,, | Performed by: PHYSICIAN ASSISTANT

## 2022-08-05 PROCEDURE — 99999 PR PBB SHADOW E&M-EST. PATIENT-LVL IV: ICD-10-PCS | Mod: PBBFAC,,, | Performed by: PHYSICIAN ASSISTANT

## 2022-08-05 PROCEDURE — 99214 OFFICE O/P EST MOD 30 MIN: CPT | Mod: PBBFAC,PO | Performed by: PHYSICIAN ASSISTANT

## 2022-08-05 RX ORDER — MONTELUKAST SODIUM 10 MG/1
10 TABLET ORAL DAILY
COMMUNITY
Start: 2022-07-30

## 2022-08-05 NOTE — PROGRESS NOTES
"Subjective:      Patient ID: Samir Muñoz is a 61 y.o. male.    Chief Complaint: Follow-up (Need referral to Derm.  Sunspots on face )    Patient is new to me.    HPI   Patient has PMH of post herpetic neuralgia, HLD, BPH, GERD, and EMELY.    Patient reports that he is having red spots on temples that are cracking for the last 6 weeks and would like to see dermatology.    Wakes up frequently from shortness of breath "not getting enough air" and has to rip off CPAP machine.  Worsening over the past 2 years.    Review of Systems   Constitutional: Negative for appetite change, chills and fever.   Respiratory: Negative for shortness of breath.    Cardiovascular: Negative for chest pain.   Skin:        Lesions on temples   Psychiatric/Behavioral: Positive for sleep disturbance (CPAP is waking him up).       Objective:   /72   Pulse 98   Ht 6' 2" (1.88 m)   Wt 115.6 kg (254 lb 13.6 oz)   SpO2 95%   BMI 32.72 kg/m²      Physical Exam  Vitals reviewed.   Constitutional:       General: He is not in acute distress.     Appearance: Normal appearance. He is well-developed.   HENT:      Head: Normocephalic and atraumatic.      Right Ear: External ear normal.      Left Ear: External ear normal.   Eyes:      Conjunctiva/sclera: Conjunctivae normal.   Cardiovascular:      Rate and Rhythm: Normal rate and regular rhythm.      Heart sounds: Normal heart sounds. No murmur heard.    No friction rub. No gallop.   Pulmonary:      Effort: Pulmonary effort is normal. No respiratory distress.      Breath sounds: Normal breath sounds. No wheezing or rales.   Musculoskeletal:         General: Normal range of motion.      Cervical back: Normal range of motion.   Skin:     General: Skin is warm and dry.      Findings: Lesion (small lesions on each temple that do not heal) present. No rash.   Neurological:      General: No focal deficit present.      Mental Status: He is alert and oriented to person, place, and time. "   Psychiatric:         Mood and Affect: Mood normal.         Behavior: Behavior normal.         Judgment: Judgment normal.        Assessment:      1. Skin lesion of face    2. Obstructive sleep apnea syndrome       Plan:   1. Skin lesion of face  - Ambulatory referral/consult to Dermatology; Future    2. Obstructive sleep apnea syndrome  Suspect he needs CPAP settings adjusted.  - Ambulatory referral/consult to Sleep Disorders; Future    Follow up as needed.  Patient agreed with plan and expressed understanding.    Thank you for allowing me to serve you,

## 2022-08-19 ENCOUNTER — PATIENT MESSAGE (OUTPATIENT)
Dept: ORTHOPEDICS | Facility: CLINIC | Age: 62
End: 2022-08-19

## 2022-08-22 ENCOUNTER — OFFICE VISIT (OUTPATIENT)
Dept: ORTHOPEDICS | Facility: CLINIC | Age: 62
End: 2022-08-22
Payer: OTHER GOVERNMENT

## 2022-08-22 VITALS — WEIGHT: 254 LBS | BODY MASS INDEX: 32.6 KG/M2 | HEIGHT: 74 IN

## 2022-08-22 DIAGNOSIS — M23.222 DERANGEMENT OF POSTERIOR HORN OF MEDIAL MENISCUS DUE TO OLD TEAR OR INJURY, LEFT KNEE: ICD-10-CM

## 2022-08-22 DIAGNOSIS — M17.12 PRIMARY LOCALIZED OSTEOARTHROSIS OF THE KNEE, LEFT: Primary | ICD-10-CM

## 2022-08-22 PROCEDURE — 99999 PR PBB SHADOW E&M-EST. PATIENT-LVL III: ICD-10-PCS | Mod: PBBFAC,,, | Performed by: ORTHOPAEDIC SURGERY

## 2022-08-22 PROCEDURE — 99213 OFFICE O/P EST LOW 20 MIN: CPT | Mod: PBBFAC,PN | Performed by: ORTHOPAEDIC SURGERY

## 2022-08-22 PROCEDURE — 99214 PR OFFICE/OUTPT VISIT, EST, LEVL IV, 30-39 MIN: ICD-10-PCS | Mod: 57,S$PBB,, | Performed by: ORTHOPAEDIC SURGERY

## 2022-08-22 PROCEDURE — 99999 PR PBB SHADOW E&M-EST. PATIENT-LVL III: CPT | Mod: PBBFAC,,, | Performed by: ORTHOPAEDIC SURGERY

## 2022-08-22 PROCEDURE — 99214 OFFICE O/P EST MOD 30 MIN: CPT | Mod: 57,S$PBB,, | Performed by: ORTHOPAEDIC SURGERY

## 2022-08-22 NOTE — PROGRESS NOTES
61 years old recurrent persistent pain of the left knee despite nonoperative measures.  Found have a posterior horn medial meniscal tear on MRI with other degenerative changes noted.    Exam shows positive James testing no signs infection instability     Imaging reveals degenerative meniscal tear    Assessment: Degenerative meniscal tear left knee    Plan: We will schedule the patient for knee arthroscopy with partial meniscectomy, chondroplasty.  Patient is aware the risks and limitations of surgery and still wants to proceed     Imaging studies ordered and reviewed by me    Further History  Aching pain  Worse with activity  Relieved with rest  No other associated symptoms  No other radiation    Further Exam  Alert and oriented  Pleasant  Contralateral limb has appropriate range of motion for age and condition  Contralateral limb has appropriate strength for age and condition  Contralateral limb has appropriate stability  for age and condition  No adenopathy  Pulses are appropriate for current condition  Skin is intact        Chief Complaint    Chief Complaint   Patient presents with    Left Knee - Pain       HPI  Samir Muñoz is a 61 y.o.  male who presents with       Past Medical History  Past Medical History:   Diagnosis Date    Cataract     OD    GERD (gastroesophageal reflux disease)     Hyperlipidemia     Hyperlipidemia     Shingles     involved eye    Sleep apnea     resolved with UPPP       Past Surgical History  Past Surgical History:   Procedure Laterality Date    ADENOIDECTOMY      CATARACT EXTRACTION W/  INTRAOCULAR LENS IMPLANT Left 10/25/2017    Dr Reza    COLONOSCOPY N/A 2/11/2021    Procedure: COLONOSCOPY;  Surgeon: Josué Ray MD;  Location: [facility];  Service: Endoscopy;  Laterality: N/A;    nerve block      for shingles    TONSILLECTOMY      UVULOPALATOPHARYNGOPLASTY         Medications  Current Outpatient Medications   Medication Sig    cetirizine (ZYRTEC)  10 MG tablet Take 10 mg by mouth once daily.    montelukast (SINGULAIR) 10 mg tablet Take 10 mg by mouth once daily.    multivitamin (THERAGRAN) per tablet Take 1 tablet by mouth once daily.     No current facility-administered medications for this visit.       Allergies  Review of patient's allergies indicates:   Allergen Reactions    House dust        Family History  Family History   Problem Relation Age of Onset    Hypertension Father     Autoimmune disease Brother     No Known Problems Daughter     No Known Problems Daughter     No Known Problems Daughter     No Known Problems Daughter     Cancer Maternal Grandmother     Heart attack Paternal Grandfather     Amblyopia Neg Hx     Blindness Neg Hx     Cataracts Neg Hx     Glaucoma Neg Hx     Diabetes Neg Hx     Macular degeneration Neg Hx     Retinal detachment Neg Hx     Strabismus Neg Hx     Stroke Neg Hx     Thyroid disease Neg Hx        Social History  Social History     Socioeconomic History    Marital status:     Number of children: 4   Tobacco Use    Smoking status: Never Smoker    Smokeless tobacco: Never Used   Substance and Sexual Activity    Alcohol use: Yes     Alcohol/week: 3.0 standard drinks     Types: 3 Cans of beer per week     Comment: weekend    Drug use: No    Sexual activity: Yes     Partners: Female               Review of Systems     Constitutional: Negative    HENT: Negative  Eyes: Negative  Respiratory: Negative  Cardiovascular: Negative  Musculoskeletal: HPI  Skin: Negative  Neurological: Negative  Hematological: Negative  Endocrine: Negative                 Physical Exam    There were no vitals filed for this visit.  Body mass index is 32.61 kg/m².  Physical Examination:     General appearance -  well appearing, and in no distress  Mental status - awake  Neck - supple  Chest -  symmetric air entry  Heart - normal rate   Abdomen - soft      Assessment     1. Primary localized osteoarthrosis of the knee,  left    2. Derangement of posterior horn of medial meniscus due to old tear or injury, left knee          Plan

## 2022-08-22 NOTE — H&P (VIEW-ONLY)
61 years old recurrent persistent pain of the left knee despite nonoperative measures.  Found have a posterior horn medial meniscal tear on MRI with other degenerative changes noted.    Exam shows positive James testing no signs infection instability     Imaging reveals degenerative meniscal tear    Assessment: Degenerative meniscal tear left knee    Plan: We will schedule the patient for knee arthroscopy with partial meniscectomy, chondroplasty.  Patient is aware the risks and limitations of surgery and still wants to proceed     Imaging studies ordered and reviewed by me    Further History  Aching pain  Worse with activity  Relieved with rest  No other associated symptoms  No other radiation    Further Exam  Alert and oriented  Pleasant  Contralateral limb has appropriate range of motion for age and condition  Contralateral limb has appropriate strength for age and condition  Contralateral limb has appropriate stability  for age and condition  No adenopathy  Pulses are appropriate for current condition  Skin is intact        Chief Complaint    Chief Complaint   Patient presents with    Left Knee - Pain       HPI  Samir Muñoz is a 61 y.o.  male who presents with       Past Medical History  Past Medical History:   Diagnosis Date    Cataract     OD    GERD (gastroesophageal reflux disease)     Hyperlipidemia     Hyperlipidemia     Shingles     involved eye    Sleep apnea     resolved with UPPP       Past Surgical History  Past Surgical History:   Procedure Laterality Date    ADENOIDECTOMY      CATARACT EXTRACTION W/  INTRAOCULAR LENS IMPLANT Left 10/25/2017    Dr Reza    COLONOSCOPY N/A 2/11/2021    Procedure: COLONOSCOPY;  Surgeon: Josué Ray MD;  Location: [facility];  Service: Endoscopy;  Laterality: N/A;    nerve block      for shingles    TONSILLECTOMY      UVULOPALATOPHARYNGOPLASTY         Medications  Current Outpatient Medications   Medication Sig    cetirizine (ZYRTEC)  10 MG tablet Take 10 mg by mouth once daily.    montelukast (SINGULAIR) 10 mg tablet Take 10 mg by mouth once daily.    multivitamin (THERAGRAN) per tablet Take 1 tablet by mouth once daily.     No current facility-administered medications for this visit.       Allergies  Review of patient's allergies indicates:   Allergen Reactions    House dust        Family History  Family History   Problem Relation Age of Onset    Hypertension Father     Autoimmune disease Brother     No Known Problems Daughter     No Known Problems Daughter     No Known Problems Daughter     No Known Problems Daughter     Cancer Maternal Grandmother     Heart attack Paternal Grandfather     Amblyopia Neg Hx     Blindness Neg Hx     Cataracts Neg Hx     Glaucoma Neg Hx     Diabetes Neg Hx     Macular degeneration Neg Hx     Retinal detachment Neg Hx     Strabismus Neg Hx     Stroke Neg Hx     Thyroid disease Neg Hx        Social History  Social History     Socioeconomic History    Marital status:     Number of children: 4   Tobacco Use    Smoking status: Never Smoker    Smokeless tobacco: Never Used   Substance and Sexual Activity    Alcohol use: Yes     Alcohol/week: 3.0 standard drinks     Types: 3 Cans of beer per week     Comment: weekend    Drug use: No    Sexual activity: Yes     Partners: Female               Review of Systems     Constitutional: Negative    HENT: Negative  Eyes: Negative  Respiratory: Negative  Cardiovascular: Negative  Musculoskeletal: HPI  Skin: Negative  Neurological: Negative  Hematological: Negative  Endocrine: Negative                 Physical Exam    There were no vitals filed for this visit.  Body mass index is 32.61 kg/m².  Physical Examination:     General appearance -  well appearing, and in no distress  Mental status - awake  Neck - supple  Chest -  symmetric air entry  Heart - normal rate   Abdomen - soft      Assessment     1. Primary localized osteoarthrosis of the knee,  left    2. Derangement of posterior horn of medial meniscus due to old tear or injury, left knee          Plan

## 2022-08-23 DIAGNOSIS — M17.12 PRIMARY LOCALIZED OSTEOARTHROSIS OF THE KNEE, LEFT: Primary | ICD-10-CM

## 2022-08-23 DIAGNOSIS — M23.222 DERANGEMENT OF POSTERIOR HORN OF MEDIAL MENISCUS DUE TO OLD TEAR OR INJURY, LEFT KNEE: ICD-10-CM

## 2022-08-23 DIAGNOSIS — M23.204 DEGENERATIVE TEAR OF LEFT MEDIAL MENISCUS: ICD-10-CM

## 2022-08-23 RX ORDER — MUPIROCIN 20 MG/G
OINTMENT TOPICAL
Status: CANCELLED | OUTPATIENT
Start: 2022-08-23

## 2022-08-24 ENCOUNTER — ANESTHESIA EVENT (OUTPATIENT)
Dept: SURGERY | Facility: HOSPITAL | Age: 62
End: 2022-08-24
Payer: OTHER GOVERNMENT

## 2022-08-24 DIAGNOSIS — M25.562 LEFT KNEE PAIN: ICD-10-CM

## 2022-08-24 DIAGNOSIS — M23.204 DEGENERATIVE TEAR OF LEFT MEDIAL MENISCUS: Primary | ICD-10-CM

## 2022-08-24 DIAGNOSIS — M23.222 DERANGEMENT OF POSTERIOR HORN OF MEDIAL MENISCUS DUE TO OLD TEAR OR INJURY, LEFT KNEE: ICD-10-CM

## 2022-08-24 RX ORDER — OXYCODONE AND ACETAMINOPHEN 5; 325 MG/1; MG/1
1 TABLET ORAL
Qty: 42 TABLET | Refills: 0 | Status: SHIPPED | OUTPATIENT
Start: 2022-08-24 | End: 2022-09-22

## 2022-08-25 ENCOUNTER — HOSPITAL ENCOUNTER (OUTPATIENT)
Facility: HOSPITAL | Age: 62
Discharge: HOME | End: 2022-08-25
Attending: ORTHOPAEDIC SURGERY | Admitting: ORTHOPAEDIC SURGERY
Payer: OTHER GOVERNMENT

## 2022-08-25 ENCOUNTER — ANESTHESIA (OUTPATIENT)
Dept: SURGERY | Facility: HOSPITAL | Age: 62
End: 2022-08-25
Payer: OTHER GOVERNMENT

## 2022-08-25 DIAGNOSIS — M17.12 PRIMARY LOCALIZED OSTEOARTHROSIS OF THE KNEE, LEFT: ICD-10-CM

## 2022-08-25 DIAGNOSIS — M23.222 DERANGEMENT OF POSTERIOR HORN OF MEDIAL MENISCUS DUE TO OLD TEAR OR INJURY, LEFT KNEE: ICD-10-CM

## 2022-08-25 DIAGNOSIS — M23.204 DEGENERATIVE TEAR OF LEFT MEDIAL MENISCUS: ICD-10-CM

## 2022-08-25 PROCEDURE — 71000033 HC RECOVERY, INTIAL HOUR: Mod: PO | Performed by: ORTHOPAEDIC SURGERY

## 2022-08-25 PROCEDURE — 63600175 PHARM REV CODE 636 W HCPCS: Mod: PO | Performed by: ORTHOPAEDIC SURGERY

## 2022-08-25 PROCEDURE — 37000009 HC ANESTHESIA EA ADD 15 MINS: Mod: PO | Performed by: ORTHOPAEDIC SURGERY

## 2022-08-25 PROCEDURE — D9220A PRA ANESTHESIA: ICD-10-PCS | Mod: CRNA,,, | Performed by: NURSE ANESTHETIST, CERTIFIED REGISTERED

## 2022-08-25 PROCEDURE — 37000008 HC ANESTHESIA 1ST 15 MINUTES: Mod: PO | Performed by: ORTHOPAEDIC SURGERY

## 2022-08-25 PROCEDURE — 25000003 PHARM REV CODE 250: Mod: PO | Performed by: ORTHOPAEDIC SURGERY

## 2022-08-25 PROCEDURE — 29881 ARTHRS KNE SRG MNISECTMY M/L: CPT | Mod: LT,,, | Performed by: ORTHOPAEDIC SURGERY

## 2022-08-25 PROCEDURE — 36000711: Mod: PO | Performed by: ORTHOPAEDIC SURGERY

## 2022-08-25 PROCEDURE — 29881 PR KNEE SCOPE SINGLE MENISECECTOMY: ICD-10-PCS | Mod: LT,,, | Performed by: ORTHOPAEDIC SURGERY

## 2022-08-25 PROCEDURE — D9220A PRA ANESTHESIA: Mod: ANES,,, | Performed by: ANESTHESIOLOGY

## 2022-08-25 PROCEDURE — D9220A PRA ANESTHESIA: Mod: CRNA,,, | Performed by: NURSE ANESTHETIST, CERTIFIED REGISTERED

## 2022-08-25 PROCEDURE — 63600175 PHARM REV CODE 636 W HCPCS: Mod: PO | Performed by: NURSE ANESTHETIST, CERTIFIED REGISTERED

## 2022-08-25 PROCEDURE — 01400 ANES OPN/ARTHRS KNEE JT NOS: CPT | Mod: PO | Performed by: ORTHOPAEDIC SURGERY

## 2022-08-25 PROCEDURE — 27201423 OPTIME MED/SURG SUP & DEVICES STERILE SUPPLY: Mod: PO | Performed by: ORTHOPAEDIC SURGERY

## 2022-08-25 PROCEDURE — 63600175 PHARM REV CODE 636 W HCPCS: Mod: PO | Performed by: ANESTHESIOLOGY

## 2022-08-25 PROCEDURE — 71000015 HC POSTOP RECOV 1ST HR: Mod: PO | Performed by: ORTHOPAEDIC SURGERY

## 2022-08-25 PROCEDURE — 25000003 PHARM REV CODE 250: Mod: PO | Performed by: NURSE ANESTHETIST, CERTIFIED REGISTERED

## 2022-08-25 PROCEDURE — D9220A PRA ANESTHESIA: ICD-10-PCS | Mod: ANES,,, | Performed by: ANESTHESIOLOGY

## 2022-08-25 PROCEDURE — 36000710: Mod: PO | Performed by: ORTHOPAEDIC SURGERY

## 2022-08-25 RX ORDER — HYDROMORPHONE HYDROCHLORIDE 2 MG/ML
0.2 INJECTION, SOLUTION INTRAMUSCULAR; INTRAVENOUS; SUBCUTANEOUS EVERY 5 MIN PRN
Status: DISCONTINUED | OUTPATIENT
Start: 2022-08-25 | End: 2022-08-25 | Stop reason: HOSPADM

## 2022-08-25 RX ORDER — LIDOCAINE HYDROCHLORIDE 10 MG/ML
1 INJECTION, SOLUTION EPIDURAL; INFILTRATION; INTRACAUDAL; PERINEURAL ONCE
Status: DISCONTINUED | OUTPATIENT
Start: 2022-08-25 | End: 2022-08-25 | Stop reason: HOSPADM

## 2022-08-25 RX ORDER — ONDANSETRON 2 MG/ML
INJECTION INTRAMUSCULAR; INTRAVENOUS
Status: DISCONTINUED | OUTPATIENT
Start: 2022-08-25 | End: 2022-08-25

## 2022-08-25 RX ORDER — MUPIROCIN 20 MG/G
OINTMENT TOPICAL
Status: DISCONTINUED | OUTPATIENT
Start: 2022-08-25 | End: 2022-08-25 | Stop reason: HOSPADM

## 2022-08-25 RX ORDER — PROPOFOL 10 MG/ML
VIAL (ML) INTRAVENOUS
Status: DISCONTINUED | OUTPATIENT
Start: 2022-08-25 | End: 2022-08-25

## 2022-08-25 RX ORDER — FENTANYL CITRATE 50 UG/ML
25 INJECTION, SOLUTION INTRAMUSCULAR; INTRAVENOUS EVERY 5 MIN PRN
Status: DISCONTINUED | OUTPATIENT
Start: 2022-08-25 | End: 2022-08-25 | Stop reason: HOSPADM

## 2022-08-25 RX ORDER — MIDAZOLAM HYDROCHLORIDE 1 MG/ML
INJECTION, SOLUTION INTRAMUSCULAR; INTRAVENOUS
Status: DISCONTINUED | OUTPATIENT
Start: 2022-08-25 | End: 2022-08-25

## 2022-08-25 RX ORDER — ACETAMINOPHEN 10 MG/ML
INJECTION, SOLUTION INTRAVENOUS
Status: DISCONTINUED | OUTPATIENT
Start: 2022-08-25 | End: 2022-08-25

## 2022-08-25 RX ORDER — FENTANYL CITRATE 50 UG/ML
INJECTION, SOLUTION INTRAMUSCULAR; INTRAVENOUS
Status: DISCONTINUED | OUTPATIENT
Start: 2022-08-25 | End: 2022-08-25

## 2022-08-25 RX ORDER — LIDOCAINE HYDROCHLORIDE 20 MG/ML
INJECTION INTRAVENOUS
Status: DISCONTINUED | OUTPATIENT
Start: 2022-08-25 | End: 2022-08-25

## 2022-08-25 RX ORDER — OXYCODONE HYDROCHLORIDE 5 MG/1
5 TABLET ORAL
Status: DISCONTINUED | OUTPATIENT
Start: 2022-08-25 | End: 2022-08-25 | Stop reason: HOSPADM

## 2022-08-25 RX ORDER — BUPIVACAINE HYDROCHLORIDE 2.5 MG/ML
INJECTION, SOLUTION EPIDURAL; INFILTRATION; INTRACAUDAL
Status: DISCONTINUED | OUTPATIENT
Start: 2022-08-25 | End: 2022-08-25 | Stop reason: HOSPADM

## 2022-08-25 RX ORDER — SODIUM CHLORIDE, SODIUM LACTATE, POTASSIUM CHLORIDE, CALCIUM CHLORIDE 600; 310; 30; 20 MG/100ML; MG/100ML; MG/100ML; MG/100ML
INJECTION, SOLUTION INTRAVENOUS CONTINUOUS
Status: DISCONTINUED | OUTPATIENT
Start: 2022-08-25 | End: 2022-08-25 | Stop reason: HOSPADM

## 2022-08-25 RX ORDER — CEFAZOLIN SODIUM 2 G/50ML
2 SOLUTION INTRAVENOUS
Status: COMPLETED | OUTPATIENT
Start: 2022-08-25 | End: 2022-08-25

## 2022-08-25 RX ORDER — EPINEPHRINE 1 MG/ML
INJECTION INTRAMUSCULAR; INTRAVENOUS; SUBCUTANEOUS
Status: DISCONTINUED | OUTPATIENT
Start: 2022-08-25 | End: 2022-08-25 | Stop reason: HOSPADM

## 2022-08-25 RX ORDER — KETOROLAC TROMETHAMINE 30 MG/ML
INJECTION, SOLUTION INTRAMUSCULAR; INTRAVENOUS
Status: DISCONTINUED | OUTPATIENT
Start: 2022-08-25 | End: 2022-08-25

## 2022-08-25 RX ADMIN — ACETAMINOPHEN 1000 MG: 10 INJECTION, SOLUTION INTRAVENOUS at 09:08

## 2022-08-25 RX ADMIN — MIDAZOLAM HYDROCHLORIDE 2 MG: 1 INJECTION, SOLUTION INTRAMUSCULAR; INTRAVENOUS at 09:08

## 2022-08-25 RX ADMIN — MUPIROCIN: 20 OINTMENT TOPICAL at 08:08

## 2022-08-25 RX ADMIN — SODIUM CHLORIDE, POTASSIUM CHLORIDE, SODIUM LACTATE AND CALCIUM CHLORIDE: 600; 310; 30; 20 INJECTION, SOLUTION INTRAVENOUS at 08:08

## 2022-08-25 RX ADMIN — FENTANYL CITRATE 25 MCG: 50 INJECTION, SOLUTION INTRAMUSCULAR; INTRAVENOUS at 09:08

## 2022-08-25 RX ADMIN — ONDANSETRON 4 MG: 2 INJECTION, SOLUTION INTRAMUSCULAR; INTRAVENOUS at 09:08

## 2022-08-25 RX ADMIN — LIDOCAINE HYDROCHLORIDE 100 MG: 20 INJECTION, SOLUTION INTRAVENOUS at 09:08

## 2022-08-25 RX ADMIN — PROPOFOL 200 MG: 10 INJECTION, EMULSION INTRAVENOUS at 09:08

## 2022-08-25 RX ADMIN — KETOROLAC TROMETHAMINE 30 MG: 30 INJECTION, SOLUTION INTRAMUSCULAR at 09:08

## 2022-08-25 RX ADMIN — CEFAZOLIN SODIUM 2 G: 1 INJECTION, POWDER, FOR SOLUTION INTRAMUSCULAR; INTRAVENOUS at 09:08

## 2022-08-25 RX ADMIN — FENTANYL CITRATE 50 MCG: 50 INJECTION, SOLUTION INTRAMUSCULAR; INTRAVENOUS at 09:08

## 2022-08-25 NOTE — OP NOTE
[facility] - Surgery  Operative Note    SUMMARY     Date of Procedure: 8/25/2022     Pre-Operative Diagnosis: Primary localized osteoarthrosis of the knee, left [M17.12]  Derangement of posterior horn of medial meniscus due to old tear or injury, left knee [M23.222]    Post-Operative Diagnosis: Post-Op Diagnosis Codes:     * Primary localized osteoarthrosis of the knee, left [M17.12]     * Derangement of posterior horn of medial meniscus due to old tear or injury, left knee [M23.222]    Procedure: Procedure(s) (LRB):  ARTHROSCOPY, KNEE, WITH MENISCECTOMY (Left)       Surgeon(s) and Role:     * Azar Michelle MD - Primary    Indications for procedure:      Procedure in Detail:  After obtaining consent and starting the patient on preoperative IV antibiotics, patient was taken back to the operating room where general anesthesia was performed by the anesthesia team.  The left lower extremity was prepped and draped in normal sterile fashion.  After 10 minutes of elevation the pneumatic tourniquet was inflated to 300 mmHg.  Standard inferior lateral portal was established arthroscope introduced into the knee.  Patellofemoral compartment showed areas of grade 3 chondrosis went down the lateral gutter there were some loose bodies noted that were evacuated through the scope went down the medial compartment localize a medial portal spinal needle established a medial portal inspect the medial compartment showed areas of grade 3 and 4 chondrosis medial femoral condyle there was indeed a frayed degenerative tear of the posterior horn medial meniscus that we debrided down the shaver to smooth stable rim.  In the notch the ACL was intact.  Lateral compartment looked healthy rim with no meniscal or chondral pathology.  We did splints time irrigating since within the patellofemoral compartment we also performed a limited chondroplasty in the patellofemoral and medial compartments we evacuated fluid closed portal sites with nylon  stitches injected knee with course of Marcaine plain sterile dressing applied tourniquet deflated this concluded the operative procedure    Anesthesia: General    Complications: No    Estimated Blood Loss (EBL): * No values recorded between 8/25/2022  9:39 AM and 8/25/2022 10:02 AM *           Implants: * No implants in log *    Specimens:   Specimen (24h ago, onward)            None

## 2022-08-25 NOTE — DISCHARGE INSTRUCTIONS
KNEE ARTHROSCOPY    DO:   Keep leg elevated while at rest until return appointment.   Use crutches at all times, unless otherwise directed by your physician.   Apply ice to knee next 2 days for 20 min intervals.  Remove ace wrap 2 days after surgery. You may shower, pat dry, and place band aids over the operative site. Re-wrap your knee with the ace bandage.   Check circulation frequently in toes by pressing down on nail. Nail should turn white then pink. Your extremity should not be bluish gray.     DO NOT:   Do not soak in a tub or pool.   Do not take additional tylenol/acetaminophen while taking narcotic pain medication that contains tylenol/acetaminophen.     CALL PHYSICIAN FOR:   Bleeding or signs of infection (redness, swelling, draining pus or warm to touch).   Fever greater than 101.   Persistent pain not relieved by pain medication.    RETURN APPOINTMENT AS INSTRUCTED  CALL [phone] for doctor.

## 2022-08-25 NOTE — ANESTHESIA PREPROCEDURE EVALUATION
08/25/2022  Samir Muñoz is a 61 y.o., male.      Pre-op Assessment    I have reviewed the Patient Summary Reports.     I have reviewed the Nursing Notes. I have reviewed the NPO Status.   I have reviewed the Medications.     Review of Systems  Anesthesia Hx:  No problems with previous Anesthesia    Social:  Non-Smoker    Cardiovascular:   hyperlipidemia    Pulmonary:   Sleep Apnea    Education provided regarding risk of obstructive sleep apnea     Renal/:   BPH    Hepatic/GI:   GERD, well controlled    Neurological:  Neurology Normal    Endocrine:  Endocrine Normal  Obesity / BMI > 30      Physical Exam  General: Well nourished, Cooperative, Alert and Oriented    Airway:  Mallampati: I   Mouth Opening: Normal  Neck ROM: Normal ROM        Anesthesia Plan  Type of Anesthesia, risks & benefits discussed:    Anesthesia Type: Gen ETT, Gen Supraglottic Airway, Gen Natural Airway, MAC  Intra-op Monitoring Plan: Standard ASA Monitors  Post Op Pain Control Plan: multimodal analgesia  Induction:  IV  Airway Plan: Direct, Video and Fiberoptic, Post-Induction  Informed Consent: Informed consent signed with the Patient and all parties understand the risks and agree with anesthesia plan.  All questions answered.   ASA Score: 2    Ready For Surgery From Anesthesia Perspective.     .

## 2022-08-25 NOTE — DISCHARGE SUMMARY
Discharge Note  Short Stay      SUMMARY     Admit Date: 8/25/2022    Attending Physician: Azar Michelle MD     Discharge Physician: Azar Michelle MD    Discharge Date: 8/25/2022 10:06 AM    Final Diagnosis: Primary localized osteoarthrosis of the knee, left [M17.12]  Derangement of posterior horn of medial meniscus due to old tear or injury, left knee [M23.222]    Hospital Course: Patient tolerated procedure well.     Disposition: Home or Self Care    Patient Instructions:   Current Discharge Medication List      CONTINUE these medications which have NOT CHANGED    Details   cetirizine (ZYRTEC) 10 MG tablet Take 10 mg by mouth once daily.      montelukast (SINGULAIR) 10 mg tablet Take 10 mg by mouth once daily.      multivitamin (THERAGRAN) per tablet Take 1 tablet by mouth once daily.      oxyCODONE-acetaminophen (PERCOCET) 5-325 mg per tablet Take 1 tablet by mouth every 4 to 6 hours as needed for Pain.  Qty: 42 tablet, Refills: 0    Comments: Quantity prescribed more than 7 day supply? Yes, quantity medically necessary  Associated Diagnoses: Degenerative tear of left medial meniscus; Left knee pain; Derangement of posterior horn of medial meniscus due to old tear or injury, left knee             Discharge Procedure Orders (must include Diet, Follow-up, Activity):   Discharge Procedure Orders (must include Diet, Follow-up, Activity)   Keep surgical extremity elevated     Ice to affected area     Remove dressing in 48 hours     Activity as tolerated   Order Comments: Crutches for comfort        Follow Up:  Follow up as scheduled.  Resume routine diet.  Activity as tolerated.    No driving day of procedure.

## 2022-08-25 NOTE — ANESTHESIA POSTPROCEDURE EVALUATION
Anesthesia Post Evaluation    Patient: Samir Muñoz    Procedure(s) Performed: Procedure(s) (LRB):  ARTHROSCOPY, KNEE, WITH MENISCECTOMY (Left)    Final Anesthesia Type: general      Patient location during evaluation: PACU  Patient participation: Yes- Able to Participate  Level of consciousness: awake and alert and oriented  Post-procedure vital signs: reviewed and stable  Pain management: adequate  Airway patency: patent    PONV status at discharge: No PONV  Anesthetic complications: no      Cardiovascular status: blood pressure returned to baseline and stable  Respiratory status: unassisted and spontaneous ventilation  Hydration status: euvolemic  Follow-up not needed.          Vitals Value Taken Time   /58 08/25/22 1002   Temp   08/25/22 1007   Pulse 66 08/25/22 1002   Resp 10 08/25/22 1002   SpO2 98 % 08/25/22 1002         No case tracking events are documented in the log.      Pain/Dakota Score: Dakota Score: 5 (8/25/2022 10:02 AM)

## 2022-08-25 NOTE — TRANSFER OF CARE
"Anesthesia Transfer of Care Note    Patient: Samir Muñoz    Procedure(s) Performed: Procedure(s) (LRB):  ARTHROSCOPY, KNEE, WITH MENISCECTOMY (Left)    Patient location: PACU    Anesthesia Type: general    Transport from OR: Transported from OR on room air with adequate spontaneous ventilation    Post pain: adequate analgesia    Post assessment: no apparent anesthetic complications and tolerated procedure well    Post vital signs: stable    Level of consciousness: awake and alert    Nausea/Vomiting: no nausea/vomiting    Complications: none    Transfer of care protocol was followed      Last vitals:   Visit Vitals  BP (!) 113/58 (BP Location: Left arm, Patient Position: Lying)   Pulse 66   Temp 36.6 °C (97.9 °F) (Temporal)   Resp 10   Ht 6' 2" (1.88 m)   Wt 113.4 kg (250 lb)   SpO2 98%   BMI 32.10 kg/m²     "

## 2022-08-26 ENCOUNTER — PATIENT MESSAGE (OUTPATIENT)
Dept: ORTHOPEDICS | Facility: CLINIC | Age: 62
End: 2022-08-26
Payer: OTHER GOVERNMENT

## 2022-08-26 VITALS
SYSTOLIC BLOOD PRESSURE: 125 MMHG | RESPIRATION RATE: 16 BRPM | HEART RATE: 69 BPM | HEIGHT: 74 IN | DIASTOLIC BLOOD PRESSURE: 70 MMHG | TEMPERATURE: 98 F | BODY MASS INDEX: 32.08 KG/M2 | WEIGHT: 250 LBS | OXYGEN SATURATION: 98 %

## 2022-09-01 ENCOUNTER — PATIENT MESSAGE (OUTPATIENT)
Dept: ORTHOPEDICS | Facility: CLINIC | Age: 62
End: 2022-09-01
Payer: OTHER GOVERNMENT

## 2022-09-08 ENCOUNTER — OFFICE VISIT (OUTPATIENT)
Dept: ORTHOPEDICS | Facility: CLINIC | Age: 62
End: 2022-09-08
Payer: OTHER GOVERNMENT

## 2022-09-08 VITALS — BODY MASS INDEX: 32.08 KG/M2 | HEIGHT: 74 IN | WEIGHT: 250 LBS

## 2022-09-08 DIAGNOSIS — M25.562 LEFT KNEE PAIN: Primary | ICD-10-CM

## 2022-09-08 DIAGNOSIS — M23.222 DERANGEMENT OF POSTERIOR HORN OF MEDIAL MENISCUS DUE TO OLD TEAR OR INJURY, LEFT KNEE: ICD-10-CM

## 2022-09-08 PROCEDURE — 99213 OFFICE O/P EST LOW 20 MIN: CPT | Mod: PBBFAC,PN | Performed by: ORTHOPAEDIC SURGERY

## 2022-09-08 PROCEDURE — 99999 PR PBB SHADOW E&M-EST. PATIENT-LVL III: ICD-10-PCS | Mod: PBBFAC,,, | Performed by: ORTHOPAEDIC SURGERY

## 2022-09-08 PROCEDURE — 99999 PR PBB SHADOW E&M-EST. PATIENT-LVL III: CPT | Mod: PBBFAC,,, | Performed by: ORTHOPAEDIC SURGERY

## 2022-09-08 PROCEDURE — 99024 PR POST-OP FOLLOW-UP VISIT: ICD-10-PCS | Mod: ,,, | Performed by: ORTHOPAEDIC SURGERY

## 2022-09-08 PROCEDURE — 99024 POSTOP FOLLOW-UP VISIT: CPT | Mod: ,,, | Performed by: ORTHOPAEDIC SURGERY

## 2022-09-08 NOTE — PROGRESS NOTES
2 weeks post arthroscopy.  Doing well - no complaints.  Wounds look good without signs of infection.  Sutures removed.  Patient was instructed to gradually return to activities to tolerance and encouraged to work to progressively strengthen the extremity over time.  Follow up as needed.

## 2022-09-21 ENCOUNTER — OFFICE VISIT (OUTPATIENT)
Dept: FAMILY MEDICINE | Facility: CLINIC | Age: 62
End: 2022-09-21
Payer: OTHER GOVERNMENT

## 2022-09-21 DIAGNOSIS — R07.89 OTHER CHEST PAIN: Primary | ICD-10-CM

## 2022-09-21 PROCEDURE — 99213 PR OFFICE/OUTPT VISIT, EST, LEVL III, 20-29 MIN: ICD-10-PCS | Mod: 95,,, | Performed by: FAMILY MEDICINE

## 2022-09-21 PROCEDURE — 99213 OFFICE O/P EST LOW 20 MIN: CPT | Mod: 95,,, | Performed by: FAMILY MEDICINE

## 2022-09-22 ENCOUNTER — OFFICE VISIT (OUTPATIENT)
Dept: FAMILY MEDICINE | Facility: CLINIC | Age: 62
End: 2022-09-22
Payer: OTHER GOVERNMENT

## 2022-09-22 VITALS
SYSTOLIC BLOOD PRESSURE: 116 MMHG | BODY MASS INDEX: 33.25 KG/M2 | OXYGEN SATURATION: 98 % | WEIGHT: 259.06 LBS | HEART RATE: 79 BPM | HEIGHT: 74 IN | DIASTOLIC BLOOD PRESSURE: 74 MMHG

## 2022-09-22 DIAGNOSIS — R07.9 CHEST PAIN, UNSPECIFIED TYPE: Primary | ICD-10-CM

## 2022-09-22 DIAGNOSIS — E78.5 HYPERLIPIDEMIA, UNSPECIFIED HYPERLIPIDEMIA TYPE: Primary | ICD-10-CM

## 2022-09-22 PROCEDURE — 99999 PR PBB SHADOW E&M-EST. PATIENT-LVL III: CPT | Mod: PBBFAC,,, | Performed by: FAMILY MEDICINE

## 2022-09-22 PROCEDURE — 93010 EKG 12-LEAD: ICD-10-PCS | Mod: S$PBB,,, | Performed by: INTERNAL MEDICINE

## 2022-09-22 PROCEDURE — 93005 ELECTROCARDIOGRAM TRACING: CPT | Mod: PBBFAC,PO | Performed by: INTERNAL MEDICINE

## 2022-09-22 PROCEDURE — 99214 OFFICE O/P EST MOD 30 MIN: CPT | Mod: S$PBB,,, | Performed by: FAMILY MEDICINE

## 2022-09-22 PROCEDURE — 99213 OFFICE O/P EST LOW 20 MIN: CPT | Mod: PBBFAC,PO | Performed by: FAMILY MEDICINE

## 2022-09-22 PROCEDURE — 99214 PR OFFICE/OUTPT VISIT, EST, LEVL IV, 30-39 MIN: ICD-10-PCS | Mod: S$PBB,,, | Performed by: FAMILY MEDICINE

## 2022-09-22 PROCEDURE — 99999 PR PBB SHADOW E&M-EST. PATIENT-LVL III: ICD-10-PCS | Mod: PBBFAC,,, | Performed by: FAMILY MEDICINE

## 2022-09-22 PROCEDURE — 93010 ELECTROCARDIOGRAM REPORT: CPT | Mod: S$PBB,,, | Performed by: INTERNAL MEDICINE

## 2022-09-22 NOTE — PROGRESS NOTES
Subjective:       Patient ID: Samir Muñoz is a 61 y.o. male.    Chief Complaint: Follow-up (Dull pain between chest and shoulder.  Patient was lifting weights left side)    Here for acute visit for left chest pain. Happened after lifting weights and persistent still.  Some left shoulder pain as well.  It was incline press machine.  Seems improved.      Follow-up  Associated symptoms include arthralgias. Pertinent negatives include no chest pain, chills, coughing or fever.   Review of Systems   Constitutional:  Negative for chills and fever.   Respiratory:  Negative for cough, chest tightness and shortness of breath.    Cardiovascular:  Negative for chest pain, palpitations and leg swelling.   Endocrine: Negative for cold intolerance and heat intolerance.   Musculoskeletal:  Positive for arthralgias.   Psychiatric/Behavioral:  Negative for decreased concentration. The patient is not nervous/anxious.      Objective:      Physical Exam  Vitals and nursing note reviewed.   Constitutional:       Appearance: Normal appearance. He is well-developed.   HENT:      Head: Normocephalic and atraumatic.   Cardiovascular:      Rate and Rhythm: Normal rate and regular rhythm.      Heart sounds: Normal heart sounds.   Pulmonary:      Effort: Pulmonary effort is normal.      Breath sounds: Normal breath sounds.   Musculoskeletal:      Comments: Left shoulder exam normal; reproducible pain left pec minor insertion area   Neurological:      Mental Status: He is alert.   Psychiatric:         Mood and Affect: Mood normal.         Behavior: Behavior normal.       Assessment:       1. Chest pain, unspecified type        Plan:       Chest pain, unspecified type  -     EKG 12-lead    Ekg reviewed after initial exam and normal  Prn nsaid and monitor      Follow up if symptoms worsen or fail to improve.

## 2022-10-16 PROBLEM — R07.89 OTHER CHEST PAIN: Status: ACTIVE | Noted: 2022-10-16

## 2022-10-16 NOTE — PROGRESS NOTES
Assessment:       1. Other chest pain          Plan:       Other chest pain:  New problem workup needed       The patient was recommended to have an appointment to get this check in the office, if the symptoms are severe, the patient was recommended to go to the ER.  Recommend the patient to drink more water, can take ibuprofen 400 mg every 8 hours.   Patient agreed with assessment and plan. Patient verbalized understanding.     Subjective:       Patient ID: Samir Muñoz is a 62 y.o. male.    Chief Complaint: Chest Pain    HPI    The patient location is:  [place]  The chief complaint leading to consultation is:  Chest pain    Visit type: audiovisual    Face to Face time with patient:  10 minutes  Twelve minutes of total time spent on the encounter, which includes face to face time and non-face to face time preparing to see the patient (eg, review of tests), Obtaining and/or reviewing separately obtained history, Documenting clinical information in the electronic or other health record, Independently interpreting results (not separately reported) and communicating results to the patient/family/caregiver, or Care coordination (not separately reported).         Each patient to whom he or she provides medical services by telemedicine is:  (1) informed of the relationship between the physician and patient and the respective role of any other health care provider with respect to management of the patient; and (2) notified that he or she may decline to receive medical services by telemedicine and may withdraw from such care at any time.    Notes:  Patient stated that when he was working out notice discomfort on the chest area like a pulled muscle, the symptoms persist the next day, the patient stated the symptoms are worse, the patient current sleep last night due to the pain. He has been taking over-the-counter anti-inflammatories with mild relief of the symptoms.    Past medical history, past social history was  reviewed and discussed with the patient.    Review of Systems   Constitutional:  Negative for activity change, appetite change and chills.   HENT:  Negative for congestion, ear discharge, hearing loss, rhinorrhea and trouble swallowing.    Eyes:  Negative for discharge and itching.   Respiratory:  Positive for chest tightness. Negative for choking and wheezing.    Cardiovascular:  Negative for palpitations and leg swelling.   Gastrointestinal:  Negative for abdominal distention, abdominal pain and constipation.   Endocrine: Negative for cold intolerance and heat intolerance.   Genitourinary:  Negative for dysuria and flank pain.   Musculoskeletal:  Negative for arthralgias and back pain.   Skin:  Negative for pallor and rash.   Allergic/Immunologic: Negative for environmental allergies and food allergies.   Neurological:  Positive for headaches. Negative for dizziness and facial asymmetry.   Hematological:  Negative for adenopathy. Does not bruise/bleed easily.   Psychiatric/Behavioral:  Negative for agitation, confusion, decreased concentration and sleep disturbance.      Objective:      Physical Exam  Constitutional:       General: He is not in acute distress.     Appearance: Normal appearance.   HENT:      Head: Normocephalic and atraumatic.   Neurological:      Mental Status: He is alert.   Psychiatric:         Mood and Affect: Mood normal.         Thought Content: Thought content normal.         Judgment: Judgment normal.

## 2022-10-28 ENCOUNTER — LAB VISIT (OUTPATIENT)
Dept: LAB | Facility: HOSPITAL | Age: 62
End: 2022-10-28
Attending: FAMILY MEDICINE
Payer: OTHER GOVERNMENT

## 2022-10-28 DIAGNOSIS — E78.5 HYPERLIPIDEMIA, UNSPECIFIED HYPERLIPIDEMIA TYPE: ICD-10-CM

## 2022-10-28 LAB
ALBUMIN SERPL BCP-MCNC: 4.2 G/DL (ref 3.5–5.2)
ALP SERPL-CCNC: 79 U/L (ref 55–135)
ALT SERPL W/O P-5'-P-CCNC: 42 U/L (ref 10–44)
ANION GAP SERPL CALC-SCNC: 13 MMOL/L (ref 8–16)
AST SERPL-CCNC: 32 U/L (ref 10–40)
BILIRUB SERPL-MCNC: 0.7 MG/DL (ref 0.1–1)
BUN SERPL-MCNC: 14 MG/DL (ref 6–20)
CALCIUM SERPL-MCNC: 9.2 MG/DL (ref 8.7–10.5)
CHLORIDE SERPL-SCNC: 104 MMOL/L (ref 95–110)
CHOLEST SERPL-MCNC: 258 MG/DL (ref 120–199)
CHOLEST/HDLC SERPL: 4.9 {RATIO} (ref 2–5)
CO2 SERPL-SCNC: 23 MMOL/L (ref 23–29)
CREAT SERPL-MCNC: 1.1 MG/DL (ref 0.5–1.4)
EST. GFR  (NO RACE VARIABLE): >60 ML/MIN/1.73 M^2
GLUCOSE SERPL-MCNC: 90 MG/DL (ref 70–110)
HDLC SERPL-MCNC: 53 MG/DL (ref 40–75)
HDLC SERPL: 20.5 % (ref 20–50)
LDLC SERPL CALC-MCNC: 174.4 MG/DL (ref 63–159)
NONHDLC SERPL-MCNC: 205 MG/DL
POTASSIUM SERPL-SCNC: 4.4 MMOL/L (ref 3.5–5.1)
PROT SERPL-MCNC: 7.5 G/DL (ref 6–8.4)
SODIUM SERPL-SCNC: 140 MMOL/L (ref 136–145)
TRIGL SERPL-MCNC: 153 MG/DL (ref 30–150)
TSH SERPL DL<=0.005 MIU/L-ACNC: 1.46 UIU/ML (ref 0.4–4)

## 2022-10-28 PROCEDURE — 36415 COLL VENOUS BLD VENIPUNCTURE: CPT | Mod: PN | Performed by: FAMILY MEDICINE

## 2022-10-28 PROCEDURE — 80061 LIPID PANEL: CPT | Performed by: FAMILY MEDICINE

## 2022-10-28 PROCEDURE — 84443 ASSAY THYROID STIM HORMONE: CPT | Performed by: FAMILY MEDICINE

## 2022-10-28 PROCEDURE — 80053 COMPREHEN METABOLIC PANEL: CPT | Performed by: FAMILY MEDICINE

## 2022-10-31 ENCOUNTER — PATIENT MESSAGE (OUTPATIENT)
Dept: FAMILY MEDICINE | Facility: CLINIC | Age: 62
End: 2022-10-31
Payer: OTHER GOVERNMENT

## 2022-11-01 ENCOUNTER — OFFICE VISIT (OUTPATIENT)
Dept: FAMILY MEDICINE | Facility: CLINIC | Age: 62
End: 2022-11-01
Payer: OTHER GOVERNMENT

## 2022-11-01 VITALS
BODY MASS INDEX: 34.01 KG/M2 | SYSTOLIC BLOOD PRESSURE: 118 MMHG | DIASTOLIC BLOOD PRESSURE: 86 MMHG | HEART RATE: 79 BPM | WEIGHT: 265 LBS | OXYGEN SATURATION: 96 % | HEIGHT: 74 IN

## 2022-11-01 DIAGNOSIS — E78.5 HYPERLIPIDEMIA, UNSPECIFIED HYPERLIPIDEMIA TYPE: Primary | ICD-10-CM

## 2022-11-01 DIAGNOSIS — Z00.00 WELLNESS EXAMINATION: ICD-10-CM

## 2022-11-01 DIAGNOSIS — K21.9 GASTROESOPHAGEAL REFLUX DISEASE, UNSPECIFIED WHETHER ESOPHAGITIS PRESENT: ICD-10-CM

## 2022-11-01 PROBLEM — R07.89 OTHER CHEST PAIN: Status: RESOLVED | Noted: 2022-10-16 | Resolved: 2022-11-01

## 2022-11-01 PROCEDURE — 99214 OFFICE O/P EST MOD 30 MIN: CPT | Mod: S$PBB,,, | Performed by: FAMILY MEDICINE

## 2022-11-01 PROCEDURE — 99214 PR OFFICE/OUTPT VISIT, EST, LEVL IV, 30-39 MIN: ICD-10-PCS | Mod: S$PBB,,, | Performed by: FAMILY MEDICINE

## 2022-11-01 PROCEDURE — 99213 OFFICE O/P EST LOW 20 MIN: CPT | Mod: PBBFAC,PO | Performed by: FAMILY MEDICINE

## 2022-11-01 PROCEDURE — 99999 PR PBB SHADOW E&M-EST. PATIENT-LVL III: ICD-10-PCS | Mod: PBBFAC,,, | Performed by: FAMILY MEDICINE

## 2022-11-01 PROCEDURE — 99999 PR PBB SHADOW E&M-EST. PATIENT-LVL III: CPT | Mod: PBBFAC,,, | Performed by: FAMILY MEDICINE

## 2022-11-01 NOTE — PROGRESS NOTES
Subjective:       Patient ID: Samir Muñoz is a 62 y.o. male.    Chief Complaint: Follow-up (6 month follow up- go over lab results)    Here for f/u lipids and chronic issues. On more of a keto diet recently. Had labs a few days ago.      Follow-up  Pertinent negatives include no chest pain, chills, coughing or fever.   Review of Systems   Constitutional:  Negative for chills and fever.   Respiratory:  Negative for cough, chest tightness and shortness of breath.    Cardiovascular:  Negative for chest pain, palpitations and leg swelling.   Endocrine: Negative for cold intolerance and heat intolerance.   Psychiatric/Behavioral:  Negative for decreased concentration. The patient is not nervous/anxious.      Objective:      Physical Exam  Vitals and nursing note reviewed.   Constitutional:       Appearance: He is well-developed.   HENT:      Head: Normocephalic and atraumatic.   Cardiovascular:      Rate and Rhythm: Normal rate and regular rhythm.      Heart sounds: Normal heart sounds.   Pulmonary:      Effort: Pulmonary effort is normal.      Breath sounds: Normal breath sounds.       Assessment:       1. Hyperlipidemia, unspecified hyperlipidemia type    2. Gastroesophageal reflux disease, unspecified whether esophagitis present    3. Wellness examination          Plan:       Hyperlipidemia, unspecified hyperlipidemia type    Gastroesophageal reflux disease, unspecified whether esophagitis present    Wellness examination  -     CBC Without Differential; Future; Expected date: 11/01/2022  -     Comprehensive Metabolic Panel; Future; Expected date: 11/01/2022  -     Lipid Panel; Future; Expected date: 11/01/2022  -     PSA, Screening; Future; Expected date: 11/01/2022  -     TSH; Future; Expected date: 11/01/2022  -     Hemoglobin A1C; Future; Expected date: 11/01/2022      Previously did not tolerate statin  Elects to try diet/exercise and recheck in 6 months  Labs before f/u  Will monitor chronic medical  issues and continue current p    Follow up in about 6 months (around 5/1/2023), or if symptoms worsen or fail to improve.

## 2022-11-02 ENCOUNTER — OFFICE VISIT (OUTPATIENT)
Dept: DERMATOLOGY | Facility: CLINIC | Age: 62
End: 2022-11-02
Payer: OTHER GOVERNMENT

## 2022-11-02 DIAGNOSIS — D18.01 CHERRY ANGIOMA: ICD-10-CM

## 2022-11-02 DIAGNOSIS — D22.9 BENIGN NEVUS: ICD-10-CM

## 2022-11-02 DIAGNOSIS — L71.9 ROSACEA: ICD-10-CM

## 2022-11-02 DIAGNOSIS — L57.8 ACTINIC SKIN DAMAGE: ICD-10-CM

## 2022-11-02 DIAGNOSIS — L82.1 SEBORRHEIC KERATOSES: Primary | ICD-10-CM

## 2022-11-02 DIAGNOSIS — L57.0 ACTINIC KERATOSIS: ICD-10-CM

## 2022-11-02 PROCEDURE — 99204 OFFICE O/P NEW MOD 45 MIN: CPT | Mod: 25,S$PBB,, | Performed by: STUDENT IN AN ORGANIZED HEALTH CARE EDUCATION/TRAINING PROGRAM

## 2022-11-02 PROCEDURE — 17000 DESTRUCT PREMALG LESION: CPT | Mod: S$PBB,,, | Performed by: STUDENT IN AN ORGANIZED HEALTH CARE EDUCATION/TRAINING PROGRAM

## 2022-11-02 PROCEDURE — 17003 DESTRUCT PREMALG LES 2-14: CPT | Mod: PBBFAC,PO | Performed by: STUDENT IN AN ORGANIZED HEALTH CARE EDUCATION/TRAINING PROGRAM

## 2022-11-02 PROCEDURE — 99204 PR OFFICE/OUTPT VISIT, NEW, LEVL IV, 45-59 MIN: ICD-10-PCS | Mod: 25,S$PBB,, | Performed by: STUDENT IN AN ORGANIZED HEALTH CARE EDUCATION/TRAINING PROGRAM

## 2022-11-02 PROCEDURE — 17003 DESTRUCTION, PREMALIGNANT LESIONS; SECOND THROUGH 14 LESIONS: ICD-10-PCS | Mod: S$PBB,,, | Performed by: STUDENT IN AN ORGANIZED HEALTH CARE EDUCATION/TRAINING PROGRAM

## 2022-11-02 PROCEDURE — 99213 OFFICE O/P EST LOW 20 MIN: CPT | Mod: PBBFAC,PO | Performed by: STUDENT IN AN ORGANIZED HEALTH CARE EDUCATION/TRAINING PROGRAM

## 2022-11-02 PROCEDURE — 17000 PR DESTRUCTION(LASER SURGERY,CRYOSURGERY,CHEMOSURGERY),PREMALIGNANT LESIONS,FIRST LESION: ICD-10-PCS | Mod: S$PBB,,, | Performed by: STUDENT IN AN ORGANIZED HEALTH CARE EDUCATION/TRAINING PROGRAM

## 2022-11-02 PROCEDURE — 99999 PR PBB SHADOW E&M-EST. PATIENT-LVL III: CPT | Mod: PBBFAC,,, | Performed by: STUDENT IN AN ORGANIZED HEALTH CARE EDUCATION/TRAINING PROGRAM

## 2022-11-02 PROCEDURE — 99999 PR PBB SHADOW E&M-EST. PATIENT-LVL III: ICD-10-PCS | Mod: PBBFAC,,, | Performed by: STUDENT IN AN ORGANIZED HEALTH CARE EDUCATION/TRAINING PROGRAM

## 2022-11-02 PROCEDURE — 17003 DESTRUCT PREMALG LES 2-14: CPT | Mod: S$PBB,,, | Performed by: STUDENT IN AN ORGANIZED HEALTH CARE EDUCATION/TRAINING PROGRAM

## 2022-11-02 PROCEDURE — 17000 DESTRUCT PREMALG LESION: CPT | Mod: PBBFAC,PO | Performed by: STUDENT IN AN ORGANIZED HEALTH CARE EDUCATION/TRAINING PROGRAM

## 2022-11-02 NOTE — PATIENT INSTRUCTIONS

## 2022-11-02 NOTE — PROGRESS NOTES
Subjective:       Patient ID:  Samir Muñoz is a 62 y.o. male who presents for   Chief Complaint   Patient presents with    Spot     Right and left cheek     LOV 10/31/19 [place]    Patient here today for spots on right and left cheek x 2 years. Patient states it gets dry and will bleed. Puts Neosporin on it. It helps but never resolves.     C/o acne on forehead x years. Uses Cetaphil wash, has not cleared.    Denies Phx of NMSC  Fhx: maternal grandfather had MM    Review of Systems   Constitutional:  Negative for fever, chills and fatigue.   Respiratory:  Negative for cough and shortness of breath.    Gastrointestinal:  Negative for nausea and vomiting.   Skin:  Positive for wears hat. Negative for daily sunscreen use and activity-related sunscreen use.   Hematologic/Lymphatic: Bruises/bleeds easily.      Objective:    Physical Exam   Constitutional: He appears well-developed and well-nourished. No distress.   Neurological: He is alert and oriented to person, place, and time. He is not disoriented.   Psychiatric: He has a normal mood and affect.   Skin:   Areas Examined (abnormalities noted in diagram):   Scalp / Hair Palpated and Inspected  Head / Face Inspection Performed  Neck Inspection Performed  Chest / Axilla Inspection Performed  Abdomen Inspection Performed  Back Inspection Performed  RUE Inspected  LUE Inspection Performed  Nails and Digits Inspection Performed                     Diagram Legend     Erythematous scaling macule/papule c/w actinic keratosis       Vascular papule c/w angioma      Pigmented verrucoid papule/plaque c/w seborrheic keratosis      Yellow umbilicated papule c/w sebaceous hyperplasia      Irregularly shaped tan macule c/w lentigo     1-2 mm smooth white papules consistent with Milia      Movable subcutaneous cyst with punctum c/w epidermal inclusion cyst      Subcutaneous movable cyst c/w pilar cyst      Firm pink to brown papule c/w dermatofibroma      Pedunculated fleshy  papule(s) c/w skin tag(s)      Evenly pigmented macule c/w junctional nevus     Mildly variegated pigmented, slightly irregular-bordered macule c/w mildly atypical nevus      Flesh colored to evenly pigmented papule c/w intradermal nevus       Pink pearly papule/plaque c/w basal cell carcinoma      Erythematous hyperkeratotic cursted plaque c/w SCC      Surgical scar with no sign of skin cancer recurrence      Open and closed comedones      Inflammatory papules and pustules      Verrucoid papule consistent consistent with wart     Erythematous eczematous patches and plaques     Dystrophic onycholytic nail with subungual debris c/w onychomycosis     Umbilicated papule    Erythematous-base heme-crusted tan verrucoid plaque consistent with inflamed seborrheic keratosis     Erythematous Silvery Scaling Plaque c/w Psoriasis     See annotation      Assessment / Plan:        Seborrheic keratoses  These are benign inherited growths without a malignant potential. Reassurance given to patient. No treatment is necessary.     Actinic keratosis  -     Ambulatory referral/consult to Dermatology  Cryosurgery Procedure Note    Verbal consent from the patient is obtained and the patient is aware of the precancerous quality and need for treatment of these lesions. Liquid nitrogen cryosurgery is applied to the 3 actinic keratoses, as detailed in the physical exam, to produce a freeze injury. The patient is aware that blisters may form and is instructed on wound care with gentle cleansing and use of vaseline ointment to keep moist until healed. The patient is supplied a handout on cryosurgery and is instructed to call if lesions do not completely resolve.  If cheek not fully resolved in 6 weeks he should rtc    Benign nevus  Careful dermoscopy evaluation of nevi performed with none identified as needing biopsy today  Monitor for new mole or moles that are becoming bigger, darker, irritated, or developing irregular borders.     Cherry  angioma  This is a benign vascular lesion. Reassurance given. No treatment required.     Rosacea  - pinpoint pustules scattered over forehead, nose, lateral face w/ background erythema  - discussed sulfacetamide wash + metro vs. Triple cream  - sent Rx for triple cream to [facility] - use on face qday    Actinic skin damage  Upper body skin examination performed today including at least 9 points as noted in physical examination. Suspicious lesions noted.  Patient instructed in importance in daily broad spectrum sun protection of at least spf 30. Mineral sunscreen ingredients preferred (Zinc +/- Titanium) and can be found OTC.   Patient encouraged to wear hat for all outdoor exposure.   Also discussed sun avoidance and use of protective clothing.         1 year for recheck of face and scalp    No follow-ups on file.

## 2023-03-14 ENCOUNTER — PATIENT MESSAGE (OUTPATIENT)
Dept: ORTHOPEDICS | Facility: CLINIC | Age: 63
End: 2023-03-14
Payer: OTHER GOVERNMENT

## 2023-04-28 ENCOUNTER — LAB VISIT (OUTPATIENT)
Dept: LAB | Facility: HOSPITAL | Age: 63
End: 2023-04-28
Attending: FAMILY MEDICINE
Payer: OTHER GOVERNMENT

## 2023-04-28 DIAGNOSIS — Z00.00 WELLNESS EXAMINATION: ICD-10-CM

## 2023-04-28 LAB
ALBUMIN SERPL BCP-MCNC: 4.2 G/DL (ref 3.5–5.2)
ALP SERPL-CCNC: 80 U/L (ref 55–135)
ALT SERPL W/O P-5'-P-CCNC: 31 U/L (ref 10–44)
ANION GAP SERPL CALC-SCNC: 8 MMOL/L (ref 8–16)
AST SERPL-CCNC: 21 U/L (ref 10–40)
BILIRUB SERPL-MCNC: 0.5 MG/DL (ref 0.1–1)
BUN SERPL-MCNC: 15 MG/DL (ref 6–20)
CALCIUM SERPL-MCNC: 10 MG/DL (ref 8.7–10.5)
CHLORIDE SERPL-SCNC: 102 MMOL/L (ref 95–110)
CHOLEST SERPL-MCNC: 221 MG/DL (ref 120–199)
CHOLEST/HDLC SERPL: 4.3 {RATIO} (ref 2–5)
CO2 SERPL-SCNC: 29 MMOL/L (ref 23–29)
COMPLEXED PSA SERPL-MCNC: 0.36 NG/ML (ref 0–4)
CREAT SERPL-MCNC: 1.2 MG/DL (ref 0.5–1.4)
ERYTHROCYTE [DISTWIDTH] IN BLOOD BY AUTOMATED COUNT: 13.4 % (ref 11.5–14.5)
EST. GFR  (NO RACE VARIABLE): >60 ML/MIN/1.73 M^2
ESTIMATED AVG GLUCOSE: 94 MG/DL (ref 68–131)
GLUCOSE SERPL-MCNC: 86 MG/DL (ref 70–110)
HBA1C MFR BLD: 4.9 % (ref 4–5.6)
HCT VFR BLD AUTO: 48.3 % (ref 40–54)
HDLC SERPL-MCNC: 51 MG/DL (ref 40–75)
HDLC SERPL: 23.1 % (ref 20–50)
HGB BLD-MCNC: 16.3 G/DL (ref 14–18)
LDLC SERPL CALC-MCNC: 134 MG/DL (ref 63–159)
MCH RBC QN AUTO: 29.4 PG (ref 27–31)
MCHC RBC AUTO-ENTMCNC: 33.7 G/DL (ref 32–36)
MCV RBC AUTO: 87 FL (ref 82–98)
NONHDLC SERPL-MCNC: 170 MG/DL
PLATELET # BLD AUTO: 258 K/UL (ref 150–450)
PMV BLD AUTO: 10.9 FL (ref 9.2–12.9)
POTASSIUM SERPL-SCNC: 4.4 MMOL/L (ref 3.5–5.1)
PROT SERPL-MCNC: 7.5 G/DL (ref 6–8.4)
RBC # BLD AUTO: 5.55 M/UL (ref 4.6–6.2)
SODIUM SERPL-SCNC: 139 MMOL/L (ref 136–145)
TRIGL SERPL-MCNC: 180 MG/DL (ref 30–150)
TSH SERPL DL<=0.005 MIU/L-ACNC: 1.45 UIU/ML (ref 0.4–4)
WBC # BLD AUTO: 5.18 K/UL (ref 3.9–12.7)

## 2023-04-28 PROCEDURE — 85027 COMPLETE CBC AUTOMATED: CPT | Performed by: FAMILY MEDICINE

## 2023-04-28 PROCEDURE — 80053 COMPREHEN METABOLIC PANEL: CPT | Performed by: FAMILY MEDICINE

## 2023-04-28 PROCEDURE — 80061 LIPID PANEL: CPT | Performed by: FAMILY MEDICINE

## 2023-04-28 PROCEDURE — 84153 ASSAY OF PSA TOTAL: CPT | Performed by: FAMILY MEDICINE

## 2023-04-28 PROCEDURE — 36415 COLL VENOUS BLD VENIPUNCTURE: CPT | Mod: PN | Performed by: FAMILY MEDICINE

## 2023-04-28 PROCEDURE — 83036 HEMOGLOBIN GLYCOSYLATED A1C: CPT | Performed by: FAMILY MEDICINE

## 2023-04-28 PROCEDURE — 84443 ASSAY THYROID STIM HORMONE: CPT | Performed by: FAMILY MEDICINE

## 2023-05-25 ENCOUNTER — OFFICE VISIT (OUTPATIENT)
Dept: FAMILY MEDICINE | Facility: CLINIC | Age: 63
End: 2023-05-25
Payer: OTHER GOVERNMENT

## 2023-05-25 VITALS
HEIGHT: 72 IN | DIASTOLIC BLOOD PRESSURE: 68 MMHG | HEART RATE: 96 BPM | OXYGEN SATURATION: 95 % | BODY MASS INDEX: 33.56 KG/M2 | WEIGHT: 247.81 LBS | SYSTOLIC BLOOD PRESSURE: 110 MMHG

## 2023-05-25 DIAGNOSIS — Z00.00 WELLNESS EXAMINATION: Primary | ICD-10-CM

## 2023-05-25 DIAGNOSIS — E78.5 HYPERLIPIDEMIA, UNSPECIFIED HYPERLIPIDEMIA TYPE: ICD-10-CM

## 2023-05-25 DIAGNOSIS — J30.9 ALLERGIC RHINITIS, UNSPECIFIED SEASONALITY, UNSPECIFIED TRIGGER: ICD-10-CM

## 2023-05-25 PROCEDURE — 99999 PR PBB SHADOW E&M-EST. PATIENT-LVL III: CPT | Mod: PBBFAC,,, | Performed by: FAMILY MEDICINE

## 2023-05-25 PROCEDURE — 99213 OFFICE O/P EST LOW 20 MIN: CPT | Mod: PBBFAC,PO | Performed by: FAMILY MEDICINE

## 2023-05-25 PROCEDURE — 99396 PR PREVENTIVE VISIT,EST,40-64: ICD-10-PCS | Mod: S$PBB,,, | Performed by: FAMILY MEDICINE

## 2023-05-25 PROCEDURE — 99999 PR PBB SHADOW E&M-EST. PATIENT-LVL III: ICD-10-PCS | Mod: PBBFAC,,, | Performed by: FAMILY MEDICINE

## 2023-05-25 PROCEDURE — 99396 PREV VISIT EST AGE 40-64: CPT | Mod: S$PBB,,, | Performed by: FAMILY MEDICINE

## 2023-05-25 RX ORDER — METHYLPREDNISOLONE 4 MG/1
TABLET ORAL
Qty: 21 EACH | Refills: 0 | Status: SHIPPED | OUTPATIENT
Start: 2023-05-25 | End: 2023-06-15

## 2023-05-25 RX ORDER — FLUTICASONE FUROATE 27.5 UG/1
2 SPRAY, METERED NASAL DAILY
Qty: 6.6 ML | Refills: 2 | Status: SHIPPED | OUTPATIENT
Start: 2023-05-25

## 2023-05-25 NOTE — PROGRESS NOTES
Subjective:       Patient ID: Samir Muñoz is a 62 y.o. male.    Chief Complaint: Knee Pain (Left had surgery painful again with swelling)    Here for wellness and follow up multiple chronic medical issues. Doing well overall and in normal state of health.  Some left shoulder and knee pain lately.      Knee Pain     Review of Systems   Constitutional:  Negative for chills and fever.   Respiratory:  Negative for cough, chest tightness and shortness of breath.    Cardiovascular:  Negative for chest pain, palpitations and leg swelling.   Endocrine: Negative for cold intolerance and heat intolerance.   Musculoskeletal:  Positive for arthralgias.   Psychiatric/Behavioral:  Negative for decreased concentration. The patient is not nervous/anxious.      Objective:      Physical Exam  Vitals and nursing note reviewed.   Constitutional:       Appearance: He is well-developed.   HENT:      Head: Normocephalic and atraumatic.   Cardiovascular:      Rate and Rhythm: Normal rate and regular rhythm.      Heart sounds: Normal heart sounds.   Pulmonary:      Effort: Pulmonary effort is normal.      Breath sounds: Normal breath sounds.       Assessment:       1. Wellness examination    2. Hyperlipidemia, unspecified hyperlipidemia type    3. Allergic rhinitis, unspecified seasonality, unspecified trigger        Plan:       Wellness examination    Hyperlipidemia, unspecified hyperlipidemia type  -     Comprehensive Metabolic Panel; Future; Expected date: 05/25/2023  -     Lipid Panel; Future; Expected date: 05/25/2023    Allergic rhinitis, unspecified seasonality, unspecified trigger    Other orders  -     fluticasone (FLONASE SENSIMIST) 27.5 mcg/actuation nasal spray; 2 sprays by Nasal route once daily.  Dispense: 6.6 mL; Refill: 2  -     methylPREDNISolone (MEDROL DOSEPACK) 4 mg tablet; use as directed  Dispense: 21 each; Refill: 0      Update labs and health maintenance  [facility]  Will monitor chronic medical issues and  continue current plan of care.      Follow up in about 6 months (around 11/25/2023), or if symptoms worsen or fail to improve, for Virtual Visit.

## 2023-06-27 ENCOUNTER — PATIENT MESSAGE (OUTPATIENT)
Dept: FAMILY MEDICINE | Facility: CLINIC | Age: 63
End: 2023-06-27
Payer: OTHER GOVERNMENT

## 2023-06-27 ENCOUNTER — TELEPHONE (OUTPATIENT)
Dept: FAMILY MEDICINE | Facility: CLINIC | Age: 63
End: 2023-06-27
Payer: OTHER GOVERNMENT

## 2023-06-27 NOTE — TELEPHONE ENCOUNTER
----- Message from Saima Fournier sent at 6/27/2023  6:09 AM CDT -----  Regarding: [facility] Appointment Access Request  Appointment Request From: Samir Muñoz    With Provider: any provider but prefer Dr Black    Preferred Date Range: 6/27/2023 - 6/27/2023    Preferred Times: Any Time    Reason for visit: Symptoms of shingles,    Comments:  I would like to get in to obtain any treatment for shingles < I have had it before and am having symptoms

## 2023-06-27 NOTE — TELEPHONE ENCOUNTER
----- Message from Deidre Calle sent at 6/27/2023  9:06 AM CDT -----  Contact: [phone]  Type: Needs Medical Advice  Who Called:  Pt     Best Call Back Number: [phone]    Additional Information: Pt requesting to change appt to virtual if possible today. Pls call back and advise

## 2023-06-28 ENCOUNTER — OFFICE VISIT (OUTPATIENT)
Dept: FAMILY MEDICINE | Facility: CLINIC | Age: 63
End: 2023-06-28
Payer: OTHER GOVERNMENT

## 2023-06-28 DIAGNOSIS — B02.9 HERPES ZOSTER WITHOUT COMPLICATION: Primary | ICD-10-CM

## 2023-06-28 PROCEDURE — 99213 PR OFFICE/OUTPT VISIT, EST, LEVL III, 20-29 MIN: ICD-10-PCS | Mod: 95,,, | Performed by: NURSE PRACTITIONER

## 2023-06-28 PROCEDURE — 99213 OFFICE O/P EST LOW 20 MIN: CPT | Mod: 95,,, | Performed by: NURSE PRACTITIONER

## 2023-06-28 RX ORDER — VALACYCLOVIR HYDROCHLORIDE 1 G/1
1000 TABLET, FILM COATED ORAL 3 TIMES DAILY
Qty: 21 TABLET | Refills: 0 | Status: SHIPPED | OUTPATIENT
Start: 2023-06-28 | End: 2023-07-05

## 2023-06-28 NOTE — PROGRESS NOTES
Subjective     Patient ID: Samir Muñoz is a 62 y.o. male.    Chief Complaint: No chief complaint on file.    The patient location is: [place], [place]  The chief complaint leading to consultation is: shingles    Visit type: audiovisual    Face to Face time with patient: 12  15 minutes of total time spent on the encounter, which includes face to face time and non-face to face time preparing to see the patient (eg, review of tests), Obtaining and/or reviewing separately obtained history, Documenting clinical information in the electronic or other health record, Independently interpreting results (not separately reported) and communicating results to the patient/family/caregiver, or Care coordination (not separately reported).         Each patient to whom he or she provides medical services by telemedicine is:  (1) informed of the relationship between the physician and patient and the respective role of any other health care provider with respect to management of the patient; and (2) notified that he or she may decline to receive medical services by telemedicine and may withdraw from such care at any time.    Notes:        X 4-5 days, left side of forehead, having some throbbing radiating into eye. No redness in eye, no visual disturbance today. Had shingles 5 years ago in left forehead and eyes, has had constant post herpetic neuralgia in the area since then, but the symptoms have worsened in last several days.     Past Medical History:  No date: Cataract      Comment:  OD  No date: GERD (gastroesophageal reflux disease)  No date: Hyperlipidemia  No date: Hyperlipidemia  No date: Shingles      Comment:  involved eye  No date: Sleep apnea      Comment:  resolved with UPPP    Past Surgical History:  No date: ADENOIDECTOMY  10/25/2017: CATARACT EXTRACTION W/  INTRAOCULAR LENS IMPLANT; Left      Comment:  Dr Reza  2/11/2021: COLONOSCOPY; N/A      Comment:  Procedure: COLONOSCOPY;  Surgeon: Josué Ray,                 MD;  Location: [facility];  Service: Endoscopy;                 Laterality: N/A;  8/25/2022: KNEE ARTHROSCOPY W/ MENISCECTOMY; Left      Comment:  Procedure: ARTHROSCOPY, KNEE, WITH MENISCECTOMY;                 Surgeon: Azar Michelle MD;  Location: [facility];                 Service: Orthopedics;  Laterality: Left;  No date: nerve block      Comment:  for shingles  No date: TONSILLECTOMY  No date: UVULOPALATOPHARYNGOPLASTY    Review of patient's family history indicates:      Social History    Socioeconomic History      Marital status:       Number of children: 4    Tobacco Use      Smoking status: Never      Smokeless tobacco: Never    Substance and Sexual Activity      Alcohol use: Yes        Alcohol/week: 3.0 standard drinks        Types: 3 Cans of beer per week        Comment: weekend      Drug use: No      Sexual activity: Yes        Partners: Female      Current Outpatient Medications:  fluticasone (FLONASE SENSIMIST) 27.5 mcg/actuation nasal spray, 2 sprays by Nasal route once daily., Disp: 6.6 mL, Rfl: 2  montelukast (SINGULAIR) 10 mg tablet, Take 10 mg by mouth once daily., Disp: , Rfl:   multivitamin (THERAGRAN) per tablet, Take 1 tablet by mouth once daily., Disp: , Rfl:       No current facility-administered medications for this visit.      Review of patient's allergies indicates:   -- House dust       Review of Systems   Constitutional:  Negative for activity change and unexpected weight change.   HENT:  Negative for hearing loss, rhinorrhea and trouble swallowing.    Eyes:  Negative for discharge and visual disturbance.   Respiratory:  Negative for chest tightness and wheezing.    Cardiovascular:  Negative for chest pain and palpitations.   Gastrointestinal:  Negative for blood in stool, constipation, diarrhea and vomiting.   Endocrine: Negative for polydipsia and polyuria.   Genitourinary:  Negative for difficulty urinating, hematuria and urgency.   Musculoskeletal:  Negative for  arthralgias, joint swelling and neck pain.   Neurological:  Positive for headaches. Negative for weakness.   Psychiatric/Behavioral:  Negative for confusion and dysphoric mood.         Objective     Physical Exam  Constitutional:       Appearance: Normal appearance.   Pulmonary:      Effort: Pulmonary effort is normal. No respiratory distress.   Skin:     Findings: No erythema or rash.   Neurological:      Mental Status: He is alert and oriented to person, place, and time.      Cranial Nerves: No cranial nerve deficit.   Psychiatric:         Mood and Affect: Mood normal.         Behavior: Behavior normal.          Assessment and Plan     1. Herpes zoster without complication  -     valACYclovir (VALTREX) 1000 MG tablet; Take 1 tablet (1,000 mg total) by mouth 3 (three) times daily. for 7 days  Dispense: 21 tablet; Refill: 0        1. Herpes zoster without complication  Follow up in person if symptoms are not resolving in 48-72 hours, follow up immediately for new or worsening symptoms, ED precautions discussed.    - valACYclovir (VALTREX) 1000 MG tablet; Take 1 tablet (1,000 mg total) by mouth 3 (three) times daily. for 7 days  Dispense: 21 tablet; Refill: 0           No follow-ups on file.

## 2023-06-28 NOTE — PROGRESS NOTES
Answers submitted by the patient for this visit:  Review of Systems Questionnaire (Submitted on 6/27/2023)  activity change: No  unexpected weight change: No  neck pain: No  hearing loss: No  rhinorrhea: No  trouble swallowing: No  eye discharge: No  visual disturbance: No  chest tightness: No  wheezing: No  chest pain: No  palpitations: No  blood in stool: No  constipation: No  vomiting: No  diarrhea: No  polydipsia: No  polyuria: No  difficulty urinating: No  urgency: No  hematuria: No  joint swelling: No  arthralgias: No  headaches: Yes  weakness: No  confusion: No  dysphoric mood: No

## 2023-08-18 NOTE — PATIENT INSTRUCTIONS
Resume prednisolone 2-3 x per day, then taper slowly.   PAST MEDICAL HISTORY:  GI AVM (gastrointestinal arteriovenous vascular malformation)     HTN (hypertension)     Hyperlipidemia

## 2023-09-21 NOTE — PROCEDURES
Large Joint Aspiration/Injection: L knee    Date/Time: 6/27/2022 9:45 AM  Performed by: Azar Michelle MD  Authorized by: Azar Michelle MD     Consent Done?:  Yes (Verbal)  Timeout: prior to procedure the correct patient, procedure, and site was verified    Prep: patient was prepped and draped in usual sterile fashion      Details:  Needle Size:  21 G  Approach:  Anterolateral  Location:  Knee  Site:  L knee  Medications:  20 mg sodium hyaluronate (EUFLEXXA) 10 mg/mL(mw 2.4 -3.6 million)  Patient tolerance:  Patient tolerated the procedure well with no immediate complications       Medication request for pharmacy verified by patient, aware pending approval 24-48 hours.      [facility] doesn't have the medication in stock

## 2023-11-01 ENCOUNTER — TELEPHONE (OUTPATIENT)
Dept: FAMILY MEDICINE | Facility: CLINIC | Age: 63
End: 2023-11-01
Payer: OTHER GOVERNMENT

## 2023-11-02 ENCOUNTER — OFFICE VISIT (OUTPATIENT)
Dept: FAMILY MEDICINE | Facility: CLINIC | Age: 63
End: 2023-11-02
Payer: OTHER GOVERNMENT

## 2023-11-02 ENCOUNTER — TELEPHONE (OUTPATIENT)
Dept: FAMILY MEDICINE | Facility: CLINIC | Age: 63
End: 2023-11-02

## 2023-11-02 VITALS
HEIGHT: 72 IN | HEART RATE: 76 BPM | DIASTOLIC BLOOD PRESSURE: 80 MMHG | SYSTOLIC BLOOD PRESSURE: 112 MMHG | WEIGHT: 246.5 LBS | BODY MASS INDEX: 33.39 KG/M2 | OXYGEN SATURATION: 98 %

## 2023-11-02 DIAGNOSIS — L60.0 INGROWN TOENAIL OF RIGHT FOOT: Primary | ICD-10-CM

## 2023-11-02 PROCEDURE — 99213 OFFICE O/P EST LOW 20 MIN: CPT | Mod: S$PBB,,, | Performed by: INTERNAL MEDICINE

## 2023-11-02 PROCEDURE — 99999 PR PBB SHADOW E&M-EST. PATIENT-LVL IV: CPT | Mod: PBBFAC,,, | Performed by: INTERNAL MEDICINE

## 2023-11-02 PROCEDURE — 99213 PR OFFICE/OUTPT VISIT, EST, LEVL III, 20-29 MIN: ICD-10-PCS | Mod: S$PBB,,, | Performed by: INTERNAL MEDICINE

## 2023-11-02 PROCEDURE — 99999 PR PBB SHADOW E&M-EST. PATIENT-LVL IV: ICD-10-PCS | Mod: PBBFAC,,, | Performed by: INTERNAL MEDICINE

## 2023-11-02 PROCEDURE — 99214 OFFICE O/P EST MOD 30 MIN: CPT | Mod: PBBFAC,PO | Performed by: INTERNAL MEDICINE

## 2023-11-02 NOTE — TELEPHONE ENCOUNTER
The patient has a  referral that needs to be worked out for podiatry. Needs authorization. He is not currently scheduled.

## 2023-11-02 NOTE — TELEPHONE ENCOUNTER
----- Message from Jennifer Handy sent at 11/1/2023  7:20 PM CDT -----  Type:  Sooner Apoointment Request    Caller is requesting a sooner appointment.  Caller declined first available appointment listed below.  Caller will not accept being placed on the waitlist and is requesting a message be sent to doctor.  Name of Caller: Pt  When is the first available appointment?  Symptoms: Big toe nail pain  Would the patient rather a call back or a response via [facility]? Both  Best Call Back Number: [phone]  Additional Information:

## 2023-11-02 NOTE — PROGRESS NOTES
Patient ID: Samir Muñoz is a 63 y.o. male.    Chief Complaint: Toe Pain (Patient states he has been having pain in his toe for about a week and a half. /No flu vaccine )        Assessment and Plan      1. Ingrown toenail of right foot  - Ambulatory referral/consult to Podiatry; Future     To podiatry     HPI     Right big toe medial ingrown toenail.  It is painful and he is about to go on a hiking trip.  Has tried home remedies including clipping the nail.  On exam it does not look infected.    Review of Systems   Constitutional:  Negative for fever.   Respiratory:  Negative for shortness of breath.    Cardiovascular:  Negative for chest pain.   Gastrointestinal:  Negative for abdominal pain.        Objective     Vitals:    11/02/23 0914   BP: 112/80   Pulse: 76     Wt Readings from Last 3 Encounters:   11/02/23 0914 111.8 kg (246 lb 7.6 oz)   05/25/23 1414 112.4 kg (247 lb 12.8 oz)   11/01/22 0856 120.2 kg (264 lb 15.9 oz)      Body mass index is 33.43 kg/m².   Physical Exam  Cardiovascular:      Rate and Rhythm: Normal rate and regular rhythm.      Heart sounds: No murmur heard.     No gallop.   Pulmonary:      Breath sounds: Normal breath sounds. No wheezing or rhonchi.   Abdominal:      Palpations: Abdomen is soft.      Tenderness: There is no abdominal tenderness.   Musculoskeletal:        Feet:           Medication List with Changes/Refills   Current Medications    FLUTICASONE (FLONASE SENSIMIST) 27.5 MCG/ACTUATION NASAL SPRAY    2 sprays by Nasal route once daily.    MONTELUKAST (SINGULAIR) 10 MG TABLET    Take 10 mg by mouth once daily.    MULTIVITAMIN (THERAGRAN) PER TABLET    Take 1 tablet by mouth once daily.    VALACYCLOVIR (VALTREX) 1000 MG TABLET    Take 1 tablet (1,000 mg total) by mouth 3 (three) times daily. for 7 days       I personally reviewed past medical, family and social history.    Patient Active Problem List   Diagnosis    Post herpetic neuralgia    Hyperlipidemia    Chronic  rhinitis    Benign prostatic hyperplasia with urinary obstruction    Posterior subcapsular age-related cataract of left eye    Nuclear sclerosis, bilateral    S/P UPPP (uvulopalatopharyngoplasty)    Obstructive sleep apnea syndrome    Gastroesophageal reflux disease

## 2024-01-24 ENCOUNTER — OFFICE VISIT (OUTPATIENT)
Dept: DERMATOLOGY | Facility: CLINIC | Age: 64
End: 2024-01-24
Payer: OTHER GOVERNMENT

## 2024-01-24 DIAGNOSIS — L82.1 SEBORRHEIC KERATOSES: ICD-10-CM

## 2024-01-24 DIAGNOSIS — L71.9 ROSACEA: Primary | ICD-10-CM

## 2024-01-24 DIAGNOSIS — L57.0 ACTINIC KERATOSIS: ICD-10-CM

## 2024-01-24 DIAGNOSIS — L73.8 SEBACEOUS HYPERPLASIA: ICD-10-CM

## 2024-01-24 PROCEDURE — 99213 OFFICE O/P EST LOW 20 MIN: CPT | Mod: 25,AQ,S$GLB, | Performed by: STUDENT IN AN ORGANIZED HEALTH CARE EDUCATION/TRAINING PROGRAM

## 2024-01-24 PROCEDURE — 17000 DESTRUCT PREMALG LESION: CPT | Mod: AQ,S$GLB,, | Performed by: STUDENT IN AN ORGANIZED HEALTH CARE EDUCATION/TRAINING PROGRAM

## 2024-01-24 NOTE — PATIENT INSTRUCTIONS

## 2024-01-24 NOTE — PROGRESS NOTES
Subjective:      Patient ID:  Samir Muñoz is a 63 y.o. male who presents for   Chief Complaint   Patient presents with    Spot     face     LOV 11/2/22    Patient here today for spot on nose x approx 1 year. Will crack open and bleed. Heals a little but never fully.    Also complains of bumps on cheeks.    Still using SM triple cream for rosacea. Only uses as needed.     Denies Phx of NMSC  Fhx: maternal grandfather had MM             Review of Systems   Constitutional:  Negative for fever, chills and fatigue.   Respiratory:  Negative for cough and shortness of breath.    Gastrointestinal:  Negative for nausea and vomiting.   Skin:  Positive for wears hat. Negative for daily sunscreen use and activity-related sunscreen use.   Hematologic/Lymphatic: Bruises/bleeds easily.       Objective:   Physical Exam   Constitutional: He appears well-developed and well-nourished.   Neurological: He is alert and oriented to person, place, and time.   Psychiatric: He has a normal mood and affect.   Skin:   Areas Examined (abnormalities noted in diagram):   Head / Face Inspection Performed            Diagram Legend     Erythematous scaling macule/papule c/w actinic keratosis       Vascular papule c/w angioma      Pigmented verrucoid papule/plaque c/w seborrheic keratosis      Yellow umbilicated papule c/w sebaceous hyperplasia      Irregularly shaped tan macule c/w lentigo     1-2 mm smooth white papules consistent with Milia      Movable subcutaneous cyst with punctum c/w epidermal inclusion cyst      Subcutaneous movable cyst c/w pilar cyst      Firm pink to brown papule c/w dermatofibroma      Pedunculated fleshy papule(s) c/w skin tag(s)      Evenly pigmented macule c/w junctional nevus     Mildly variegated pigmented, slightly irregular-bordered macule c/w mildly atypical nevus      Flesh colored to evenly pigmented papule c/w intradermal nevus       Pink pearly papule/plaque c/w basal cell carcinoma       Erythematous hyperkeratotic cursted plaque c/w SCC      Surgical scar with no sign of skin cancer recurrence      Open and closed comedones      Inflammatory papules and pustules      Verrucoid papule consistent consistent with wart     Erythematous eczematous patches and plaques     Dystrophic onycholytic nail with subungual debris c/w onychomycosis     Umbilicated papule    Erythematous-base heme-crusted tan verrucoid plaque consistent with inflamed seborrheic keratosis     Erythematous Silvery Scaling Plaque c/w Psoriasis     See annotation      Assessment / Plan:        Rosacea  Clear today  Uses SM triple cream as needed for flares, does not need refills today    Actinic keratosis  Cryosurgery Procedure Note    Verbal consent from the patient is obtained and the patient is aware of the precancerous quality and need for treatment of these lesions. Liquid nitrogen cryosurgery is applied to the 1 actinic keratoses, as detailed in the physical exam, to produce a freeze injury. The patient is aware that blisters may form and is instructed on wound care with gentle cleansing and use of vaseline ointment to keep moist until healed. The patient is supplied a handout on cryosurgery and is instructed to call if lesions do not completely resolve.    Seborrheic keratoses  These are benign inherited growths without a malignant potential. Reassurance given to patient. No treatment is necessary.   Discussed risks and benefits of LN2    Sebaceous hyperplasia  This is a common condition representing benign enlargement of the sebaceous lobule. It typically occurs in adulthood. Reassurance given to patient.              No follow-ups on file.

## 2024-03-11 ENCOUNTER — TELEPHONE (OUTPATIENT)
Dept: PODIATRY | Facility: CLINIC | Age: 64
End: 2024-03-11
Payer: OTHER GOVERNMENT

## 2024-03-13 ENCOUNTER — OFFICE VISIT (OUTPATIENT)
Dept: PODIATRY | Facility: CLINIC | Age: 64
End: 2024-03-13
Payer: OTHER GOVERNMENT

## 2024-03-13 ENCOUNTER — OFFICE VISIT (OUTPATIENT)
Dept: FAMILY MEDICINE | Facility: CLINIC | Age: 64
End: 2024-03-13
Payer: OTHER GOVERNMENT

## 2024-03-13 VITALS
HEIGHT: 74 IN | WEIGHT: 256.81 LBS | OXYGEN SATURATION: 97 % | DIASTOLIC BLOOD PRESSURE: 72 MMHG | SYSTOLIC BLOOD PRESSURE: 138 MMHG | BODY MASS INDEX: 32.96 KG/M2 | HEART RATE: 82 BPM

## 2024-03-13 VITALS — HEIGHT: 74 IN | BODY MASS INDEX: 30.8 KG/M2 | WEIGHT: 240 LBS

## 2024-03-13 DIAGNOSIS — L60.0 INGROWN TOENAIL OF RIGHT FOOT: ICD-10-CM

## 2024-03-13 DIAGNOSIS — R53.83 FATIGUE, UNSPECIFIED TYPE: Primary | ICD-10-CM

## 2024-03-13 PROCEDURE — 99999 PR PBB SHADOW E&M-EST. PATIENT-LVL III: CPT | Mod: PBBFAC,,, | Performed by: PODIATRIST

## 2024-03-13 PROCEDURE — 99213 OFFICE O/P EST LOW 20 MIN: CPT | Mod: PBBFAC,PO | Performed by: FAMILY MEDICINE

## 2024-03-13 PROCEDURE — 99203 OFFICE O/P NEW LOW 30 MIN: CPT | Mod: S$PBB,,, | Performed by: PODIATRIST

## 2024-03-13 PROCEDURE — 99213 OFFICE O/P EST LOW 20 MIN: CPT | Mod: S$PBB,,, | Performed by: FAMILY MEDICINE

## 2024-03-13 PROCEDURE — 99999 PR PBB SHADOW E&M-EST. PATIENT-LVL III: CPT | Mod: PBBFAC,,, | Performed by: FAMILY MEDICINE

## 2024-03-13 PROCEDURE — 99213 OFFICE O/P EST LOW 20 MIN: CPT | Mod: PBBFAC,27,PO | Performed by: PODIATRIST

## 2024-03-13 NOTE — PROGRESS NOTES
Subjective:      Patient ID: Samir Muñoz is a 63 y.o. male.    Chief Complaint: Ingrown Toenail    Samir is a 63 y.o. male who presents to the clinic complaining of painful ingrown toenail on both feet great toes mostly the lateral borders bother him but sometimes the medial borders do as well. Pain mostly in shoe wear with pressure when active.     Review of Systems   Constitutional: Negative for chills and fever.   Cardiovascular:  Negative for claudication and leg swelling.   Respiratory:  Negative for shortness of breath.    Skin:  Positive for nail changes. Negative for itching and rash.   Musculoskeletal:  Negative for muscle cramps, muscle weakness and myalgias.   Gastrointestinal:  Negative for nausea and vomiting.   Neurological:  Negative for focal weakness, loss of balance, numbness and paresthesias.           Objective:      Physical Exam  Constitutional:       General: He is not in acute distress.     Appearance: He is well-developed. He is not diaphoretic.   Cardiovascular:      Pulses:           Dorsalis pedis pulses are 2+ on the right side and 2+ on the left side.        Posterior tibial pulses are 2+ on the right side and 2+ on the left side.      Comments: < 3 sec capillary refill time to toes 1-5 bilateral. Toes and feet are warm to touch proximally with normal distal cooling b/l. There is some hair growth on the feet and toes b/l. There is no edema b/l. No spider veins or varicosities present b/l.     Musculoskeletal:      Comments: Equinus noted b/l ankles with < 10 deg DF noted. MMT 5/5 in DF/PF/Inv/Ev resistance with no reproduction of pain in any direction. Passive range of motion of ankle and pedal joints is painless b/l.     Skin:     General: Skin is warm and dry.      Coloration: Skin is not pale.      Findings: No abrasion, bruising, burn, ecchymosis, erythema, laceration, lesion, petechiae or rash.      Nails: There is no clubbing.      Comments: Skin temperature, texture and  turgor within normal limits.    Both hallux nail margins of both feet with ingrown nail plate. No  erythema and minimal edema is noted there is No granuloma formation noted. No drainage or malodor but there is pain with palpation       Neurological:      Mental Status: He is alert and oriented to person, place, and time.      Sensory: No sensory deficit.      Motor: No tremor, atrophy or abnormal muscle tone.      Comments: Negative tinel sign bilateral.   Psychiatric:         Behavior: Behavior normal.               Assessment:       Encounter Diagnosis   Name Primary?    Ingrown toenail of right foot          Plan:       Samir was seen today for ingrown toenail.    Diagnoses and all orders for this visit:    Ingrown toenail of right foot  -     Ambulatory referral/consult to Podiatry      I counseled the patient on his conditions, their implications and medical management.    Discussed  trimming out the nail he has been doing this but it keeps coming back    For recurring issues I recommended doing a PNA with phenol matrixectomy, he is amendable to this plan    Will return for ingrown nail procedure both borders of both hallux nails next available    Return for procedure    Tacos Monterroso DPM

## 2024-03-13 NOTE — PROGRESS NOTES
Subjective:       Patient ID: Samir Muñoz is a 63 y.o. male.    Chief Complaint: Fatigue (Would like his Testosterone level checked)    Here for acute visit for fatigue for 6 months.    Fatigue  Associated symptoms include fatigue and weakness. Pertinent negatives include no arthralgias, chest pain, chills, coughing, fever, headaches, joint swelling, neck pain or vomiting.     Review of Systems   Constitutional:  Positive for fatigue. Negative for activity change, chills, fever and unexpected weight change.   HENT:  Positive for rhinorrhea. Negative for hearing loss and trouble swallowing.    Eyes:  Negative for discharge and visual disturbance.   Respiratory:  Negative for cough, chest tightness, shortness of breath and wheezing.    Cardiovascular:  Negative for chest pain, palpitations and leg swelling.   Gastrointestinal:  Negative for blood in stool, constipation, diarrhea and vomiting.   Endocrine: Negative for cold intolerance, heat intolerance, polydipsia and polyuria.   Genitourinary:  Negative for difficulty urinating, hematuria and urgency.   Musculoskeletal:  Negative for arthralgias, joint swelling and neck pain.   Neurological:  Positive for weakness. Negative for headaches.   Psychiatric/Behavioral:  Negative for confusion, decreased concentration and dysphoric mood. The patient is not nervous/anxious.        Objective:      Physical Exam  Vitals and nursing note reviewed.   Constitutional:       Appearance: He is well-developed.   HENT:      Head: Normocephalic and atraumatic.   Cardiovascular:      Rate and Rhythm: Normal rate and regular rhythm.      Heart sounds: Normal heart sounds.   Pulmonary:      Effort: Pulmonary effort is normal.      Breath sounds: Normal breath sounds.   Psychiatric:         Mood and Affect: Mood normal.         Behavior: Behavior normal.         Assessment:       1. Fatigue, unspecified type        Plan:       Fatigue, unspecified type  -     CBC Without  Differential; Future; Expected date: 03/13/2024  -     Comprehensive Metabolic Panel; Future; Expected date: 03/13/2024  -     TESTOSTERONE; Future; Expected date: 03/13/2024  -     TSH; Future; Expected date: 03/13/2024        Wellness in summer  Update labs   Twin brother with low T and he would like checked  Update EKG if needed  Follow up if symptoms worsen or fail to improve.

## 2024-03-15 ENCOUNTER — LAB VISIT (OUTPATIENT)
Dept: LAB | Facility: HOSPITAL | Age: 64
End: 2024-03-15
Attending: FAMILY MEDICINE
Payer: OTHER GOVERNMENT

## 2024-03-15 DIAGNOSIS — R53.83 FATIGUE, UNSPECIFIED TYPE: ICD-10-CM

## 2024-03-15 LAB
ALBUMIN SERPL BCP-MCNC: 4 G/DL (ref 3.5–5.2)
ALP SERPL-CCNC: 78 U/L (ref 55–135)
ALT SERPL W/O P-5'-P-CCNC: 41 U/L (ref 10–44)
ANION GAP SERPL CALC-SCNC: 7 MMOL/L (ref 8–16)
AST SERPL-CCNC: 27 U/L (ref 10–40)
BILIRUB SERPL-MCNC: 0.5 MG/DL (ref 0.1–1)
BUN SERPL-MCNC: 19 MG/DL (ref 8–23)
CALCIUM SERPL-MCNC: 9.9 MG/DL (ref 8.7–10.5)
CHLORIDE SERPL-SCNC: 107 MMOL/L (ref 95–110)
CO2 SERPL-SCNC: 25 MMOL/L (ref 23–29)
CREAT SERPL-MCNC: 1.1 MG/DL (ref 0.5–1.4)
ERYTHROCYTE [DISTWIDTH] IN BLOOD BY AUTOMATED COUNT: 13.2 % (ref 11.5–14.5)
EST. GFR  (NO RACE VARIABLE): >60 ML/MIN/1.73 M^2
GLUCOSE SERPL-MCNC: 95 MG/DL (ref 70–110)
HCT VFR BLD AUTO: 46.9 % (ref 40–54)
HGB BLD-MCNC: 16.1 G/DL (ref 14–18)
MCH RBC QN AUTO: 30.1 PG (ref 27–31)
MCHC RBC AUTO-ENTMCNC: 34.3 G/DL (ref 32–36)
MCV RBC AUTO: 88 FL (ref 82–98)
PLATELET # BLD AUTO: 261 K/UL (ref 150–450)
PMV BLD AUTO: 10.8 FL (ref 9.2–12.9)
POTASSIUM SERPL-SCNC: 4.9 MMOL/L (ref 3.5–5.1)
PROT SERPL-MCNC: 7.5 G/DL (ref 6–8.4)
RBC # BLD AUTO: 5.35 M/UL (ref 4.6–6.2)
SODIUM SERPL-SCNC: 139 MMOL/L (ref 136–145)
TESTOST SERPL-MCNC: 316 NG/DL (ref 304–1227)
TSH SERPL DL<=0.005 MIU/L-ACNC: 1.19 UIU/ML (ref 0.4–4)
WBC # BLD AUTO: 4.91 K/UL (ref 3.9–12.7)

## 2024-03-15 PROCEDURE — 85027 COMPLETE CBC AUTOMATED: CPT | Performed by: FAMILY MEDICINE

## 2024-03-15 PROCEDURE — 36415 COLL VENOUS BLD VENIPUNCTURE: CPT | Mod: PN | Performed by: FAMILY MEDICINE

## 2024-03-15 PROCEDURE — 84403 ASSAY OF TOTAL TESTOSTERONE: CPT | Performed by: FAMILY MEDICINE

## 2024-03-15 PROCEDURE — 84443 ASSAY THYROID STIM HORMONE: CPT | Performed by: FAMILY MEDICINE

## 2024-03-15 PROCEDURE — 80053 COMPREHEN METABOLIC PANEL: CPT | Performed by: FAMILY MEDICINE

## 2024-03-16 ENCOUNTER — PATIENT MESSAGE (OUTPATIENT)
Dept: FAMILY MEDICINE | Facility: CLINIC | Age: 64
End: 2024-03-16
Payer: OTHER GOVERNMENT

## 2024-03-20 ENCOUNTER — E-VISIT (OUTPATIENT)
Dept: FAMILY MEDICINE | Facility: CLINIC | Age: 64
End: 2024-03-20
Payer: OTHER GOVERNMENT

## 2024-03-20 ENCOUNTER — PATIENT MESSAGE (OUTPATIENT)
Dept: FAMILY MEDICINE | Facility: CLINIC | Age: 64
End: 2024-03-20

## 2024-03-20 ENCOUNTER — DOCUMENTATION ONLY (OUTPATIENT)
Dept: FAMILY MEDICINE | Facility: CLINIC | Age: 64
End: 2024-03-20

## 2024-03-20 DIAGNOSIS — R79.89 LOW TESTOSTERONE: Primary | ICD-10-CM

## 2024-03-20 PROCEDURE — 99421 OL DIG E/M SVC 5-10 MIN: CPT | Mod: ,,, | Performed by: FAMILY MEDICINE

## 2024-03-20 NOTE — PROGRESS NOTES
Subjective:       Patient ID: Samir Muñoz is a 63 y.o. male.    Chief Complaint: Medication Management (Entered automatically based on patient selection in Patient Portal.)    Patient ID: Samir Muñoz is a 63 y.o. male.    Chief Complaint: Medication Management (Entered automatically based on patient selection in Patient Portal.)    The patient initiated a request through [facility] on 3/20/2024 for evaluation and management with a chief complaint of Medication Management (Entered automatically based on patient selection in Patient Portal.)     I evaluated the questionnaire submission on 3-20-24.    [facility] P[facility] Evisit Medication    3/20/2024  7:17 AM CDT - Filed by Patient   Do you agree to participate in an E-Visit? Yes   If you have any of the following symptoms, please present to your local emergency room or call 911:  I acknowledge   Medication requests for narcotics will not be addressed via an E-Visit.  Please schedule an appointment. I acknowledge   Do you want to address a new or existing medication? I would like to start a new medication that I do not already take     [facility] Peq Evisit Medication [facility]    3/20/2024  7:17 AM CDT - Filed by Patient   What is the name of the medication that you would like to start? testosterone   Have you taken a similar medication in the past? No   Why are you requesting this particular medication? testosterone is low    What medical condition is the  medication intended to treat? lethargy , fatigue   Are you able to take your vital signs? No         Encounter Diagnosis   Name Primary?    Low testosterone Yes        No orders of the defined types were placed in this encounter.     Medications Ordered This Encounter   Medications    testosterone (ANDRODERM) 2 mg/24 hour PT24     Sig: Place 2 mg onto the skin once daily.     Dispense:  60 patch     Refill:  2        Follow up in about 3 months (around 6/20/2024), or if symptoms worsen or fail to  improve.      E-Visit Time Trackin minutes                           Objective:          Assessment:       1. Low testosterone        Plan:       Low testosterone  -     testosterone (ANDRODERM) 2 mg/24 hour PT24; Place 2 mg onto the skin once daily.  Dispense: 60 patch; Refill: 2      Treat and monitor; keep follow ups  Had discussed supplement at last office visit  Follow up in about 3 months (around 2024), or if symptoms worsen or fail to improve.

## 2024-03-20 NOTE — PROGRESS NOTES
Approved  Prior Authorization Portal   Prior authorization approved Case ID: [id number]      Payer: [employer]   CaseId:[id number];Status:Approved;Review Type:Prior Auth;Coverage Start Date:2024;Coverage End Date:2025;   Approval Details    Authorized from 2024 to 2025      Electronic appeal: Not supported   View History    Medication Being Authorized     testosterone (ANDRODERM) 2 mg/24 hour PT24    Place 2 mg onto the skin once daily.    Dispense: 60 patch Refills: 2     Start: 3/20/2024      Class: Normal Diagnoses: Low testosterone     This order has been released to its destination.   To be filled at: [id number]  [facility]         Pharmacy Benefits     JAMI ARAYA  -  [employer] ([employer])    Covered: Retail, Mail Order    Unknown: Specialty, Long-Term Care   Member ID: [id number]   Group ID: [id number]   Group name: [id number] OF [id number]    BIN: [id number]   PCN: [id number]    : 1960   Legal sex: M   Address:  [facility] [address]

## 2024-03-20 NOTE — PROGRESS NOTES
Pending approval   Approved  Prior Authorization Portal   Prior authorization approved Case ID: [id number]      Payer: [employer]   CaseId:[id number];Status:Approved;Review Type:Prior Auth;Coverage Start Date:02/19/2024;Coverage End Date:03/20/2025;   Approval Details    Authorized from February 19, 2024 to March 20, 2025

## 2024-03-22 ENCOUNTER — TELEPHONE (OUTPATIENT)
Dept: FAMILY MEDICINE | Facility: CLINIC | Age: 64
End: 2024-03-22
Payer: OTHER GOVERNMENT

## 2024-03-22 DIAGNOSIS — R79.89 LOW TESTOSTERONE: ICD-10-CM

## 2024-03-22 NOTE — TELEPHONE ENCOUNTER
Spoke with pharmacy and she stated that the Testosterone( Androderm ) Patches only come in boxes of 60, they can't fill the 30 patches.

## 2024-03-22 NOTE — TELEPHONE ENCOUNTER
[10:00 AM] Angie Chaney  Insurer will only approve a 30 day supply and the pharmacist states they can not break the pack of testosterone packs--they request a new RX for a qty of 30. Pt has already sent a msg inquiring about his meds.

## 2024-03-22 NOTE — TELEPHONE ENCOUNTER
Patient's insurance would only cover a 30 day supply of his testosterone patches. Michelle, pharmacist is requesting a new RX with the qty of 30 because they can not break the packaging. Please send new RX with a qty of 30 if appropriate.

## 2024-03-22 NOTE — TELEPHONE ENCOUNTER
----- Message from Loan Peng sent at 3/22/2024 11:32 AM CDT -----  Regarding: pharmacy  Contact: Yolanda with [facility]  Type:  Pharmacy Calling to Clarify an RX    Name of Caller:  Yolanda  Pharmacy Name:  [facility]  Prescription Name:  testosterone (ANDRODERM) 2 mg/24 hour PT24  What do they need to clarify?:  directions  Best Call Back Number:  [phone]  Additional Information:  Please call Yolanda to advise.  Thanks!

## 2024-03-22 NOTE — TELEPHONE ENCOUNTER
No care due was identified.  [facility] Embedded Care Due Messages. Reference number: [id number].   3/22/2024 9:57:01 AM CDT

## 2024-03-26 ENCOUNTER — TELEPHONE (OUTPATIENT)
Dept: FAMILY MEDICINE | Facility: CLINIC | Age: 64
End: 2024-03-26
Payer: OTHER GOVERNMENT

## 2024-03-26 NOTE — TELEPHONE ENCOUNTER
Patient stated that he just checked with his pharmacy and they are still refusing to fill his prescription due to the pack being a 60 day supply instead of a 30 supply. Would like for DR. Black to change the prescription.

## 2024-03-26 NOTE — TELEPHONE ENCOUNTER
----- Message from Amber Puri sent at 3/26/2024 11:58 AM CDT -----  Regarding: Needs return call  Type: Needs Medical Advice  Who Called:  Samir Coley Call Back Number: [phone]    Additional Information: Pt was needing to know if sidney approved his script, please call to edwin

## 2024-03-26 NOTE — TELEPHONE ENCOUNTER
I've sent both so unsure how I can help; can let him know the pharmacy needs to request the correct amount.

## 2024-03-27 NOTE — TELEPHONE ENCOUNTER
Patient stated that he will check with pharmacy today and find out if the will release his medication.

## 2024-03-30 ENCOUNTER — PATIENT MESSAGE (OUTPATIENT)
Dept: FAMILY MEDICINE | Facility: CLINIC | Age: 64
End: 2024-03-30
Payer: OTHER GOVERNMENT

## 2024-03-30 DIAGNOSIS — R79.89 LOW TESTOSTERONE: ICD-10-CM

## 2024-04-02 NOTE — TELEPHONE ENCOUNTER
No care due was identified.  [facility] Embedded Care Due Messages. Reference number: [id number].   4/02/2024 5:00:14 PM CDT

## 2024-04-07 DIAGNOSIS — E34.9 TESTOSTERONE DEFICIENCY: Primary | ICD-10-CM

## 2024-04-07 NOTE — TELEPHONE ENCOUNTER
I spoke to Helga at pt's pharmacy whom states pt's Androderm is not going through, insurance is only covering for 30 days and Androderm comes in a qty of 60. I notified Helga I would call the insurance to get a prior authorization status- our system shows approved and I would call her back.

## 2024-04-07 NOTE — TELEPHONE ENCOUNTER
I contacted pt's pharmacy again to speak to Helga to notify her what pt's insurance company stated concerning the Androderm. Helga was working the drive through so Yolanda took the call and states she will have to contact [employer].

## 2024-04-07 NOTE — TELEPHONE ENCOUNTER
I spoke to Alyssa at pt's insurance company to initiate another prior authorization for Androderm. Alyssa pulled pt up and saw there is already an approval for the Androderm however the pt's plan has a qty limit of 30. Alyssa states no additional prior authorization is needed that the pharmacy need to contact the pharmacy help desk for assistance in dispensing.

## 2024-04-07 NOTE — TELEPHONE ENCOUNTER
I spoke with Mr. Muñoz and updated him on the status of his Androderm. I explained to the patient his prior authorization was approved however there is a qty limit of 30 for 30 days and the patches only come in a qty of 60. Pt notified Alyssa from his insurance states no additional prior authorization is needed and she said the pharmacy would need to contact the pharmacy help desk for assistance with how to dispense. Pt voiced understanding.

## 2024-04-08 ENCOUNTER — OFFICE VISIT (OUTPATIENT)
Dept: PODIATRY | Facility: CLINIC | Age: 64
End: 2024-04-08
Payer: OTHER GOVERNMENT

## 2024-04-08 VITALS — HEIGHT: 72 IN | WEIGHT: 240.06 LBS | BODY MASS INDEX: 32.52 KG/M2

## 2024-04-08 DIAGNOSIS — L60.0 INGROWN NAIL OF GREAT TOE OF LEFT FOOT: ICD-10-CM

## 2024-04-08 DIAGNOSIS — L60.0 INGROWN NAIL OF GREAT TOE OF RIGHT FOOT: Primary | ICD-10-CM

## 2024-04-08 PROCEDURE — 11750 EXCISION NAIL&NAIL MATRIX: CPT | Mod: TA,PBBFAC,PO | Performed by: PODIATRIST

## 2024-04-08 PROCEDURE — 99999 PR PBB SHADOW E&M-EST. PATIENT-LVL III: CPT | Mod: PBBFAC,,, | Performed by: PODIATRIST

## 2024-04-08 PROCEDURE — 99499 UNLISTED E&M SERVICE: CPT | Mod: S$PBB,,, | Performed by: PODIATRIST

## 2024-04-08 PROCEDURE — 99213 OFFICE O/P EST LOW 20 MIN: CPT | Mod: PBBFAC,PO | Performed by: PODIATRIST

## 2024-04-08 NOTE — TELEPHONE ENCOUNTER
Wanda notified Theres from his pharmacy states Androderm is a  discontinued item , Dr. Black notified via phone message and once provider review and make a decision on what to order Dr. Black's clinical staff will notify him. Pt voiced understanding.

## 2024-04-08 NOTE — PROCEDURES
Nail Removal    Date/Time: 4/8/2024 5:15 PM    Performed by: Tacos Monterroso DPM  Authorized by: Tacos Monterroso DPM    Consent Done?:  Yes (Written)  Time out: Immediately prior to the procedure a time out was called    Prep: patient was prepped and draped in usual sterile fashion    Location:     Location:  Left foot    Location detail:  Left big toe  Anesthesia:     Anesthesia:  Digital block    Local anesthetic:  Lidocaine 1% without epinephrine    Anesthetic total (ml):  5  Procedure Details:     Preparation:  Skin prepped with alcohol and skin prepped with Betadine    Amount removed:  Partial    Side:  Bilateral    Wedge excision of skin of nail fold: No      Nail bed sutured?: No      Nail matrix removed:  Partial    Removal method:  Phenol and alcohol    Dressing applied:  4x4, antibiotic ointment and dressing applied    Patient tolerance:  Patient tolerated the procedure well with no immediate complications

## 2024-04-08 NOTE — TELEPHONE ENCOUNTER
I spoke to Chucky this morning from pt's pharmacy. Chucky states she ordered the Androderm patches and received notification this morning that Androderm patches are a  discontinued item. She states the Gel and Pump are still available.   Please send alternative RX if appropriate.

## 2024-04-08 NOTE — PROCEDURES
Nail Removal    Date/Time: 4/8/2024 5:15 PM    Performed by: Tacos Monterroso DPM  Authorized by: Tacos Monterroso DPM    Consent Done?:  Yes (Written)  Time out: Immediately prior to the procedure a time out was called    Prep: patient was prepped and draped in usual sterile fashion    Location:     Location:  Right foot    Location detail:  Right big toe  Anesthesia:     Anesthesia:  Digital block    Local anesthetic:  Lidocaine 1% without epinephrine    Anesthetic total (ml):  5  Procedure Details:     Preparation:  Skin prepped with alcohol and skin prepped with Betadine    Amount removed:  Partial    Side:  Bilateral    Wedge excision of skin of nail fold: No      Nail bed sutured?: No      Nail matrix removed:  Partial    Removal method:  Phenol and alcohol    Dressing applied:  4x4, antibiotic ointment and dressing applied    Patient tolerance:  Patient tolerated the procedure well with no immediate complications

## 2024-04-09 RX ORDER — TESTOSTERONE 10 MG/.5G
10 GEL, METERED TOPICAL DAILY
Qty: 60 G | Refills: 1 | Status: SHIPPED | OUTPATIENT
Start: 2024-04-09 | End: 2024-04-30 | Stop reason: SDUPTHER

## 2024-04-09 NOTE — TELEPHONE ENCOUNTER
Refill Routing Note   Medication(s) are not appropriate for processing by [facility] for the following reason(s):        Other: [place]/23    OR action(s):  Defer    Pended 2 choices closest to patient's previous dosage. Both scripts will require a PA and may have the same quantity limits as Androderm due to the patient's low dose.   Fortesta is a gel pump and patient will apply 1 pump (10 mg) to the thigh.  Natesto is a nasal pump and patient will apply 1 pump per nostril (5.5 mg per pump & 11 mg total) daily.         Appointments  past 12m or future 3m with PCP    Date Provider   Last Visit   3/20/2024 HOLDEN Black MD   Next Visit   Visit date not found HOLDEN Black MD   ED visits in past 90 days: 0        Note composed:4:07 PM 04/09/2024

## 2024-04-09 NOTE — TELEPHONE ENCOUNTER
No care due was identified.  [facility] Embedded Care Due Messages. Reference number: [id number].   4/09/2024 4:15:55 PM CDT

## 2024-04-10 ENCOUNTER — PATIENT MESSAGE (OUTPATIENT)
Dept: FAMILY MEDICINE | Facility: CLINIC | Age: 64
End: 2024-04-10
Payer: OTHER GOVERNMENT

## 2024-04-10 ENCOUNTER — DOCUMENTATION ONLY (OUTPATIENT)
Dept: FAMILY MEDICINE | Facility: CLINIC | Age: 64
End: 2024-04-10
Payer: OTHER GOVERNMENT

## 2024-04-10 NOTE — PROGRESS NOTES
Approved  Prior Authorization Portal   Prior authorization approved Case ID: [id number]      Payer: [employer]   CaseId:[id number];Status:Approved;Review Type:Prior Auth;Coverage Start Date:2024;Coverage End Date:04/10/2025;   Approval Details    Authorized from 2024 to April 10, 2025      Electronic appeal: Not supported   View History     Medication Being Authorized     testosterone 10 mg/0.5 gram /actuation GlPm    Place 10 mg onto the skin once daily.    Dispense: 60 g Refills: 1     Start: 2024 End: 10/8/2024     Class: Normal Diagnoses: Testosterone deficiency     This order has been released to its destination.   To be filled at: [id number]  [facility] AT [facility]        Pharmacy Benefits     JAMI ARAYA  -  [employer] ([employer])    Covered: Retail, Mail Order    Unknown: Specialty, Long-Term Care   Member ID: [id number]   Group ID: [id number]   Group name: DEPARTMENT OF DEFENSE    BIN: [id number]   PCN: [id number]    : 1960   Legal sex: M   Address: [address] [place] [address]

## 2024-04-22 ENCOUNTER — TELEPHONE (OUTPATIENT)
Dept: PODIATRY | Facility: CLINIC | Age: 64
End: 2024-04-22
Payer: OTHER GOVERNMENT

## 2024-04-22 NOTE — TELEPHONE ENCOUNTER
Cancelled appt d/t pt being sick. Patient reports he will send a picture of the PO nail by [employer] message

## 2024-04-22 NOTE — PROGRESS NOTES
Subjective:      Patient ID: Samir Muñoz is a 63 y.o. male.    Chief Complaint: Ingrown Toenail (Bilateral ingrown toenails, bilateral borders procedure)    Samir is a 63 y.o. male who presents to the clinic complaining of painful ingrown toenail on both feet great toes mostly the lateral borders bother him but sometimes the medial borders do as well. Pain mostly in shoe wear with pressure when active.     4/8/24: Patient returns for bilateral ingrown nail procedures both borders as planned for recurring ingrown nail issues    Review of Systems   Constitutional: Negative for chills and fever.   Cardiovascular:  Negative for claudication and leg swelling.   Respiratory:  Negative for shortness of breath.    Skin:  Positive for nail changes. Negative for itching and rash.   Musculoskeletal:  Negative for muscle cramps, muscle weakness and myalgias.   Gastrointestinal:  Negative for nausea and vomiting.   Neurological:  Negative for focal weakness, loss of balance, numbness and paresthesias.           Objective:      Physical Exam  Constitutional:       General: He is not in acute distress.     Appearance: He is well-developed. He is not diaphoretic.   Cardiovascular:      Pulses:           Dorsalis pedis pulses are 2+ on the right side and 2+ on the left side.        Posterior tibial pulses are 2+ on the right side and 2+ on the left side.      Comments: < 3 sec capillary refill time to toes 1-5 bilateral. Toes and feet are warm to touch proximally with normal distal cooling b/l. There is some hair growth on the feet and toes b/l. There is no edema b/l. No spider veins or varicosities present b/l.     Musculoskeletal:      Comments: Equinus noted b/l ankles with < 10 deg DF noted. MMT 5/5 in DF/PF/Inv/Ev resistance with no reproduction of pain in any direction. Passive range of motion of ankle and pedal joints is painless b/l.     Skin:     General: Skin is warm and dry.      Coloration: Skin is not pale.      " Findings: No abrasion, bruising, burn, ecchymosis, erythema, laceration, lesion, petechiae or rash.      Nails: There is no clubbing.      Comments: Skin temperature, texture and turgor within normal limits.    Both hallux nail margins of both feet with ingrown nail plate. No  erythema and minimal edema is noted there is No granuloma formation noted. No drainage or malodor but there is pain with palpation       Neurological:      Mental Status: He is alert and oriented to person, place, and time.      Sensory: No sensory deficit.      Motor: No tremor, atrophy or abnormal muscle tone.      Comments: Negative tinel sign bilateral.   Psychiatric:         Behavior: Behavior normal.               Assessment:       Encounter Diagnoses   Name Primary?    Ingrown nail of great toe of right foot Yes    Ingrown nail of great toe of left foot          Plan:       Samir Florian" was seen today for ingrown toenail.    Diagnoses and all orders for this visit:    Ingrown nail of great toe of right foot  -     Nail Removal    Ingrown nail of great toe of left foot  -     Nail Removal      I counseled the patient on his conditions, their implications and medical management.    Discussed  trimming out the nail he has been doing this but it keeps coming back    Discussed the options of slant back vs permanent removal of offending corners of nail. Patient opted for more permanent solution due to recurrent issues with the nails. All alternatives, risks and complications were discussed in detail with patient regarding phenol matrixectomy. Patient elected for this procedure on both borders of both great toes. Informed consent was discussed in detail and signed/witnessed. Procedure note separate.     Written post op instructions dispensed    RTC 10-14 days, sooner PRN       Tacos Monterroso DPM                   "

## 2024-04-23 ENCOUNTER — PATIENT MESSAGE (OUTPATIENT)
Dept: PODIATRY | Facility: CLINIC | Age: 64
End: 2024-04-23
Payer: OTHER GOVERNMENT

## 2024-04-30 ENCOUNTER — PATIENT MESSAGE (OUTPATIENT)
Dept: FAMILY MEDICINE | Facility: CLINIC | Age: 64
End: 2024-04-30
Payer: OTHER GOVERNMENT

## 2024-04-30 DIAGNOSIS — E34.9 TESTOSTERONE DEFICIENCY: ICD-10-CM

## 2024-04-30 NOTE — TELEPHONE ENCOUNTER
No care due was identified.  [facility] Embedded Care Due Messages. Reference number: [id number].   4/30/2024 8:38:54 AM CDT

## 2024-05-01 RX ORDER — TESTOSTERONE 10 MG/.5G
10 GEL, METERED TOPICAL DAILY
Qty: 60 G | Refills: 1 | Status: SHIPPED | OUTPATIENT
Start: 2024-05-01 | End: 2024-07-30

## 2024-10-25 ENCOUNTER — PATIENT MESSAGE (OUTPATIENT)
Dept: FAMILY MEDICINE | Facility: CLINIC | Age: 64
End: 2024-10-25
Payer: OTHER GOVERNMENT

## 2024-10-25 ENCOUNTER — OFFICE VISIT (OUTPATIENT)
Dept: FAMILY MEDICINE | Facility: CLINIC | Age: 64
End: 2024-10-25
Payer: OTHER GOVERNMENT

## 2024-10-25 VITALS
BODY MASS INDEX: 34.76 KG/M2 | DIASTOLIC BLOOD PRESSURE: 72 MMHG | OXYGEN SATURATION: 96 % | SYSTOLIC BLOOD PRESSURE: 110 MMHG | TEMPERATURE: 98 F | HEIGHT: 72 IN | HEART RATE: 92 BPM | WEIGHT: 256.63 LBS

## 2024-10-25 DIAGNOSIS — Z13.220 LIPID SCREENING: ICD-10-CM

## 2024-10-25 DIAGNOSIS — Z12.5 PROSTATE CANCER SCREENING: ICD-10-CM

## 2024-10-25 DIAGNOSIS — R79.89 LOW TESTOSTERONE: ICD-10-CM

## 2024-10-25 DIAGNOSIS — R19.7 DIARRHEA, UNSPECIFIED TYPE: ICD-10-CM

## 2024-10-25 DIAGNOSIS — K29.70 GASTRITIS WITHOUT BLEEDING, UNSPECIFIED CHRONICITY, UNSPECIFIED GASTRITIS TYPE: Primary | ICD-10-CM

## 2024-10-25 PROCEDURE — 99999 PR PBB SHADOW E&M-EST. PATIENT-LVL III: CPT | Mod: PBBFAC,,, | Performed by: NURSE PRACTITIONER

## 2024-10-25 PROCEDURE — 99213 OFFICE O/P EST LOW 20 MIN: CPT | Mod: PBBFAC,PO | Performed by: NURSE PRACTITIONER

## 2024-10-25 RX ORDER — SUCRALFATE 1 G/1
1 TABLET ORAL
Qty: 40 TABLET | Refills: 0 | Status: SHIPPED | OUTPATIENT
Start: 2024-10-25

## 2024-10-25 RX ORDER — PANTOPRAZOLE SODIUM 40 MG/1
40 TABLET, DELAYED RELEASE ORAL DAILY
Qty: 30 TABLET | Refills: 2 | Status: SHIPPED | OUTPATIENT
Start: 2024-10-25 | End: 2025-10-25

## 2024-10-25 NOTE — PROGRESS NOTES
Subjective:       Patient ID: Samir Muñoz is a 64 y.o. male.    Chief Complaint: Gastroesophageal Reflux    History of Present Illness    CHIEF COMPLAINT:  Patient presents with worsening stomach issues, including constant indigestion, pain, and heartburn, which have been ongoing for about two months.    HPI:  Patient reports a history of stomach issues that have worsened over the past 2 months. He has constant indigestion and epigastric pain, present today. Heartburn occurs post-prandially, regardless of food type consumed, and is particularly severe at night when supine, often disrupting sleep due to pain intensity.    Patient has diarrhea approximately once a week for the past 2 months, possibly more frequently, with an alternating pattern between diarrhea and constipation. Diarrhea has predominated over the past couple of months and is described as watery. Daily bowel movements occur without straining.    Abdominal pain typically occurs 1-2 hours post-prandially. Today, the patient had pain approximately 30 minutes before the appointment, following lunch. He also reports excessive flatulence and eructation.    To manage symptoms, the patient takes OTC chewable antacids and anti-gas medications. He last took these medications at 2:00 AM last night. Occasional nausea occurs with gastric discomfort, but the patient denies emesis.    Patient prefers bland food and avoids spicy foods. He consumes wine occasionally and recently had beer at a [place] festival. Patient has gained 10 lbs.    A previous colonoscopy found and removed polyps, although a more recent colonoscopy in 2021 was reported as normal.    Patient denies significant weight loss and loss of appetite. He also denies a history of H. pylori or gastric ulcers.       Would like to try another rx for testosterone. Dorothyany could not get the last rx that was prescribed in 7/2024    Past Medical History:   Diagnosis Date    Cataract     OD    GERD  (gastroesophageal reflux disease)     Hyperlipidemia     Hyperlipidemia     Shingles     involved eye    Sleep apnea     resolved with UPPP       Past Surgical History:   Procedure Laterality Date    ADENOIDECTOMY      CATARACT EXTRACTION W/  INTRAOCULAR LENS IMPLANT Left 10/25/2017    Dr Reza    COLONOSCOPY N/A 2/11/2021    Procedure: COLONOSCOPY;  Surgeon: Josué Ray MD;  Location: [facility] ENDO;  Service: Endoscopy;  Laterality: N/A;    KNEE ARTHROSCOPY W/ MENISCECTOMY Left 8/25/2022    Procedure: ARTHROSCOPY, KNEE, WITH MENISCECTOMY;  Surgeon: Azar Michelle MD;  Location: [facility] OR;  Service: Orthopedics;  Laterality: Left;    nerve block      for shingles    TONSILLECTOMY      UVULOPALATOPHARYNGOPLASTY         Review of patient's allergies indicates:   Allergen Reactions    House dust        Social History     Socioeconomic History    Marital status:     Number of children: 4   Tobacco Use    Smoking status: Never    Smokeless tobacco: Never   Substance and Sexual Activity    Alcohol use: Yes     Alcohol/week: 3.0 standard drinks of alcohol     Types: 3 Cans of beer per week     Comment: weekend    Drug use: No    Sexual activity: Yes     Partners: Female     Social Drivers of Health     Financial Resource Strain: Patient Declined (3/11/2024)    Overall Financial Resource Strain (CARDIA)     Difficulty of Paying Living Expenses: Patient declined   Food Insecurity: Patient Declined (3/11/2024)    Hunger Vital Sign     Worried About Running Out of Food in the Last Year: Patient declined     Ran Out of Food in the Last Year: Patient declined   Transportation Needs: Patient Declined (3/11/2024)    PRAPARE - Transportation     Lack of Transportation (Medical): Patient declined     Lack of Transportation (Non-Medical): Patient declined   Physical Activity: Sufficiently Active (3/11/2024)    Exercise Vital Sign     Days of Exercise per Week: 5 days     Minutes of Exercise per Session: 40 min    Stress: No Stress Concern Present (3/11/2024)    [employer] - Occupational Stress Questionnaire     Feeling of Stress : Not at all   Housing Stability: Patient Declined (3/11/2024)    Housing Stability Vital Sign     Unable to Pay for Housing in the Last Year: Patient declined     Unstable Housing in the Last Year: Patient declined       Current Outpatient Medications on File Prior to Visit   Medication Sig Dispense Refill    fluticasone (FLONASE SENSIMIST) 27.5 mcg/actuation nasal spray 2 sprays by Nasal route once daily. 6.6 mL 2    multivitamin (THERAGRAN) per tablet Take 1 tablet by mouth once daily.      testosterone 10 mg/0.5 gram /actuation GlPm Place 10 mg onto the skin once daily. 60 g 1    valACYclovir (VALTREX) 1000 MG tablet Take 1 tablet (1,000 mg total) by mouth 3 (three) times daily. for 7 days 21 tablet 0    [DISCONTINUED] montelukast (SINGULAIR) 10 mg tablet Take 10 mg by mouth once daily. (Patient not taking: Reported on 10/25/2024)       No current facility-administered medications on file prior to visit.       Family History   Problem Relation Name Age of Onset    Hypertension Father      Autoimmune disease Brother Dane (twin)     No Known Problems Daughter Meseret     No Known Problems Daughter Richard     No Known Problems Daughter makayla     No Known Problems Daughter Meeta     Cancer Maternal Grandmother      Heart attack Paternal Grandfather      Amblyopia Neg Hx      Blindness Neg Hx      Cataracts Neg Hx      Glaucoma Neg Hx      Diabetes Neg Hx      Macular degeneration Neg Hx      Retinal detachment Neg Hx      Strabismus Neg Hx      Stroke Neg Hx      Thyroid disease Neg Hx         Review of Systems    Objective:      /72   Pulse 92   Temp 97.9 °F (36.6 °C) (Oral)   Ht 6' (1.829 m)   Wt 116.4 kg (256 lb 9.9 oz)   SpO2 96%   BMI 34.80 kg/m²   Physical Exam    Assessment:       1. Gastritis without bleeding, unspecified chronicity, unspecified  gastritis type    2. Low testosterone    3. Lipid screening    4. Prostate cancer screening    5. Diarrhea, unspecified type        Plan:       Gastritis without bleeding, unspecified chronicity, unspecified gastritis type  -     pantoprazole (PROTONIX) 40 MG tablet; Take 1 tablet (40 mg total) by mouth once daily.  Dispense: 30 tablet; Refill: 2  -     sucralfate (CARAFATE) 1 gram tablet; Take 1 tablet (1 g total) by mouth 4 (four) times daily before meals and nightly.  Dispense: 40 tablet; Refill: 0  -     H. pylori antigen, stool; Future; Expected date: 10/25/2024  -     CBC W/ AUTO DIFFERENTIAL; Future; Expected date: 10/25/2024  -     Comprehensive Metabolic Panel; Future; Expected date: 10/25/2024    Low testosterone  -     TESTOSTERONE; Future; Expected date: 10/25/2024    Lipid screening  -     LIPID PANEL; Future; Expected date: 10/25/2024    Prostate cancer screening  -     PSA, SCREENING; Future; Expected date: 10/25/2024    Diarrhea, unspecified type  -     H. pylori antigen, stool; Future; Expected date: 10/25/2024      Assessment & Plan    Suspected H. pylori based on patient's symptoms of chronic abdominal pain, heartburn, and alternating diarrhea/constipation  Ordered H. pylori stool test to confirm diagnosis before initiating treatment  Considered potential for ulcer given patient's symptoms  Ruled out immediate concern for colon cancer due to weight gain and preserved appetite  Reviewed patient's colonoscopy history; noted normal results in 2021 with recommendation for repeat in 5 years  Assessed testosterone levels; previous reading of 316 from 7 months ago  Considered updating PSA screening due to potential testosterone therapy    Start carafate and protonix if turning in stool sample for H pylori. Labs ordered.     Will start testosterone IM after labs are resulted.    This note was generated with the assistance of ambient listening technology. Verbal consent was obtained by the patient and  accompanying visitor(s) for the recording of patient appointment to facilitate this note. I attest to having reviewed and edited the generated note for accuracy, though some syntax or spelling errors may persist. Please contact the author of this note for any clarification.

## 2024-10-28 ENCOUNTER — LAB VISIT (OUTPATIENT)
Dept: LAB | Facility: HOSPITAL | Age: 64
End: 2024-10-28
Attending: NURSE PRACTITIONER
Payer: OTHER GOVERNMENT

## 2024-10-28 DIAGNOSIS — Z13.220 LIPID SCREENING: ICD-10-CM

## 2024-10-28 DIAGNOSIS — Z12.5 PROSTATE CANCER SCREENING: ICD-10-CM

## 2024-10-28 DIAGNOSIS — K29.70 GASTRITIS WITHOUT BLEEDING, UNSPECIFIED CHRONICITY, UNSPECIFIED GASTRITIS TYPE: ICD-10-CM

## 2024-10-28 DIAGNOSIS — R79.89 LOW TESTOSTERONE: ICD-10-CM

## 2024-10-28 LAB
ALBUMIN SERPL BCP-MCNC: 4.1 G/DL (ref 3.5–5.2)
ALP SERPL-CCNC: 74 U/L (ref 40–150)
ALT SERPL W/O P-5'-P-CCNC: 35 U/L (ref 10–44)
ANION GAP SERPL CALC-SCNC: 9 MMOL/L (ref 8–16)
AST SERPL-CCNC: 26 U/L (ref 10–40)
BASOPHILS # BLD AUTO: 0.03 K/UL (ref 0–0.2)
BASOPHILS NFR BLD: 0.6 % (ref 0–1.9)
BILIRUB SERPL-MCNC: 0.7 MG/DL (ref 0.1–1)
BUN SERPL-MCNC: 16 MG/DL (ref 8–23)
CALCIUM SERPL-MCNC: 9.6 MG/DL (ref 8.7–10.5)
CHLORIDE SERPL-SCNC: 103 MMOL/L (ref 95–110)
CHOLEST SERPL-MCNC: 250 MG/DL (ref 120–199)
CHOLEST/HDLC SERPL: 4.8 {RATIO} (ref 2–5)
CO2 SERPL-SCNC: 27 MMOL/L (ref 23–29)
COMPLEXED PSA SERPL-MCNC: 0.36 NG/ML (ref 0–4)
CREAT SERPL-MCNC: 1.1 MG/DL (ref 0.5–1.4)
DIFFERENTIAL METHOD BLD: NORMAL
EOSINOPHIL # BLD AUTO: 0.1 K/UL (ref 0–0.5)
EOSINOPHIL NFR BLD: 2.7 % (ref 0–8)
ERYTHROCYTE [DISTWIDTH] IN BLOOD BY AUTOMATED COUNT: 13.2 % (ref 11.5–14.5)
EST. GFR  (NO RACE VARIABLE): >60 ML/MIN/1.73 M^2
GLUCOSE SERPL-MCNC: 90 MG/DL (ref 70–110)
HCT VFR BLD AUTO: 46.4 % (ref 40–54)
HDLC SERPL-MCNC: 52 MG/DL (ref 40–75)
HDLC SERPL: 20.8 % (ref 20–50)
HGB BLD-MCNC: 15.7 G/DL (ref 14–18)
IMM GRANULOCYTES # BLD AUTO: 0.01 K/UL (ref 0–0.04)
IMM GRANULOCYTES NFR BLD AUTO: 0.2 % (ref 0–0.5)
LDLC SERPL CALC-MCNC: 158.8 MG/DL (ref 63–159)
LYMPHOCYTES # BLD AUTO: 2 K/UL (ref 1–4.8)
LYMPHOCYTES NFR BLD: 40.8 % (ref 18–48)
MCH RBC QN AUTO: 30.1 PG (ref 27–31)
MCHC RBC AUTO-ENTMCNC: 33.8 G/DL (ref 32–36)
MCV RBC AUTO: 89 FL (ref 82–98)
MONOCYTES # BLD AUTO: 0.4 K/UL (ref 0.3–1)
MONOCYTES NFR BLD: 8 % (ref 4–15)
NEUTROPHILS # BLD AUTO: 2.3 K/UL (ref 1.8–7.7)
NEUTROPHILS NFR BLD: 47.7 % (ref 38–73)
NONHDLC SERPL-MCNC: 198 MG/DL
NRBC BLD-RTO: 0 /100 WBC
PLATELET # BLD AUTO: 238 K/UL (ref 150–450)
PMV BLD AUTO: 11 FL (ref 9.2–12.9)
POTASSIUM SERPL-SCNC: 4.3 MMOL/L (ref 3.5–5.1)
PROT SERPL-MCNC: 7.2 G/DL (ref 6–8.4)
RBC # BLD AUTO: 5.21 M/UL (ref 4.6–6.2)
SODIUM SERPL-SCNC: 139 MMOL/L (ref 136–145)
TESTOST SERPL-MCNC: 332 NG/DL (ref 304–1227)
TRIGL SERPL-MCNC: 196 MG/DL (ref 30–150)
WBC # BLD AUTO: 4.9 K/UL (ref 3.9–12.7)

## 2024-10-28 PROCEDURE — 85025 COMPLETE CBC W/AUTO DIFF WBC: CPT | Performed by: NURSE PRACTITIONER

## 2024-10-28 PROCEDURE — 80053 COMPREHEN METABOLIC PANEL: CPT | Performed by: NURSE PRACTITIONER

## 2024-10-28 PROCEDURE — 84403 ASSAY OF TOTAL TESTOSTERONE: CPT | Performed by: NURSE PRACTITIONER

## 2024-10-28 PROCEDURE — 80061 LIPID PANEL: CPT | Performed by: NURSE PRACTITIONER

## 2024-10-28 PROCEDURE — 36415 COLL VENOUS BLD VENIPUNCTURE: CPT | Mod: PN | Performed by: NURSE PRACTITIONER

## 2024-10-28 PROCEDURE — 84153 ASSAY OF PSA TOTAL: CPT | Performed by: NURSE PRACTITIONER

## 2024-10-31 ENCOUNTER — PATIENT MESSAGE (OUTPATIENT)
Dept: LAB | Facility: HOSPITAL | Age: 64
End: 2024-10-31
Payer: OTHER GOVERNMENT

## 2024-11-03 DIAGNOSIS — K29.70 GASTRITIS WITHOUT BLEEDING, UNSPECIFIED CHRONICITY, UNSPECIFIED GASTRITIS TYPE: ICD-10-CM

## 2024-11-04 ENCOUNTER — OFFICE VISIT (OUTPATIENT)
Dept: FAMILY MEDICINE | Facility: CLINIC | Age: 64
End: 2024-11-04
Payer: OTHER GOVERNMENT

## 2024-11-04 VITALS
SYSTOLIC BLOOD PRESSURE: 106 MMHG | HEIGHT: 72 IN | WEIGHT: 255.06 LBS | OXYGEN SATURATION: 95 % | HEART RATE: 87 BPM | DIASTOLIC BLOOD PRESSURE: 86 MMHG | BODY MASS INDEX: 34.55 KG/M2

## 2024-11-04 DIAGNOSIS — R79.89 LOW TESTOSTERONE: ICD-10-CM

## 2024-11-04 DIAGNOSIS — M70.71 BURSITIS OF RIGHT HIP, UNSPECIFIED BURSA: Primary | ICD-10-CM

## 2024-11-04 PROCEDURE — 99214 OFFICE O/P EST MOD 30 MIN: CPT | Mod: PBBFAC,PO | Performed by: NURSE PRACTITIONER

## 2024-11-04 PROCEDURE — 99214 OFFICE O/P EST MOD 30 MIN: CPT | Mod: S$PBB,,, | Performed by: NURSE PRACTITIONER

## 2024-11-04 PROCEDURE — 99999 PR PBB SHADOW E&M-EST. PATIENT-LVL IV: CPT | Mod: PBBFAC,,, | Performed by: NURSE PRACTITIONER

## 2024-11-04 RX ORDER — TESTOSTERONE CYPIONATE 200 MG/ML
200 INJECTION, SOLUTION INTRAMUSCULAR
Qty: 4 ML | Refills: 0 | Status: SHIPPED | OUTPATIENT
Start: 2024-11-04 | End: 2025-05-05

## 2024-11-04 RX ORDER — METHYLPREDNISOLONE 4 MG/1
TABLET ORAL
Qty: 21 EACH | Refills: 0 | Status: SHIPPED | OUTPATIENT
Start: 2024-11-04 | End: 2024-11-25

## 2024-11-04 RX ORDER — CETIRIZINE HYDROCHLORIDE 10 MG/1
10 TABLET ORAL DAILY
COMMUNITY

## 2024-11-04 NOTE — PROGRESS NOTES
Subjective:       Patient ID: Samir Muñoz is a 64 y.o. male.    Chief Complaint: Hip Pain    History of Present Illness    CHIEF COMPLAINT:  Patient presents with right hip pain and leg tingling, causing difficulty walking and sleeping.    HPI:  Patient reports right hip pain that started 3 days ago, localized to the hip area and sometimes radiating to the back. The pain causes significant difficulty walking and is exacerbated by movement, pressure on the hip, standing, and walking. Patient also has pain when lying down, which caused difficulty sleeping the previous night.    Patient reports tingling and numbness in the entire right leg, from the hip down to the foot, describing this sensation as similar to paresthesia. The pain is concentrated in the hip area, with no pain in the leg itself.    No specific incident caused the pain, but the patient mentions burying his oldest dog a few days before symptom onset, which involved digging with a shovel and using the right foot to apply pressure. This activity is suspected to have contributed to the current symptoms.    To alleviate symptoms, the patient has used crutches to reduce pressure on the affected leg and tried ibuprofen for pain relief, which provided minimal relief. Patient also used a jacuzzi tub and a pool, which provided some relief.    Patient has a history of hip pain when sleeping on the right side, but the current pain is more severe and persistent.      Would like to consider testosterone therapy for low testosterone. Reports fatigue and loss of muscle mass.       Past Medical History:   Diagnosis Date    Cataract     OD    GERD (gastroesophageal reflux disease)     Hyperlipidemia     Hyperlipidemia     Shingles     involved eye    Sleep apnea     resolved with UPPP       Past Surgical History:   Procedure Laterality Date    ADENOIDECTOMY      CATARACT EXTRACTION W/  INTRAOCULAR LENS IMPLANT Left 10/25/2017    Dr Reza    COLONOSCOPY N/A  2/11/2021    Procedure: COLONOSCOPY;  Surgeon: Josué Ray MD;  Location: [facility] ENDO;  Service: Endoscopy;  Laterality: N/A;    KNEE ARTHROSCOPY W/ MENISCECTOMY Left 8/25/2022    Procedure: ARTHROSCOPY, KNEE, WITH MENISCECTOMY;  Surgeon: Azar Michelle MD;  Location: [facility] OR;  Service: Orthopedics;  Laterality: Left;    nerve block      for shingles    TONSILLECTOMY      UVULOPALATOPHARYNGOPLASTY         Review of patient's allergies indicates:   Allergen Reactions    House dust        Social History     Socioeconomic History    Marital status:     Number of children: 4   Tobacco Use    Smoking status: Never    Smokeless tobacco: Never   Substance and Sexual Activity    Alcohol use: Yes     Alcohol/week: 3.0 standard drinks of alcohol     Types: 3 Cans of beer per week     Comment: weekend    Drug use: No    Sexual activity: Yes     Partners: Female     Social Drivers of Health     Financial Resource Strain: Patient Declined (3/11/2024)    Overall Financial Resource Strain (CARDIA)     Difficulty of Paying Living Expenses: Patient declined   Food Insecurity: Patient Declined (3/11/2024)    Hunger Vital Sign     Worried About Running Out of Food in the Last Year: Patient declined     Ran Out of Food in the Last Year: Patient declined   Transportation Needs: Patient Declined (3/11/2024)    PRAPARE - Transportation     Lack of Transportation (Medical): Patient declined     Lack of Transportation (Non-Medical): Patient declined   Physical Activity: Sufficiently Active (3/11/2024)    Exercise Vital Sign     Days of Exercise per Week: 5 days     Minutes of Exercise per Session: 40 min   Stress: No Stress Concern Present (3/11/2024)    [employer] [place] [employer] - Occupational Stress Questionnaire     Feeling of Stress : Not at all   Housing Stability: Patient Declined (3/11/2024)    Housing Stability Vital Sign     Unable to Pay for Housing in the Last Year: Patient declined     Unstable  Housing in the Last Year: Patient declined       Current Outpatient Medications on File Prior to Visit   Medication Sig Dispense Refill    fluticasone (FLONASE SENSIMIST) 27.5 mcg/actuation nasal spray 2 sprays by Nasal route once daily. 6.6 mL 2    multivitamin (THERAGRAN) per tablet Take 1 tablet by mouth once daily.      pantoprazole (PROTONIX) 40 MG tablet Take 1 tablet (40 mg total) by mouth once daily. 30 tablet 2    sucralfate (CARAFATE) 1 gram tablet Take 1 tablet (1 g total) by mouth 4 (four) times daily before meals and nightly. 40 tablet 0    cetirizine (ZYRTEC) 10 MG tablet Take 10 mg by mouth once daily.      valACYclovir (VALTREX) 1000 MG tablet Take 1 tablet (1,000 mg total) by mouth 3 (three) times daily. for 7 days 21 tablet 0    [DISCONTINUED] testosterone 10 mg/0.5 gram /actuation GlPm Place 10 mg onto the skin once daily. 60 g 1     No current facility-administered medications on file prior to visit.       Family History   Problem Relation Name Age of Onset    Hypertension Father      Autoimmune disease Brother Dane (twin)     No Known Problems Daughter Meseret     No Known Problems Daughter Richard     No Known Problems Daughter makayla     No Known Problems Daughter Meeta     Cancer Maternal Grandmother      Heart attack Paternal Grandfather      Amblyopia Neg Hx      Blindness Neg Hx      Cataracts Neg Hx      Glaucoma Neg Hx      Diabetes Neg Hx      Macular degeneration Neg Hx      Retinal detachment Neg Hx      Strabismus Neg Hx      Stroke Neg Hx      Thyroid disease Neg Hx         Review of Systems   Constitutional:  Positive for fatigue. Negative for activity change and unexpected weight change.   HENT:  Negative for hearing loss, rhinorrhea and trouble swallowing.    Eyes:  Negative for discharge and visual disturbance.   Respiratory:  Negative for chest tightness and wheezing.    Cardiovascular:  Negative for chest pain and palpitations.   Gastrointestinal:  Positive for constipation.  Negative for blood in stool, diarrhea and vomiting.   Endocrine: Negative for polydipsia and polyuria.   Genitourinary:  Negative for difficulty urinating, hematuria and urgency.   Musculoskeletal:  Positive for arthralgias. Negative for joint swelling and neck pain.   Neurological:  Positive for weakness and headaches.   Psychiatric/Behavioral:  Negative for dysphoric mood.        Objective:      /86   Pulse 87   Ht 6' (1.829 m)   Wt 115.7 kg (255 lb 1.2 oz)   SpO2 95%   BMI 34.59 kg/m²   Physical Exam  Vitals and nursing note reviewed.   Constitutional:       General: He is not in acute distress.     Appearance: Normal appearance. He is well-groomed.   HENT:      Head: Normocephalic.      Right Ear: External ear normal.      Left Ear: External ear normal.      Nose: Nose normal.      Mouth/Throat:      Lips: Pink.      Mouth: Mucous membranes are moist.      Pharynx: Oropharynx is clear.   Eyes:      Extraocular Movements: Extraocular movements intact.      Conjunctiva/sclera: Conjunctivae normal.      Pupils: Pupils are equal, round, and reactive to light.   Cardiovascular:      Rate and Rhythm: Normal rate and regular rhythm.      Heart sounds: Normal heart sounds. No murmur heard.  Pulmonary:      Effort: Pulmonary effort is normal.      Breath sounds: Normal breath sounds.   Chest:      Chest wall: No tenderness.   Abdominal:      General: Bowel sounds are normal.      Palpations: Abdomen is soft.      Tenderness: There is no abdominal tenderness.   Musculoskeletal:         General: No swelling or tenderness. Normal range of motion.      Cervical back: Normal range of motion and neck supple.      Right lower leg: No edema.      Left lower leg: No edema.      Comments: Pain with abduction of right hip. Negative straight leg raise   Skin:     General: Skin is warm and dry.   Neurological:      General: No focal deficit present.      Mental Status: He is alert and oriented to person, place, and time.  Mental status is at baseline.      Gait: Gait is intact.   Psychiatric:         Mood and Affect: Mood normal.         Behavior: Behavior normal.         Assessment:       1. Bursitis of right hip, unspecified bursa    2. Low testosterone        Plan:       Bursitis of right hip, unspecified bursa  -     methylPREDNISolone (MEDROL DOSEPACK) 4 mg tablet; use as directed  Dispense: 21 each; Refill: 0    Low testosterone  -     testosterone cypionate (DEPOTESTOTERONE CYPIONATE) 200 mg/mL injection; Inject 1 mL (200 mg total) into the muscle every 14 (fourteen) days.  Dispense: 4 mL; Refill: 0  -     CBC W/ AUTO DIFFERENTIAL; Future; Expected date: 11/04/2024  -     TESTOSTERONE; Future; Expected date: 11/04/2024      Assessment & Plan    Diagnosed hip bursitis based on patient's symptoms and physical exam  Assessed low testosterone levels, considering treatment based on symptoms and insurance coverage      HIP BURSITIS:    - Prescribed a 6-day steroid Dosepak for bursitis treatment.  - Recommend stretches to alleviate symptoms.  - Patient to use heat or ice for pain relief.  - Recommend performing stretches once pain improves.  RTC if no improvement.      LOW TESTOSTERONE:  - Noted that the patient's lab results show low testosterone levels.  - Discussed potential symptoms of low testosterone and insurance coverage requirements for treatment.  - Explained testosterone replacement therapy options, including injection technique and monitoring process.  - Initiated testosterone injections every 2 weeks, pending insurance approval.  - Scheduled follow-up for testosterone level check 7 days after 3rd injection, preferably before 10 a.m.              This note was generated with the assistance of ambient listening technology. Verbal consent was obtained by the patient and accompanying visitor(s) for the recording of patient appointment to facilitate this note. I attest to having reviewed and edited the generated note for  accuracy, though some syntax or spelling errors may persist. Please contact the author of this note for any clarification.

## 2024-11-05 ENCOUNTER — PATIENT MESSAGE (OUTPATIENT)
Dept: FAMILY MEDICINE | Facility: CLINIC | Age: 64
End: 2024-11-05
Payer: OTHER GOVERNMENT

## 2024-11-05 NOTE — TELEPHONE ENCOUNTER
PA initiated - pending approval.    Samir Muñoz  2024 3:00 PM   Office Visit  MRN: [id number]  Description: Male : 1960 Provider: Marjan Hargrove NP Department: [facility] Dept Phone: [phone]     Medication  testosterone cypionate (DEPOTESTOTERONE CYPIONATE) 200 mg/mL injection [6704]  Outpatient Medication Detail     Disp Refills Start End [place]   testosterone cypionate (DEPOTESTOTERONE CYPIONATE) 200 mg/mL injection 4 mL 0 2024 No   Sig - Route: Inject 1 mL (200 mg total) into the muscle every 14 (fourteen) days. - Intramuscular   Sent to pharmacy as: testosterone cypionate (DEPOTESTOTERONE CYPIONATE) 200 mg/mL injection   Class: Normal   Order: [id number]   Date/Time Signed: 2024 15:15       E-Prescribing Status: Receipt confirmed by pharmacy (2024  3:15 PM CST)   Prior authorization: Waiting for Payer Response

## 2024-11-06 ENCOUNTER — HOSPITAL ENCOUNTER (OUTPATIENT)
Dept: RADIOLOGY | Facility: HOSPITAL | Age: 64
Discharge: HOME | End: 2024-11-06
Attending: NURSE PRACTITIONER
Payer: OTHER GOVERNMENT

## 2024-11-06 ENCOUNTER — PATIENT MESSAGE (OUTPATIENT)
Dept: FAMILY MEDICINE | Facility: CLINIC | Age: 64
End: 2024-11-06
Payer: OTHER GOVERNMENT

## 2024-11-06 ENCOUNTER — TELEPHONE (OUTPATIENT)
Dept: FAMILY MEDICINE | Facility: CLINIC | Age: 64
End: 2024-11-06
Payer: OTHER GOVERNMENT

## 2024-11-06 DIAGNOSIS — M25.551 RIGHT HIP PAIN: Primary | ICD-10-CM

## 2024-11-06 DIAGNOSIS — M25.551 RIGHT HIP PAIN: ICD-10-CM

## 2024-11-06 PROCEDURE — 73502 X-RAY EXAM HIP UNI 2-3 VIEWS: CPT | Mod: 26,RT,, | Performed by: RADIOLOGY

## 2024-11-06 PROCEDURE — 73502 X-RAY EXAM HIP UNI 2-3 VIEWS: CPT | Mod: TC,PN,RT

## 2024-11-06 RX ORDER — SUCRALFATE 1 G/1
1 TABLET ORAL
Qty: 40 TABLET | Refills: 0 | OUTPATIENT
Start: 2024-11-06

## 2024-11-06 NOTE — TELEPHONE ENCOUNTER
I ordered a referral to orthopedics. They usually get patients in pretty quick. They will determine if he needs a steroid joint injection.

## 2024-11-06 NOTE — TELEPHONE ENCOUNTER
Wanda Phillip Staff (supporting Marjan Hargrove, [place])         11/6/24  2:00 PM  ok thanks, so there is no tear or anything it is just my right hip has arthritis,  a steroid shot might help the pain.  I have to sleep sitting straight up in a chair.   Anytime I lie down the pain keeps me awake  Marjan Blackman, [place] Muñoz         11/6/24  1:53 PM  Your xray shows arthritis/degenerative changes of right hip. An orthopedic referral has been ordered to discuss possible injection of right hip.

## 2024-11-07 ENCOUNTER — OFFICE VISIT (OUTPATIENT)
Dept: ORTHOPEDICS | Facility: CLINIC | Age: 64
End: 2024-11-07
Payer: OTHER GOVERNMENT

## 2024-11-07 VITALS
BODY MASS INDEX: 34.55 KG/M2 | DIASTOLIC BLOOD PRESSURE: 86 MMHG | WEIGHT: 255.06 LBS | SYSTOLIC BLOOD PRESSURE: 106 MMHG | HEIGHT: 72 IN | HEART RATE: 87 BPM

## 2024-11-07 DIAGNOSIS — M25.551 RIGHT HIP PAIN: ICD-10-CM

## 2024-11-07 DIAGNOSIS — M53.3 SACROILIAC JOINT PAIN: Primary | ICD-10-CM

## 2024-11-07 PROCEDURE — 99999 PR PBB SHADOW E&M-EST. PATIENT-LVL V: CPT | Mod: PBBFAC,,, | Performed by: NURSE PRACTITIONER

## 2024-11-07 PROCEDURE — 99999PBSHW PR PBB SHADOW TECHNICAL ONLY FILED TO HB: Mod: PBBFAC,,,

## 2024-11-07 PROCEDURE — 99215 OFFICE O/P EST HI 40 MIN: CPT | Mod: PBBFAC,PO | Performed by: NURSE PRACTITIONER

## 2024-11-07 RX ORDER — DEXAMETHASONE SODIUM PHOSPHATE 4 MG/ML
4 INJECTION, SOLUTION INTRA-ARTICULAR; INTRALESIONAL; INTRAMUSCULAR; INTRAVENOUS; SOFT TISSUE
Status: COMPLETED | OUTPATIENT
Start: 2024-11-07 | End: 2024-11-07

## 2024-11-07 RX ORDER — KETOROLAC TROMETHAMINE 30 MG/ML
30 INJECTION, SOLUTION INTRAMUSCULAR; INTRAVENOUS
Status: COMPLETED | OUTPATIENT
Start: 2024-11-07 | End: 2024-11-07

## 2024-11-07 RX ADMIN — KETOROLAC TROMETHAMINE 30 MG: 30 INJECTION, SOLUTION INTRAMUSCULAR; INTRAVENOUS at 09:11

## 2024-11-07 RX ADMIN — DEXAMETHASONE SODIUM PHOSPHATE 4 MG: 4 INJECTION INTRA-ARTICULAR; INTRALESIONAL; INTRAMUSCULAR; INTRAVENOUS; SOFT TISSUE at 09:11

## 2024-11-07 NOTE — PROGRESS NOTES
Chief Complaint   Patient presents with    Right Hip - Pain       HPI:    This is a 64 y.o. who presents today complaining of right hip pain for 5 days after using a shovel digging. Reports Saturday evening he was sore, but then Sunday morning, he was having serious pain. Pain is sore and radiating down his right lower extremity. Since pain started, he is also having numbness and tingling down his right lower leg. He is also having difficulty walking and lying down flat in bed. Today he is walking with crutches. Rates pain 7/10.      Past Medical History:   Diagnosis Date    Cataract     OD    GERD (gastroesophageal reflux disease)     Hyperlipidemia     Hyperlipidemia     Shingles     involved eye    Sleep apnea     resolved with UPPP      Past Surgical History:   Procedure Laterality Date    ADENOIDECTOMY      CATARACT EXTRACTION W/  INTRAOCULAR LENS IMPLANT Left 10/25/2017    Dr Reza    COLONOSCOPY N/A 2/11/2021    Procedure: COLONOSCOPY;  Surgeon: Josué Ray MD;  Location: [facility] ENDO;  Service: Endoscopy;  Laterality: N/A;    KNEE ARTHROSCOPY W/ MENISCECTOMY Left 8/25/2022    Procedure: ARTHROSCOPY, KNEE, WITH MENISCECTOMY;  Surgeon: Azar Michelle MD;  Location: [facility] OR;  Service: Orthopedics;  Laterality: Left;    nerve block      for shingles    TONSILLECTOMY      UVULOPALATOPHARYNGOPLASTY        Current Outpatient Medications on File Prior to Visit   Medication Sig Dispense Refill    cetirizine (ZYRTEC) 10 MG tablet Take 10 mg by mouth once daily.      fluticasone (FLONASE SENSIMIST) 27.5 mcg/actuation nasal spray 2 sprays by Nasal route once daily. 6.6 mL 2    methylPREDNISolone (MEDROL DOSEPACK) 4 mg tablet use as directed 21 each 0    multivitamin (THERAGRAN) per tablet Take 1 tablet by mouth once daily.      pantoprazole (PROTONIX) 40 MG tablet Take 1 tablet (40 mg total) by mouth once daily. 30 tablet 2    sucralfate (CARAFATE) 1 gram tablet Take 1 tablet (1 g total) by mouth 4 (four)  times daily before meals and nightly. 40 tablet 0    testosterone cypionate (DEPOTESTOTERONE CYPIONATE) 200 mg/mL injection Inject 1 mL (200 mg total) into the muscle every 14 (fourteen) days. 4 mL 0    valACYclovir (VALTREX) 1000 MG tablet Take 1 tablet (1,000 mg total) by mouth 3 (three) times daily. for 7 days 21 tablet 0     No current facility-administered medications on file prior to visit.      Review of patient's allergies indicates:   Allergen Reactions    House dust       Family History not pertinent   Social History     Socioeconomic History    Marital status:     Number of children: 4   Tobacco Use    Smoking status: Never    Smokeless tobacco: Never   Substance and Sexual Activity    Alcohol use: Yes     Alcohol/week: 3.0 standard drinks of alcohol     Types: 3 Cans of beer per week     Comment: weekend    Drug use: No    Sexual activity: Yes     Partners: Female     Social Drivers of Health     Financial Resource Strain: Patient Declined (3/11/2024)    Overall Financial Resource Strain (CARDIA)     Difficulty of Paying Living Expenses: Patient declined   Food Insecurity: Patient Declined (3/11/2024)    Hunger Vital Sign     Worried About Running Out of Food in the Last Year: Patient declined     Ran Out of Food in the Last Year: Patient declined   Transportation Needs: Patient Declined (3/11/2024)    PRAPARE - Transportation     Lack of Transportation (Medical): Patient declined     Lack of Transportation (Non-Medical): Patient declined   Physical Activity: Sufficiently Active (3/11/2024)    Exercise Vital Sign     Days of Exercise per Week: 5 days     Minutes of Exercise per Session: 40 min   Stress: No Stress Concern Present (3/11/2024)    [employer] [place] [employer]ational [employer] - Occupational Stress Questionnaire     Feeling of Stress : Not at all   Housing Stability: Patient Declined (3/11/2024)    Housing Stability Vital Sign     Unable to Pay for Housing in the Last Year: Patient declined      Unstable Housing in the Last Year: Patient declined         Review of Systems:   Constitutional:  Denies fever or chills    Eyes:  Denies change in visual acuity    HENT:  Denies nasal congestion or sore throat    Respiratory:  Denies cough or shortness of breath    Cardiovascular:  Denies chest pain or edema    GI:  Denies abdominal pain, nausea, vomiting, bloody stools or diarrhea    :  Denies dysuria    Integument:  Denies rash    Neurologic:  Denies headache, focal weakness or sensory changes    Endocrine:  Denies polyuria or polydipsia    Lymphatic:  Denies swollen glands    Psychiatric:  Denies depression or anxiety       Physical Exam:    Constitutional:  Well developed, well nourished, no acute distress, non-toxic appearance    Integument:  Well hydrated  Neurologic:  Alert & oriented x 3   Psychiatric:  Speech and behavior appropriate      Bilateral Hip Exam Performed    Right Hip Exam     Tenderness   The patient is experiencing no tenderness in the greater trochanter.  +SI joint tenderness to palpation to right upper buttock region.     Range of Motion   The patient has normal hip ROM.    Muscle Strength   Abduction: 4/5     Other   Erythema: absent  Sensation: normal  Pulse: present    Left Hip Exam   Hip exam performed same as contralateral hip and is normal.     X-rays were performed today, personally reviewed by me and findings discussed with the patient.  2 views of the right hip show no fractures or dislocations        Assessment & plan    Sacroiliac joint pain  -     ketorolac injection 30 mg  -     dexAMETHasone injection 4 mg  -     Ambulatory referral/consult to [facility]; Future; Expected date: 11/14/2024    Right hip pain  -     Ambulatory referral/consult to Orthopedics        See MAR for admin.   Discussed referral to back and spine if pain continues.

## 2024-11-16 ENCOUNTER — DOCUMENTATION ONLY (OUTPATIENT)
Dept: FAMILY MEDICINE | Facility: CLINIC | Age: 64
End: 2024-11-16
Payer: OTHER GOVERNMENT

## 2024-12-04 ENCOUNTER — OFFICE VISIT (OUTPATIENT)
Dept: DERMATOLOGY | Facility: CLINIC | Age: 64
End: 2024-12-04
Payer: OTHER GOVERNMENT

## 2024-12-04 DIAGNOSIS — L57.8 ACTINIC SKIN DAMAGE: ICD-10-CM

## 2024-12-04 DIAGNOSIS — D18.01 CHERRY ANGIOMA: ICD-10-CM

## 2024-12-04 DIAGNOSIS — D22.9 MULTIPLE BENIGN NEVI: ICD-10-CM

## 2024-12-04 DIAGNOSIS — L57.0 AK (ACTINIC KERATOSIS): ICD-10-CM

## 2024-12-04 DIAGNOSIS — L71.9 ROSACEA: Primary | ICD-10-CM

## 2024-12-04 DIAGNOSIS — L82.1 SEBORRHEIC KERATOSIS: ICD-10-CM

## 2024-12-04 DIAGNOSIS — L73.8 SEBACEOUS HYPERPLASIA: ICD-10-CM

## 2024-12-04 PROCEDURE — 17000 DESTRUCT PREMALG LESION: CPT | Mod: AQ,S$GLB,, | Performed by: STUDENT IN AN ORGANIZED HEALTH CARE EDUCATION/TRAINING PROGRAM

## 2024-12-04 PROCEDURE — 17003 DESTRUCT PREMALG LES 2-14: CPT | Mod: AQ,S$GLB,, | Performed by: STUDENT IN AN ORGANIZED HEALTH CARE EDUCATION/TRAINING PROGRAM

## 2024-12-04 PROCEDURE — 99214 OFFICE O/P EST MOD 30 MIN: CPT | Mod: 25,AQ,S$GLB, | Performed by: STUDENT IN AN ORGANIZED HEALTH CARE EDUCATION/TRAINING PROGRAM

## 2024-12-04 RX ORDER — METRONIDAZOLE 10 MG/G
GEL TOPICAL DAILY
Qty: 60 G | Refills: 3 | Status: SHIPPED | OUTPATIENT
Start: 2024-12-04 | End: 2025-12-04

## 2024-12-04 NOTE — PATIENT INSTRUCTIONS

## 2024-12-04 NOTE — PROGRESS NOTES
Subjective:      Patient ID:  Samir Muñoz is a 64 y.o. male who presents for   Chief Complaint   Patient presents with    Spot     Back     LOV 1/24/24    Patient here today for skin check UBSE  Complains of spots on back. States they peel off and bleed. Always come back.     Denies Phx of NMSC  Fhx: maternal grandfather had MM              Review of Systems   Constitutional:  Negative for fever, chills and fatigue.   Respiratory:  Negative for cough and shortness of breath.    Gastrointestinal:  Negative for nausea and vomiting.   Skin:  Positive for wears hat. Negative for daily sunscreen use and activity-related sunscreen use.   Hematologic/Lymphatic: Does not bruise/bleed easily.       Objective:   Physical Exam   Constitutional: He appears well-developed and well-nourished. No distress.   Neurological: He is alert and oriented to person, place, and time. He is not disoriented.   Psychiatric: He has a normal mood and affect.   Skin:   Areas Examined (abnormalities noted in diagram):   Scalp / Hair Palpated and Inspected  Head / Face Inspection Performed  Neck Inspection Performed  Chest / Axilla Inspection Performed  Abdomen Inspection Performed  Back Inspection Performed  RUE Inspected  LUE Inspection Performed  Nails and Digits Inspection Performed                     Diagram Legend     Erythematous scaling macule/papule c/w actinic keratosis       Vascular papule c/w angioma      Pigmented verrucoid papule/plaque c/w seborrheic keratosis      Yellow umbilicated papule c/w sebaceous hyperplasia      Irregularly shaped tan macule c/w lentigo     1-2 mm smooth white papules consistent with Milia      Movable subcutaneous cyst with punctum c/w epidermal inclusion cyst      Subcutaneous movable cyst c/w pilar cyst      Firm pink to brown papule c/w dermatofibroma      Pedunculated fleshy papule(s) c/w skin tag(s)      Evenly pigmented macule c/w junctional nevus     Mildly variegated pigmented, slightly  irregular-bordered macule c/w mildly atypical nevus      Flesh colored to evenly pigmented papule c/w intradermal nevus       Pink pearly papule/plaque c/w basal cell carcinoma      Erythematous hyperkeratotic cursted plaque c/w SCC      Surgical scar with no sign of skin cancer recurrence      Open and closed comedones      Inflammatory papules and pustules      Verrucoid papule consistent consistent with wart     Erythematous eczematous patches and plaques     Dystrophic onycholytic nail with subungual debris c/w onychomycosis     Umbilicated papule    Erythematous-base heme-crusted tan verrucoid plaque consistent with inflamed seborrheic keratosis     Erythematous Silvery Scaling Plaque c/w Psoriasis     See annotation      Assessment / Plan:        Rosacea  -     metronidazole 1% (METROGEL) 1 % Gel; Apply topically once daily.  Dispense: 60 g; Refill: 3  Intermittent papules/ pustules on forehead, minimal changes today but have seen them in the past  Discussed triggers  Offered triple cream- wants to go through his insurance    AK (actinic keratosis)  Cryosurgery Procedure Note    Verbal consent from the patient is obtained and the patient is aware of the precancerous quality and need for treatment of these lesions. Liquid nitrogen cryosurgery is applied to the 2 actinic keratoses, as detailed in the physical exam, to produce a freeze injury. The patient is aware that blisters may form and is instructed on wound care with gentle cleansing and use of vaseline ointment to keep moist until healed. The patient is supplied a handout on cryosurgery and is instructed to call if lesions do not completely resolve.    Actinic skin damage  Upper body skin examination performed today including at least 6 points as noted in physical examination. No lesions suspicious for malignancy noted.  Patient instructed in importance in daily broad spectrum sun protection of at least spf 30. Mineral sunscreen ingredients preferred (Zinc  +/- Titanium) and can be found OTC.   Patient encouraged to wear hat for all outdoor exposure.   Also discussed sun avoidance and use of protective clothing.    Multiple benign nevi  Careful dermoscopy evaluation of nevi performed with none identified as needing biopsy today  Monitor for new mole or moles that are becoming bigger, darker, irritated, or developing irregular borders.     Seborrheic keratosis  These are benign inherited growths without a malignant potential. Reassurance given to patient. No treatment is necessary.     Cherry angioma  This is a benign vascular lesion. Reassurance given. No treatment required.     Sebaceous hyperplasia  This is a common condition representing benign enlargement of the sebaceous lobule. It typically occurs in adulthood. Reassurance given to patient.            1 year    No follow-ups on file.

## 2024-12-05 ENCOUNTER — OFFICE VISIT (OUTPATIENT)
Dept: FAMILY MEDICINE | Facility: CLINIC | Age: 64
End: 2024-12-05
Payer: OTHER GOVERNMENT

## 2024-12-05 VITALS
OXYGEN SATURATION: 98 % | SYSTOLIC BLOOD PRESSURE: 118 MMHG | DIASTOLIC BLOOD PRESSURE: 80 MMHG | HEART RATE: 83 BPM | BODY MASS INDEX: 34.94 KG/M2 | WEIGHT: 257.94 LBS | HEIGHT: 72 IN | TEMPERATURE: 98 F

## 2024-12-05 DIAGNOSIS — M54.16 LUMBAR RADICULOPATHY: Primary | ICD-10-CM

## 2024-12-05 PROCEDURE — 99999 PR PBB SHADOW E&M-EST. PATIENT-LVL IV: CPT | Mod: PBBFAC,,, | Performed by: NURSE PRACTITIONER

## 2024-12-05 PROCEDURE — 99214 OFFICE O/P EST MOD 30 MIN: CPT | Mod: PBBFAC,PO | Performed by: NURSE PRACTITIONER

## 2024-12-05 PROCEDURE — 99213 OFFICE O/P EST LOW 20 MIN: CPT | Mod: S$PBB,,, | Performed by: NURSE PRACTITIONER

## 2024-12-05 NOTE — PROGRESS NOTES
"Subjective:       Patient ID: Samir Muñoz is a 64 y.o. male.    Chief Complaint: Back Pain (Lower back area), Leg Pain (Patient states right leg is weak and wants to know if there are any exercises he can do to strengthen it), Numbness, and Tingling    HPI right low back pain radiating to right buttock X 1 month. Reports weakness, numbness and tingling to right leg. Reports right leg "gives out" for no reason. Has had several episodes of near falling when trying to run.  Reports symptoms started after digging a hole with a shovel. Saw orthopedics on 11/7/24 and was given dexamethasone and toradol IM with some relief. Would like some therapy for right leg weakness. Reports pain is improving.    Went to [facility] chiropractic clinic 2 wks ago and reports xray was done which showed slight bulge to L2. Felt better after spine alignment.    Past Medical History:   Diagnosis Date    Cataract     OD    GERD (gastroesophageal reflux disease)     Hyperlipidemia     Hyperlipidemia     Shingles     involved eye    Sleep apnea     resolved with UPPP       Past Surgical History:   Procedure Laterality Date    ADENOIDECTOMY      CATARACT EXTRACTION W/  INTRAOCULAR LENS IMPLANT Left 10/25/2017    Dr Reza    COLONOSCOPY N/A 2/11/2021    Procedure: COLONOSCOPY;  Surgeon: Josué Ray MD;  Location: [facility] ENDO;  Service: Endoscopy;  Laterality: N/A;    KNEE ARTHROSCOPY W/ MENISCECTOMY Left 8/25/2022    Procedure: ARTHROSCOPY, KNEE, WITH MENISCECTOMY;  Surgeon: Azar Michelle MD;  Location: [facility] OR;  Service: Orthopedics;  Laterality: Left;    nerve block      for shingles    TONSILLECTOMY      UVULOPALATOPHARYNGOPLASTY         Review of patient's allergies indicates:   Allergen Reactions    House dust        Social History     Socioeconomic History    Marital status:     Number of children: 4   Tobacco Use    Smoking status: Never    Smokeless tobacco: Never   Substance and Sexual Activity    " Alcohol use: Yes     Alcohol/week: 3.0 standard drinks of alcohol     Types: 3 Cans of beer per week     Comment: weekend    Drug use: No    Sexual activity: Yes     Partners: Female     Social Drivers of Health     Financial Resource Strain: Patient Declined (3/11/2024)    Overall Financial Resource Strain (CARDIA)     Difficulty of Paying Living Expenses: Patient declined   Food Insecurity: Patient Declined (3/11/2024)    Hunger Vital Sign     Worried About Running Out of Food in the Last Year: Patient declined     Ran Out of Food in the Last Year: Patient declined   Transportation Needs: Patient Declined (3/11/2024)    PRAPARE - Transportation     Lack of Transportation (Medical): Patient declined     Lack of Transportation (Non-Medical): Patient declined   Physical Activity: Sufficiently Active (3/11/2024)    Exercise Vital Sign     Days of Exercise per Week: 5 days     Minutes of Exercise per Session: 40 min   Stress: No Stress Concern Present (3/11/2024)    [employer] - Occupational Stress Questionnaire     Feeling of Stress : Not at all   Housing Stability: Patient Declined (3/11/2024)    Housing Stability Vital Sign     Unable to Pay for Housing in the Last Year: Patient declined     Unstable Housing in the Last Year: Patient declined       Current Outpatient Medications on File Prior to Visit   Medication Sig Dispense Refill    cetirizine (ZYRTEC) 10 MG tablet Take 10 mg by mouth once daily.      fluticasone (FLONASE SENSIMIST) 27.5 mcg/actuation nasal spray 2 sprays by Nasal route once daily. 6.6 mL 2    metronidazole 1% (METROGEL) 1 % Gel Apply topically once daily. 60 g 3    multivitamin (THERAGRAN) per tablet Take 1 tablet by mouth once daily.       No current facility-administered medications on file prior to visit.       Family History   Problem Relation Name Age of Onset    Hypertension Father      Autoimmune disease Brother Dane (twin)     No Known Problems Daughter  Meseret     No Known Problems Daughter Richard     No Known Problems Daughter makayla     No Known Problems Daughter Meeta     Cancer Maternal Grandmother      Heart attack Paternal Grandfather      Amblyopia Neg Hx      Blindness Neg Hx      Cataracts Neg Hx      Glaucoma Neg Hx      Diabetes Neg Hx      Macular degeneration Neg Hx      Retinal detachment Neg Hx      Strabismus Neg Hx      Stroke Neg Hx      Thyroid disease Neg Hx         Review of Systems   Musculoskeletal:  Positive for back pain.   Neurological:  Positive for weakness.       Objective:      /80 (Patient Position: Sitting)   Pulse 83   Temp 97.7 °F (36.5 °C) (Oral)   Ht 6' (1.829 m)   Wt 117 kg (257 lb 15 oz)   SpO2 98%   BMI 34.98 kg/m²   Physical Exam  Vitals and nursing note reviewed.   Constitutional:       General: He is not in acute distress.     Appearance: Normal appearance. He is well-groomed.   HENT:      Head: Normocephalic.      Right Ear: External ear normal.      Left Ear: External ear normal.      Nose: Nose normal.      Mouth/Throat:      Lips: Pink.      Mouth: Mucous membranes are moist.      Pharynx: Oropharynx is clear.   Eyes:      Extraocular Movements: Extraocular movements intact.      Conjunctiva/sclera: Conjunctivae normal.      Pupils: Pupils are equal, round, and reactive to light.   Cardiovascular:      Rate and Rhythm: Normal rate and regular rhythm.      Heart sounds: Normal heart sounds. No murmur heard.  Pulmonary:      Effort: Pulmonary effort is normal.      Breath sounds: Normal breath sounds.   Chest:      Chest wall: No tenderness.   Abdominal:      General: Bowel sounds are normal.      Palpations: Abdomen is soft.      Tenderness: There is no abdominal tenderness.   Musculoskeletal:         General: No swelling or tenderness. Normal range of motion.      Cervical back: Normal range of motion and neck supple.      Right lower leg: No edema.      Left lower leg: No edema.      Comments: Negative  straight leg raise   Skin:     General: Skin is warm and dry.   Neurological:      General: No focal deficit present.      Mental Status: He is alert and oriented to person, place, and time. Mental status is at baseline.      Gait: Gait is intact.   Psychiatric:         Mood and Affect: Mood normal.         Behavior: Behavior normal.         Assessment:       1. Lumbar radiculopathy        Plan:       Lumbar radiculopathy  -     Ambulatory referral/consult to Physical/Occupational Therapy; Future; Expected date: 12/12/2024        Refer to PT. Will consider MRI L spine if no improvement or any worsening symptoms. Can continue ibuprofen prn pain

## 2024-12-10 ENCOUNTER — CLINICAL SUPPORT (OUTPATIENT)
Dept: REHABILITATION | Facility: HOSPITAL | Age: 64
End: 2024-12-10
Payer: OTHER GOVERNMENT

## 2024-12-10 DIAGNOSIS — M54.16 LUMBAR RADICULOPATHY: ICD-10-CM

## 2024-12-10 DIAGNOSIS — M54.41 ACUTE RIGHT-SIDED LOW BACK PAIN WITH RIGHT-SIDED SCIATICA: Primary | ICD-10-CM

## 2024-12-10 DIAGNOSIS — M62.81 MUSCLE WEAKNESS: ICD-10-CM

## 2024-12-10 PROCEDURE — 97530 THERAPEUTIC ACTIVITIES: CPT | Mod: PN | Performed by: PHYSICAL THERAPIST

## 2024-12-10 PROCEDURE — 97161 PT EVAL LOW COMPLEX 20 MIN: CPT | Mod: PN | Performed by: PHYSICAL THERAPIST

## 2024-12-11 NOTE — PLAN OF CARE
[facility]  Physical Therapy Initial Evaluation    Name: Samir Muñoz  Clinic Number: [id number]    Therapy Diagnosis:   Encounter Diagnoses   Name Primary?    Lumbar radiculopathy     Acute right-sided low back pain with right-sided sciatica Yes    Muscle weakness      Physician: Marjan Hargrove, NP    Physician Orders: PT Eval and Treat   Medical Diagnosis from Referral: M54.16 (ICD-10-CM) - Lumbar radiculopathy   Evaluation Date: 12/10/2024  Authorization Period Expiration: 12-31-24  Plan of Care Expiration: 2-4-25  Progress Note Due: 1-10-25  Visit # / Visits authorized: 1/1    FOTO goal 84  Foto  Date  Score    #1/3 12/10/2024 76   #2/3     #3/3         MD Follow up appointment: none scheduled     Precautions: Standard    Time In: 4:02  Time Out: 445  Total Billable Time: 43 minutes    Subjective   Date of onset: 4 weeks ago  History of current condition - Marvin reports: injured by working in yard doing shoveling pushing with R leg  Saw MD got injection and feeling a lot better, but still problem with R leg feel weak and will give out on him Pt did Turkey trot and walked did ok but last 1/4 mile was going to run and unable due to R leg Pt states when he tried to push off with R LE he will almost fall Pt states R leg is numb to ankle  Pt CC R buttock and no LBP   Pt states 20 years ago injured LB in  and recovered nothing recently      Medical History:   Past Medical History:   Diagnosis Date    Cataract     OD    GERD (gastroesophageal reflux disease)     Hyperlipidemia     Hyperlipidemia     Shingles     involved eye    Sleep apnea     resolved with UPPP       Surgical History:   Samir Muñoz  has a past surgical history that includes Adenoidectomy; Tonsillectomy; Uvulopalatopharyngoplasty; nerve block; Cataract extraction w/  intraocular lens implant (Left, 10/25/2017); Colonoscopy (N/A, 2/11/2021); and Knee arthroscopy w/ meniscectomy (Left,  8/25/2022).    Medications:   Samir has a current medication list which includes the following prescription(s): cetirizine, flonase sensimist, metronidazole 1%, and multivitamin.    Allergies:   Review of patient's allergies indicates:   Allergen Reactions    House dust         Imaging, X-ray: Clips overlie the scrotum. Presumed vascular calcifications are seen in the right groin. Minimal acetabular and femoral neck osteophyte formation are seen in both hips. I do not see evidence of fracture or subluxation about the bony pelvis or the right hip.     Prior Therapy: L knee surgery  Social History:  2 grandchildren 3 months ago 8 year old likes to jump on grandpa and his leg gave out on him   Occupation: finance computer job     Prior Level of Function: no limitations  Current Level of Function: limited with sitting by end of day and leg more numb trouble getting out of pool if lead with R leg     Exercise for Wellness: walk home gym and able to perform home gym     Pain:  Current 2/10, worst 10/10, best 0/10   Location: right buttock   R leg always gets more numb by end of day  Description: Aching and Numb  Aggravating Factors: Sitting  Easing Factors: get up and move around   Sleep is not disturbed. Sleeping position: back for pain lying on R side   Saddle symptoms: neg  Bowel or bladder:  neg    Pts goals: strengthen leg and keep leg from giving out     Objective     Postural examination in standing:  - flattened lumbar lordosis  - forward head  - forward shoulders      Postural examination in sitting:   - sat slightly slouched unsupported   - forward head  - forward shoulders      Functional assessment: no deficits in areas noted below  - walking: no gait deficit Pt with hx of torn Achilles L so unable to fully assess toe and heel walking   - sit to stand:   - sit to supine:        - supine to sit:   - supine to prone:     Lumbar active range of motion in standing is:  - flexion - to toes                    "  - extension -  50%                          - left side bending -  2" above knee         - right side bending -  mid thigh pain           Pelvic positioning: level      Flexibility testing:  - hamstrings:     90/90 test R 30 L 15           - gastrocnemius:   DF ankle R 10 degrees L 10 degrees  - piriformis:                  - quadriceps:                 - hip flexors:  - hip adductors:  - IT Bands:     Muscle Strength  MMT R L   Knee extension 5-/5 5/5   Knee flexion 5-/5 5/5   Hip flexion 4/5 5/5   Hip abduction 5/5 5/5   Hip extension 5-/5 5-/5   Glut max 4-/5 4-/5        Ankle dorsiflexion 5/5 5/5   Ankle eversion 5/5 5/5         Endurance is not tested    Lumbar Special tests:  SLR neg    Palpation: no significant TTP     Joint mobility: mod decreased spring    Sensation: Intact to light touch but c/o numbness in leg       Intake Outcome Measure for FOTO lumbar  Survey    Therapist reviewed FOTO scores for Samir Muñoz on 12/10/2024.   FOTO documents entered into [employer] - see Media section or FOTO account episode details.    Intake Score: 76%       TREATMENT   Treatment Time In: 4;30  Treatment Time Out: 4:45  Total Treatment time separate from Evaluation: 15 minutes    Marvin received the treatments listed below:      therapeutic exercises for extension to decrease discogenic signs and symptoms  for 5 minutes including:  Prone press up x 10 q 2 hour  Ptwith decreased intensity of numbness in leg at end of session     therapeutic activities to improve functional performance for 10  minutes, including:  Instructed pt in extension principles and in principles of unloading and use of lumbar roll with sitting at all times and avoid FB trunk  Instructed pt to ice as needed to address any soreness which may occur.      Home Exercises and Patient Education Provided    Education provided:   - extension principles  - HEP   - avoid increased leg symptoms may feel increase lumbar symptoms     Written Home Exercises " Provided: Yes   Exercises were reviewed and Marvin was able to demonstrate them prior to the end of the session.  Marvin demonstrated good understanding of the education provided.     See EMR under Patient Instructions for exercises provided.    Assessment   Samir is a 64 y.o. male referred to outpatient Physical Therapy with a medical diagnosis of M54.16 (ICD-10-CM) - Lumbar radiculopathy . Pt presents with discogenic signs and symptoms and appears to benefit from extension principles    Pt prognosis is Good.   Rehab potential:  Good    Pt will benefit from skilled outpatient Physical Therapy to address the deficits stated above and in the chart below, provide pt/family education, and to maximize pt's level of independence.     Plan of care discussed with patient: Yes  Pt's spiritual, cultural and educational needs considered and patient is agreeable to the plan of care and goals as stated below:     Anticipated Barriers for therapy: work     Medical Necessity is demonstrated by the following  History  Co-morbidities and personal factors that may impact the plan of care [x] LOW: no personal factors / co-morbidities  [] MODERATE: 1-2 personal factors / co-morbidities  [] HIGH: 3+ personal factors / co-morbidities    Moderate / High Support Documentation:   Co-morbidities affecting plan of care: none    Personal Factors:   no deficits     Examination  Body Structures and Functions, activity limitations and participation restrictions that may impact the plan of care [] LOW: addressing 1-2 elements  [] MODERATE: 3+ elements  [x] HIGH: 4+ elements (please support below)    Moderate / High Support Documentation: ROM, strength, lifting, Ability to perform household activities such as cooking, cleaning and assisting others       Clinical Presentation [] LOW: stable  [x] MODERATE: Evolving  [] HIGH: Unstable     Decision Making/ Complexity Score: low         GOALS:   Short Term Goals:  4 weeks  Decrease radicular symptoms to  0/10  Increase strength 1/2 muscle grade  Be able to perform HEP with minimal cueing required    Long Term Goals: 8 weeks  Restore ability to add flexion to ADL and HEP  Improve muscle strength 1 muscle grade  Restore ability to ambulate with normal pain free gait  Restore ability to sit for ADL and work without increased pain  Restore normal sleep habits without disturbances due to pain  Restore ability to perform ADL's and household activities independently and without increased pain    Plan   Plan of care Certification: 12/10/2024 to 2-4-25.    Outpatient Physical Therapy 1-2 times weekly for 8 weeks to include the following interventions: Therapeutic exercises, manual therapy, neuromuscular re-education, therapeutic activities, modalities such as moist heat, ice, ultrasound and electrical stimulation, lumbar mechanical traction and dry needling will be considered and utilized as needed    Rae Gibson, PT

## (undated) DEVICE — SUT ETHILON 3-0 PS2 18 BLK

## (undated) DEVICE — MAT QUICK 40X30 FLOOR FLUID LF

## (undated) DEVICE — PAD CAST SPECIALIST STRL 6

## (undated) DEVICE — Device

## (undated) DEVICE — SYR LUER LOCK 1CC

## (undated) DEVICE — DRAPE EXTREMITY W/ABC NON-SLIP

## (undated) DEVICE — STOCKINET TUBULAR 1 PLY 6X60IN

## (undated) DEVICE — TUBE SET INFLOW/OUTFLOW

## (undated) DEVICE — SEE MEDLINE ITEM 154981

## (undated) DEVICE — PAD ELECTROSURGICAL PAT PLATE

## (undated) DEVICE — DRAPE THREE-QTR REINF 53X77IN

## (undated) DEVICE — SEE L#120831

## (undated) DEVICE — TRAY NERVE BLOCK

## (undated) DEVICE — TUBING SUC UNIV W/CONN 12FT

## (undated) DEVICE — SYR 30CC LUER LOCK

## (undated) DEVICE — SOL IRR NACL .9% 3000ML

## (undated) DEVICE — BLADE SURG CARBON STEEL SZ11

## (undated) DEVICE — BLADE GATOR 4.2

## (undated) DEVICE — SEE MEDLINE ITEM 152622

## (undated) DEVICE — GLOVE BIOGEL ECLIPSE SZ 7.5

## (undated) DEVICE — CANNULA CVD 100MM X 20G

## (undated) DEVICE — SPONGE DERMACEA 4X4IN 12PLY

## (undated) DEVICE — SOL IRR BSS OPHTH 500ML STRL

## (undated) DEVICE — BANDAGE ELAS SOFTWRAP ST 6X5YD

## (undated) DEVICE — GLOVE BIOGEL ECLIPSE SZ 7

## (undated) DEVICE — UNDERGLOVES BIOGEL PI SIZE 8

## (undated) DEVICE — BANDAGE MATRIX HK LOOP 6IN 5YD

## (undated) DEVICE — SOL IRR STRL WATER 500ML

## (undated) DEVICE — SEE MEDLINE ITEM 157128

## (undated) DEVICE — COVER PROXIMA MAYO STAND

## (undated) DEVICE — CANNULA ANTERIOR CHAMBER 30G

## (undated) DEVICE — NDL 18GA X1 1/2 REG BEVEL

## (undated) DEVICE — SPONGE WEC CEL SPEARS

## (undated) DEVICE — NEPTUNE 4 PORT MANIFOLD

## (undated) DEVICE — GLOVE SURGICAL LATEX SZ 7

## (undated) DEVICE — PACK EYE CUSTOM COVINGTON.

## (undated) DEVICE — PAD PREP CUFFED NS 24X48IN

## (undated) DEVICE — PACK OPHTHALMIC

## (undated) DEVICE — SOL BETADINE 5%

## (undated) DEVICE — APPLICATOR CHLORAPREP CLR 10.5

## (undated) DEVICE — DRAPE HALF SURGICAL 40X58IN

## (undated) DEVICE — GAUZE SPONGE 4X4 12PLY

## (undated) DEVICE — MARKER SKIN STND TIP BLUE BARR

## (undated) DEVICE — NDL SPINAL 18GX3.5 SPINOCAN

## (undated) DEVICE — DRESSING XEROFORM FOIL PK 1X8